# Patient Record
Sex: FEMALE | Race: WHITE | NOT HISPANIC OR LATINO | Employment: OTHER | URBAN - METROPOLITAN AREA
[De-identification: names, ages, dates, MRNs, and addresses within clinical notes are randomized per-mention and may not be internally consistent; named-entity substitution may affect disease eponyms.]

---

## 2017-06-27 ENCOUNTER — MYC MEDICAL ADVICE (OUTPATIENT)
Dept: INTERNAL MEDICINE | Facility: CLINIC | Age: 62
End: 2017-06-27

## 2017-06-27 DIAGNOSIS — E06.3 HYPOTHYROIDISM DUE TO HASHIMOTO'S THYROIDITIS: ICD-10-CM

## 2017-06-27 DIAGNOSIS — E78.5 HYPERLIPIDEMIA LDL GOAL <130: Primary | ICD-10-CM

## 2017-06-27 DIAGNOSIS — Z00.00 ENCOUNTER FOR ROUTINE ADULT HEALTH EXAMINATION WITHOUT ABNORMAL FINDINGS: ICD-10-CM

## 2017-07-03 DIAGNOSIS — E03.9 ACQUIRED HYPOTHYROIDISM: ICD-10-CM

## 2017-07-03 RX ORDER — LEVOTHYROXINE SODIUM 75 UG/1
TABLET ORAL
Qty: 90 TABLET | Refills: 0 | Status: SHIPPED | OUTPATIENT
Start: 2017-07-03 | End: 2017-07-18

## 2017-07-03 NOTE — TELEPHONE ENCOUNTER
Levothyroxine     Last Written Prescription Date: 07/07/16  Last Quantity: 90, # refills: 3  Last Office Visit with G, P or [facility] prescribing provider: 07/14/16   Next 5 appointments (look out 90 days)     Jul 18, 2017  9:20 AM CDT   MyChart Physical Adult with Alisia Jordan MD   [facility] ([facility])    [address]  [facility] [id number] [phone]                   TSH   Date Value Ref Range Status   07/06/2016 1.19 0.40 - 4.00 mU/L Final

## 2017-07-12 DIAGNOSIS — E06.3 HYPOTHYROIDISM DUE TO HASHIMOTO'S THYROIDITIS: ICD-10-CM

## 2017-07-12 DIAGNOSIS — Z00.00 ENCOUNTER FOR ROUTINE ADULT HEALTH EXAMINATION WITHOUT ABNORMAL FINDINGS: ICD-10-CM

## 2017-07-12 DIAGNOSIS — E78.5 HYPERLIPIDEMIA LDL GOAL <130: ICD-10-CM

## 2017-07-12 PROCEDURE — 36415 COLL VENOUS BLD VENIPUNCTURE: CPT | Performed by: FAMILY MEDICINE

## 2017-07-12 PROCEDURE — 80048 BASIC METABOLIC PNL TOTAL CA: CPT | Performed by: FAMILY MEDICINE

## 2017-07-12 PROCEDURE — 80061 LIPID PANEL: CPT | Performed by: FAMILY MEDICINE

## 2017-07-12 PROCEDURE — 84443 ASSAY THYROID STIM HORMONE: CPT | Performed by: FAMILY MEDICINE

## 2017-07-13 LAB
ANION GAP SERPL CALCULATED.3IONS-SCNC: 7 MMOL/L (ref 3–14)
BUN SERPL-MCNC: 23 MG/DL (ref 7–30)
CALCIUM SERPL-MCNC: 9.3 MG/DL (ref 8.5–10.1)
CHLORIDE SERPL-SCNC: 107 MMOL/L (ref 94–109)
CHOLEST SERPL-MCNC: 263 MG/DL
CO2 SERPL-SCNC: 26 MMOL/L (ref 20–32)
CREAT SERPL-MCNC: 0.96 MG/DL (ref 0.52–1.04)
GFR SERPL CREATININE-BSD FRML MDRD: 59 ML/MIN/1.7M2
GLUCOSE SERPL-MCNC: 93 MG/DL (ref 70–99)
HDLC SERPL-MCNC: 65 MG/DL
LDLC SERPL CALC-MCNC: 173 MG/DL
NONHDLC SERPL-MCNC: 198 MG/DL
POTASSIUM SERPL-SCNC: 4.8 MMOL/L (ref 3.4–5.3)
SODIUM SERPL-SCNC: 140 MMOL/L (ref 133–144)
TRIGL SERPL-MCNC: 124 MG/DL
TSH SERPL DL<=0.005 MIU/L-ACNC: 1.67 MU/L (ref 0.4–4)

## 2017-07-18 ENCOUNTER — OFFICE VISIT (OUTPATIENT)
Dept: INTERNAL MEDICINE | Facility: CLINIC | Age: 62
End: 2017-07-18
Payer: COMMERCIAL

## 2017-07-18 VITALS
HEIGHT: 66 IN | SYSTOLIC BLOOD PRESSURE: 98 MMHG | DIASTOLIC BLOOD PRESSURE: 68 MMHG | OXYGEN SATURATION: 99 % | WEIGHT: 135.8 LBS | BODY MASS INDEX: 21.83 KG/M2 | TEMPERATURE: 98 F | HEART RATE: 96 BPM | RESPIRATION RATE: 16 BRPM

## 2017-07-18 DIAGNOSIS — L98.9 SKIN LESION: ICD-10-CM

## 2017-07-18 DIAGNOSIS — E78.5 HYPERLIPIDEMIA LDL GOAL <130: ICD-10-CM

## 2017-07-18 DIAGNOSIS — E03.9 ACQUIRED HYPOTHYROIDISM: ICD-10-CM

## 2017-07-18 DIAGNOSIS — Z00.00 ROUTINE GENERAL MEDICAL EXAMINATION AT A HEALTH CARE FACILITY: Primary | ICD-10-CM

## 2017-07-18 DIAGNOSIS — F32.5 MAJOR DEPRESSIVE DISORDER WITH SINGLE EPISODE, IN FULL REMISSION (H): ICD-10-CM

## 2017-07-18 PROCEDURE — 99396 PREV VISIT EST AGE 40-64: CPT | Performed by: INTERNAL MEDICINE

## 2017-07-18 RX ORDER — LEVOTHYROXINE SODIUM 75 UG/1
75 TABLET ORAL DAILY
Qty: 90 TABLET | Refills: 3 | Status: SHIPPED | OUTPATIENT
Start: 2017-07-18 | End: 2018-08-23

## 2017-07-18 RX ORDER — SERTRALINE HYDROCHLORIDE 100 MG/1
100 TABLET, FILM COATED ORAL DAILY
Qty: 90 TABLET | Refills: 3 | Status: SHIPPED | OUTPATIENT
Start: 2017-07-18 | End: 2018-07-15

## 2017-07-18 NOTE — PATIENT INSTRUCTIONS
PREVENTIVE HEALTH RECOMMENDATIONS:     Vaccines: Get a flu shot each year. Get a tetanus shot every 10 years.     Exercise for at least 150 minutes a week (an average of 30 minutes a day, 5 days of the week). This will help you control your weight and prevent disease.    Limit alcohol to one drink per day.    No smoking.     Wear sunscreen to prevent skin cancer.     See your dentist twice a year for an exam and cleaning.    Try to get Calcium 1200 mg total per day. It is best to not take it all at once. Try to get Vitamin D at least 1000 units per day.    BMI or Body Mass Index is a way of indicating weight and health risk for cardiovascular diseases, high blood pressure, diabetes.   Definitions:    Underweight is less than 18.5 and will be associated with health risk.   Normal BMI is 18.5 to 25   Overweight is 25-29   Obesity is 30 or greater   Morbid Obesity is 40 or greater or 35 or greater with diabetes or high blood pressure  Obesity and Morbid Obesity are associated with higher health risks. Lowering calories, exercising more may lower your BMI and even small decreases can have positive impact on lowering health risks.   Your Body mass index is 21.82 kg/(m^2)..

## 2017-07-18 NOTE — NURSING NOTE
"Chief Complaint   Patient presents with     Physical     Blood work completed       Initial BP 98/68  Pulse 96  Temp 98  F (36.7  C) (Oral)  Resp 16  Ht 5' 6.15\" (1.68 m)  Wt 135 lb 12.8 oz (61.6 kg)  SpO2 99%  BMI 21.82 kg/m2 Estimated body mass index is 21.82 kg/(m^2) as calculated from the following:    Height as of this encounter: 5' 6.15\" (1.68 m).    Weight as of this encounter: 135 lb 12.8 oz (61.6 kg).  Medication Reconciliation: complete      Chirag Vicente CMA      Pt received HCD and will return when complete.      "

## 2017-07-18 NOTE — PROGRESS NOTES
SUBJECTIVE:   CC: Yessi Claire is an 61 year old woman who presents for preventive health visit.     Healthy Habits:  Answers for HPI/ROS submitted by the patient on 7/15/2017   Annual Exam:  Getting at least 3 servings of Calcium per day:: Yes  Bi-annual eye exam:: Yes  Dental care twice a year:: Yes  Sleep apnea or symptoms of sleep apnea:: None  Diet:: Regular (no restrictions)  Frequency of exercise:: 4-5 days/week  Taking medications regularly:: Yes  Medication side effects:: None  Additional concerns today:: No  PHQ-2 Score: 0  Duration of exercise:: 45-60 minutes    Current concerns:   Depression well controlled.   She has some questions about lipids, taking fish oil about 2 years be variable use. She may take 1-2 months then not at all a few months.       Today's PHQ-2 Score:   PHQ-2 ( 1999 Pfizer) 7/15/2017 7/14/2016   Q1: Little interest or pleasure in doing things 0 0   Q2: Feeling down, depressed or hopeless 0 0   PHQ-2 Score 0 0   Q1: Little interest or pleasure in doing things Not at all -   Q2: Feeling down, depressed or hopeless Not at all -   PHQ-2 Score 0 -       Abuse: Current or Past(Physical, Sexual or Emotional)- No  Do you feel safe in your environment - Yes    Social History   Substance Use Topics     Smoking status: Never Smoker     Smokeless tobacco: Never Used     Alcohol use No     The patient does not drink >3 drinks per day nor >7 drinks per week.    Reviewed orders with patient.  Reviewed health maintenance and updated orders accordingly - Yes      Patient over age 50, mutual decision to screen reflected in health maintenance.      Pertinent mammograms are reviewed under the imaging tab.  History of abnormal Pap smear: NO - age 30-65 PAP every 5 years with negative HPV co-testing recommended      Patient Active Problem List   Diagnosis     Hypothyroidism     Advanced directives, counseling/discussion     Major depression     Low back pain     Hashimoto's thyroiditis     Lyme  "meningitis     Hyperlipidemia LDL goal <130     Current Outpatient Prescriptions   Medication Sig Dispense Refill     sertraline (ZOLOFT) 100 MG tablet Take 1 tablet (100 mg) by mouth daily 90 tablet 3     levothyroxine (SYNTHROID/LEVOTHROID) 75 MCG tablet Take 1 tablet (75 mcg) by mouth daily 90 tablet 3     Omega-3 Fatty Acids (OMEGA-3 FISH OIL PO) Take by mouth as needed        calcium carbonate (OS-MARY 500 MG [place]. CA) 500 MG tablet Take 500 mg by mouth as needed  180 tablet 3     Naproxen Sodium (ALEVE PO) Take 220 mg by mouth as needed for moderate pain       Multiple Vitamin (MULTI-VITAMIN PO) Take by mouth as needed        ranitidine (ZANTAC 75) 75 MG tablet Take 75 mg by mouth daily. 1 daily       ASPIRIN 81 MG OR TABS 1 tablet 3 times a week        Review Of Systems  Skin: wants derm check  Eyes: negative  Ears/Nose/Throat: negative  Respiratory: No dyspnea on exertion and No cough  Cardiovascular: negative  Gastrointestinal: negative  Genitourinary: negative  Musculoskeletal: negative  Neurologic: negative  Psychiatric: negative  Hematologic/Lymphatic/Immunologic: negative  Endocrine: negative   Gynecologic ros reveals:no breast pain or new or enlarging lumps on self exam, no vaginal bleeding, no discharge or pelvic pain    Objective:  Patient alert, in no acute distress  BP 98/68  Pulse 96  Temp 98  F (36.7  C) (Oral)  Resp 16  Ht 5' 6.15\" (1.68 m)  Wt 135 lb 12.8 oz (61.6 kg)  SpO2 99%  BMI 21.82 kg/m2    HEENT: extraocular movements are intact, pupils equal and reactive to light and accommodation, TMs clear, oropharynx clear  NECK: Neck supple. No adenopathy. Thyroid symmetric, normal size,, Carotids without bruits.  PULMONARY: clear to auscultation  CARDIAC: regular rate and rhythm and no murmurs, clicks, or gallops  PULSES: 2/2 throughout  BACK: no spinal or CVAT  ABDOMINAL: Soft, nontender.  Normal bowel sounds.  No hepatosplenomegaly or abnormal masses  BREAST: No breast masses or " "tenderness, No axillary masses or tenderness and No galactorrhea  PELVIC: external genitalia normal, , bimanual exam with normal uterus and adnexa,  REFLEXES: 2+ throughout  SKIN: benign nevi    Lab Results   Component Value Date    HDL 65 07/12/2017     07/12/2017    CHOL 263 07/12/2017    TRIG 124 07/12/2017           Lab Results   Component Value Date    TSH 1.67 07/12/2017    TSH 1.19 07/06/2016    TSH 1.62 06/30/2015        ASSESSMENT/PLAN:   1. Routine general medical examination at a health care facility      2. Major depressive disorder with single episode, in full remission (H)  Continue med  - sertraline (ZOLOFT) 100 MG tablet; Take 1 tablet (100 mg) by mouth daily  Dispense: 90 tablet; Refill: 3    3. Hyperlipidemia LDL goal <130  Discussed high LDL, recommend low fat diet, probably does not need fish oil, recheck in 3 months.  - Lipid panel reflex to direct LDL; Future    4. Acquired hypothyroidism  Stable, continue dose  - levothyroxine (SYNTHROID/LEVOTHROID) 75 MCG tablet; Take 1 tablet (75 mcg) by mouth daily  Dispense: 90 tablet; Refill: 3    5. Skin lesion  No concerning lesions noted but refer for skin check  - DERMATOLOGY REFERRAL    COUNSELING:   Reviewed preventive health counseling, as reflected in patient instructions       Osteoporosis Prevention/Bone Health         reports that she has never smoked. She has never used smokeless tobacco.    Estimated body mass index is 20.83 kg/(m^2) as calculated from the following:    Height as of 7/14/16: 5' 6.5\" (1.689 m).    Weight as of 7/14/16: 131 lb (59.4 kg).         Counseling Resources:  ATP IV Guidelines  Pooled Cohorts Equation Calculator  Breast Cancer Risk Calculator  FRAX Risk Assessment  ICSI Preventive Guidelines  Dietary Guidelines for Americans, 2010  USDA's MyPlate  ASA Prophylaxis  Lung CA Screening    Alisia Jordan MD  [facility]  "

## 2017-07-18 NOTE — MR AVS SNAPSHOT
After Visit Summary   7/18/2017    Yessi Claire    MRN: [id number]           Patient Information     Date Of Birth          1955        Visit Information        Provider Department      7/18/2017 9:20 AM Alisia Jordan MD [facility]        Today's Diagnoses     Routine general medical examination at a health care facility    -  1    Major depressive disorder with single episode, in full remission (H)        Hyperlipidemia LDL goal <130        Acquired hypothyroidism        Skin lesion          Care Instructions      PREVENTIVE HEALTH RECOMMENDATIONS:     Vaccines: Get a flu shot each year. Get a tetanus shot every 10 years.     Exercise for at least 150 minutes a week (an average of 30 minutes a day, 5 days of the week). This will help you control your weight and prevent disease.    Limit alcohol to one drink per day.    No smoking.     Wear sunscreen to prevent skin cancer.     See your dentist twice a year for an exam and cleaning.    Try to get Calcium 1200 mg total per day. It is best to not take it all at once. Try to get Vitamin D at least 1000 units per day.    BMI or Body Mass Index is a way of indicating weight and health risk for cardiovascular diseases, high blood pressure, diabetes.   Definitions:    Underweight is less than 18.5 and will be associated with health risk.   Normal BMI is 18.5 to 25   Overweight is 25-29   Obesity is 30 or greater   Morbid Obesity is 40 or greater or 35 or greater with diabetes or high blood pressure  Obesity and Morbid Obesity are associated with higher health risks. Lowering calories, exercising more may lower your BMI and even small decreases can have positive impact on lowering health risks.   Your Body mass index is 21.82 kg/(m^2)..             Follow-ups after your visit        Additional Services     DERMATOLOGY REFERRAL       Your provider has referred you to: FMG: [facility] [phone]    [email or web address]  [facility]: [facility] [place] [phone]   [email or web address]  [facility]: [facility] [place] [phone]   [email or web address]  [facility] [place] [phone]  [email or web address]    Please be aware that coverage of these services is subject to the terms and limitations of your health insurance plan.  Call member services at your health plan with any benefit or coverage questions.      Please bring the following with you to your appointment:    (1) Any X-Rays, CTs or MRIs which have been performed.  Contact the facility where they were done to arrange for  prior to your scheduled appointment.    (2) List of current medications  (3) This referral request   (4) Any documents/labs given to you for this referral                  Future tests that were ordered for you today     Open Future Orders        Priority Expected Expires Ordered    Lipid panel reflex to direct LDL Routine 10/18/2017 11/18/2017 7/18/2017            Who to contact     If you have questions or need follow up information about today's clinic visit or your schedule please contact [facility] directly at [phone].  Normal or non-critical lab and imaging results will be communicated to you by MyChart, letter or phone within 4 business days after the clinic has received the results. If you do not hear from us within 7 days, please contact the clinic through [employer] Solicitors[employer] or phone. If you have a critical or abnormal lab result, we will notify you by phone as soon as possible.  Submit refill requests through [employer] or call your pharmacy and they will forward the refill request to us. Please allow 3 business days for your refill to be completed.          Additional Information About Your Visit        Architectural Daily Information     [employer] gives you secure access  "to your electronic health record. If you see a primary care provider, you can also send messages to your care team and make appointments. If you have questions, please call your primary care clinic.  If you do not have a primary care provider, please call [phone] and they will assist you.        Care EveryWhere ID     This is your Care EveryWhere ID. This could be used by other organizations to access your [facility] medical records  [id number]        Your Vitals Were     Pulse Temperature Respirations Height Pulse Oximetry BMI (Body Mass Index)    96 98  F (36.7  C) (Oral) 16 5' 6.15\" (1.68 m) 99% 21.82 kg/m2       Blood Pressure from Last 3 Encounters:   07/18/17 98/68   07/14/16 110/80   07/09/15 110/68    Weight from Last 3 Encounters:   07/18/17 135 lb 12.8 oz (61.6 kg)   07/14/16 131 lb (59.4 kg)   07/09/15 128 lb 3.2 oz (58.2 kg)              We Performed the Following     DERMATOLOGY REFERRAL          Today's Medication Changes          These changes are accurate as of: 7/18/17 10:16 AM.  If you have any questions, ask your nurse or doctor.               These medicines have changed or have updated prescriptions.        Dose/Directions    levothyroxine 75 MCG tablet   Commonly known as:  SYNTHROID/LEVOTHROID   This may have changed:  See the new instructions.   Used for:  Acquired hypothyroidism   Changed by:  Alisia Jordan MD        Dose:  75 mcg   Take 1 tablet (75 mcg) by mouth daily   Quantity:  90 tablet   Refills:  3         Stop taking these medicines if you haven't already. Please contact your care team if you have questions.     VITAMIN D3 PO   Stopped by:  Alisia Jordan MD                Where to get your medicines      These medications were sent to [facility]  [facility] - [address]  [address] [place] [address]     Phone:  [phone]     levothyroxine 75 MCG tablet    sertraline 100 MG tablet                Primary Care Provider Office Phone # " Fax #    Alisia Jordan [phone]       [facility] [address]  [facility] [address]        Equal Access to Services     SACHIN GUZMAN : Hadii aad ku hadniyahaliya Batista, waaxda luqadaha, qaybta kaalmada rayada, tanya minnain hayaapeyton hartzeny edmondsheridan richardson. So [facility] [phone].    ATENCIÓN: Si habla español, tiene a porter disposición servicios gratuitos de asistencia lingüística. Iramame al [phone].    We comply with applicable federal civil rights laws and [place] laws. We do not discriminate on the basis of race, color, national origin, age, disability sex, sexual orientation or gender identity.            Thank you!     Thank you for choosing [facility]  for your care. Our goal is always to provide you with excellent care. Hearing back from our patients is one way we can continue to improve our services. Please take a few minutes to complete the written survey that you may receive in the mail after your visit with us. Thank you!             Your Updated Medication List - Protect others around you: Learn how to safely use, store and throw away your medicines at www.disposemymeds.org.          This list is accurate as of: 7/18/17 10:16 AM.  Always use your most recent med list.                   Brand Name Dispense Instructions for use Diagnosis    ALEVE PO      Take 220 mg by mouth as needed for moderate pain        aspirin 81 MG tablet      1 tablet 3 times a week        calcium carbonate 1250 MG tablet    OS-MARY 500 mg [place]. Ca    180 tablet    Take 500 mg by mouth as needed        levothyroxine 75 MCG tablet    SYNTHROID/LEVOTHROID    90 tablet    Take 1 tablet (75 mcg) by mouth daily    Acquired hypothyroidism       MULTI-VITAMIN PO      Take by mouth as needed        OMEGA-3 FISH OIL PO      Take by mouth as needed        ranitidine 75 MG tablet   Generic drug:  ranitidine      Take 75 mg by mouth daily. 1 daily        sertraline 100 MG tablet    ZOLOFT     90 tablet    Take 1 tablet (100 mg) by mouth daily    Major depressive disorder with single episode, in full remission (H)

## 2017-07-24 ASSESSMENT — PATIENT HEALTH QUESTIONNAIRE - PHQ9: SUM OF ALL RESPONSES TO PHQ QUESTIONS 1-9: 0

## 2017-10-26 ENCOUNTER — ALLIED HEALTH/NURSE VISIT (OUTPATIENT)
Dept: NURSING | Facility: CLINIC | Age: 62
End: 2017-10-26
Payer: COMMERCIAL

## 2017-10-26 DIAGNOSIS — Z23 NEED FOR PROPHYLACTIC VACCINATION AND INOCULATION AGAINST INFLUENZA: Primary | ICD-10-CM

## 2017-10-26 PROCEDURE — 90686 IIV4 VACC NO PRSV 0.5 ML IM: CPT

## 2017-10-26 PROCEDURE — G0008 ADMIN INFLUENZA VIRUS VAC: HCPCS

## 2017-10-26 NOTE — MR AVS SNAPSHOT
After Visit Summary   10/26/2017    Yessi Claire    MRN: [id number]           Patient Information     Date Of Birth          1955        Visit Information        Provider Department      10/26/2017 10:30 AM ROSA DUVALL/LPN [facility] [place]        Today's Diagnoses     Need for prophylactic vaccination and inoculation against influenza    -  1       Follow-ups after your visit        Your next 10 appointments already scheduled     Nov 07, 2017 10:30 AM CST   Lab visit with [facility]   [facility] [place] ([facility])    [address] [place] [address] [phone]           Please do not eat 10-12 hours before your appointment if you are coming in fasting for labs on lipids, cholesterol, or glucose (sugar). Does not apply to pregnant women.  Water with medications is okay. Do not drink coffee or other fluids.  If you have concerns about taking your medications, please send a message by clicking on Secure Messaging, Message Your Care Team.              Who to contact     If you have questions or need follow up information about today's clinic visit or your schedule please contact [facility] directly at [phone].  Normal or non-critical lab and imaging results will be communicated to you by Kymetahart, letter or phone within 4 business days after the clinic has received the results. If you do not hear from us within 7 days, please contact the clinic through Teradicit or phone. If you have a critical or abnormal lab result, we will notify you by phone as soon as possible.  Submit refill requests through Meno[employer]iX or call your pharmacy and they will forward the refill request to us. Please allow 3 business days for your refill to be completed.          Additional Information About Your Visit        MyChart Information     Meno[employer]iX gives you secure access to your electronic health record. If you see a primary care provider, you can also send messages to your care  team and make appointments. If you have questions, please call your primary care clinic.  If you do not have a primary care provider, please call [phone] and they will assist you.        Care EveryWhere ID     This is your Care EveryWhere ID. This could be used by other organizations to access your [facility] medical records  [id number]         Blood Pressure from Last 3 Encounters:   07/18/17 98/68   07/14/16 110/80   07/09/15 110/68    Weight from Last 3 Encounters:   07/18/17 135 lb 12.8 oz (61.6 kg)   07/14/16 131 lb (59.4 kg)   07/09/15 128 lb 3.2 oz (58.2 kg)              We Performed the Following     ADMIN INFLUENZA (For MEDICARE Patients ONLY) []     FLU VAC, SPLIT VIRUS IM > 3 YO (QUADRIVALENT) [13858]        Primary Care Provider Office Phone # Fax #    Alisia Jordan [phone]       [address] [place] [address]        Equal Access to Services     ADELINA [facility] : Hadii aad ku hadasho Soomaali, waaxda luqadaha, qaybta kaalmada adeegyada, waxay pedro hayanisa patterson . So [facility] [phone].    ATENCIÓN: Si habla español, tiene a porter disposición servicios gratuitos de asistencia lingüística. Ll[facility] [phone].    We comply with applicable federal civil rights laws and [place] laws. We do not discriminate on the basis of race, color, national origin, age, disability, sex, sexual orientation, or gender identity.            Thank you!     Thank you for choosing [facility]  for your care. Our goal is always to provide you with excellent care. Hearing back from our patients is one way we can continue to improve our services. Please take a few minutes to complete the written survey that you may receive in the mail after your visit with us. Thank you!             Your Updated Medication List - Protect others around you: Learn how to safely use, store and throw away your medicines at www.disposemymeds.org.          This list is accurate as of:  10/26/17 11:19 AM.  Always use your most recent med list.                   Brand Name Dispense Instructions for use Diagnosis    ALEVE PO      Take 220 mg by mouth as needed for moderate pain        aspirin 81 MG tablet      1 tablet 3 times a week        calcium carbonate 1250 MG tablet    OS-MARY 500 mg [place]. Ca    180 tablet    Take 500 mg by mouth as needed        levothyroxine 75 MCG tablet    SYNTHROID/LEVOTHROID    90 tablet    Take 1 tablet (75 mcg) by mouth daily    Acquired hypothyroidism       MULTI-VITAMIN PO      Take by mouth as needed        OMEGA-3 FISH OIL PO      Take by mouth as needed        ranitidine 75 MG tablet   Generic drug:  ranitidine      Take 75 mg by mouth daily. 1 daily        sertraline 100 MG tablet    ZOLOFT    90 tablet    Take 1 tablet (100 mg) by mouth daily    Major depressive disorder with single episode, in full remission (H)

## 2017-10-26 NOTE — PROGRESS NOTES

## 2017-11-07 DIAGNOSIS — E78.5 HYPERLIPIDEMIA LDL GOAL <130: ICD-10-CM

## 2017-11-07 LAB
CHOLEST SERPL-MCNC: 241 MG/DL
HDLC SERPL-MCNC: 71 MG/DL
LDLC SERPL CALC-MCNC: 152 MG/DL
NONHDLC SERPL-MCNC: 170 MG/DL
TRIGL SERPL-MCNC: 90 MG/DL

## 2017-11-07 PROCEDURE — 36415 COLL VENOUS BLD VENIPUNCTURE: CPT | Performed by: FAMILY MEDICINE

## 2017-11-07 PROCEDURE — 80061 LIPID PANEL: CPT | Performed by: FAMILY MEDICINE

## 2018-02-16 ENCOUNTER — OFFICE VISIT (OUTPATIENT)
Dept: FAMILY MEDICINE | Facility: CLINIC | Age: 63
End: 2018-02-16
Payer: COMMERCIAL

## 2018-02-16 VITALS
BODY MASS INDEX: 22.5 KG/M2 | HEIGHT: 66 IN | WEIGHT: 140 LBS | TEMPERATURE: 98.1 F | OXYGEN SATURATION: 99 % | DIASTOLIC BLOOD PRESSURE: 66 MMHG | HEART RATE: 113 BPM | SYSTOLIC BLOOD PRESSURE: 132 MMHG

## 2018-02-16 DIAGNOSIS — B34.9 VIRAL SYNDROME: Primary | ICD-10-CM

## 2018-02-16 DIAGNOSIS — R07.0 THROAT PAIN: ICD-10-CM

## 2018-02-16 LAB
DEPRECATED S PYO AG THROAT QL EIA: NORMAL
SPECIMEN SOURCE: NORMAL

## 2018-02-16 PROCEDURE — 87081 CULTURE SCREEN ONLY: CPT | Performed by: PHYSICIAN ASSISTANT

## 2018-02-16 PROCEDURE — 99213 OFFICE O/P EST LOW 20 MIN: CPT | Performed by: PHYSICIAN ASSISTANT

## 2018-02-16 PROCEDURE — 87880 STREP A ASSAY W/OPTIC: CPT | Performed by: PHYSICIAN ASSISTANT

## 2018-02-16 ASSESSMENT — ANXIETY QUESTIONNAIRES
2. NOT BEING ABLE TO STOP OR CONTROL WORRYING: NOT AT ALL
1. FEELING NERVOUS, ANXIOUS, OR ON EDGE: NOT AT ALL
3. WORRYING TOO MUCH ABOUT DIFFERENT THINGS: NOT AT ALL
7. FEELING AFRAID AS IF SOMETHING AWFUL MIGHT HAPPEN: NOT AT ALL
5. BEING SO RESTLESS THAT IT IS HARD TO SIT STILL: NOT AT ALL
GAD7 TOTAL SCORE: 0
IF YOU CHECKED OFF ANY PROBLEMS ON THIS QUESTIONNAIRE, HOW DIFFICULT HAVE THESE PROBLEMS MADE IT FOR YOU TO DO YOUR WORK, TAKE CARE OF THINGS AT HOME, OR GET ALONG WITH OTHER PEOPLE: NOT DIFFICULT AT ALL
6. BECOMING EASILY ANNOYED OR IRRITABLE: NOT AT ALL

## 2018-02-16 ASSESSMENT — PATIENT HEALTH QUESTIONNAIRE - PHQ9: 5. POOR APPETITE OR OVEREATING: NOT AT ALL

## 2018-02-16 NOTE — PROGRESS NOTES
SUBJECTIVE:   Yessi Claire is a 62 year old female who presents to clinic today for the following health issues:      Acute Illness   Acute illness concerns: Possible Strep  Onset: x 2 days    Fever: YES- last night 100.4, this morning 99.9    Chills/Sweats: no     Headache (location?): no     Sinus Pressure:no    Conjunctivitis:  no    Ear Pain: R ear plugged with a little pain    Rhinorrhea: YES    Congestion: YES    Sore Throat: YES     Cough: YES    Wheeze: no     Decreased Appetite: no     Nausea: no     Vomiting: no     Diarrhea:  no     Dysuria/Freq.: no     Fatigue/Achiness: YES- body hurt and felt fatigued.    Sick/Strep Exposure: YES- Exposed to granddaughters who have strep     Therapies Tried and outcome: Aleve    Patient received flu shot    Problem list and histories reviewed & adjusted, as indicated.  Additional history: as documented    Patient Active Problem List   Diagnosis     Hypothyroidism     Advanced directives, counseling/discussion     Major depression     Low back pain     Hashimoto's thyroiditis     Hyperlipidemia LDL goal <130     Past Surgical History:   Procedure Laterality Date     NO HISTORY OF SURGERY         Social History   Substance Use Topics     Smoking status: Never Smoker     Smokeless tobacco: Never Used     Alcohol use No     Family History   Problem Relation Age of Onset     Thyroid Disease Mother      C.A.D. Father 80     Thyroid Disease Sister      Thyroid Disease Sister      DIABETES Sister      Type 1         Current Outpatient Prescriptions   Medication Sig Dispense Refill     UNABLE TO FIND Colestaf one a day       sertraline (ZOLOFT) 100 MG tablet Take 1 tablet (100 mg) by mouth daily 90 tablet 3     levothyroxine (SYNTHROID/LEVOTHROID) 75 MCG tablet Take 1 tablet (75 mcg) by mouth daily 90 tablet 3     Omega-3 Fatty Acids (OMEGA-3 FISH OIL PO) Take by mouth as needed        calcium carbonate (OS-MARY 500 MG [place]. [place]) 500 MG tablet Take 500 mg by mouth as  "needed  180 tablet 3     Naproxen Sodium (ALEVE PO) Take 220 mg by mouth as needed for moderate pain       Multiple Vitamin (MULTI-VITAMIN PO) Take by mouth as needed        ranitidine (ZANTAC 75) 75 MG tablet Take 75 mg by mouth daily. 1 daily       ASPIRIN 81 MG OR TABS 1 tablet 3 times a week       No Known Allergies    Reviewed and updated as needed this visit by clinical staff       Reviewed and updated as needed this visit by Provider         ROS:  Constitutional, HEENT, cardiovascular, pulmonary, gi and gu systems are negative, except as otherwise noted.    OBJECTIVE:     /66 (BP Location: Right arm, Patient Position: Sitting, Cuff Size: Adult Regular)  Pulse 113  Temp 98.1  F (36.7  C) (Oral)  Ht 5' 6.15\" (1.68 m)  Wt 140 lb (63.5 kg)  SpO2 99%  BMI 22.49 kg/m2  Body mass index is 22.49 kg/(m^2).  GENERAL: healthy, alert and no distress  EYES: Eyes grossly normal to inspection, PERRL and conjunctivae and sclerae normal  HENT: ear canals and TM's normal, nose and mouth without ulcers or lesions  NECK: no adenopathy, no asymmetry, masses, or scars and thyroid normal to palpation  RESP: lungs clear to auscultation - no rales, rhonchi or wheezes  CV: regular rate and rhythm, normal S1 S2, no S3 or S4, no murmur, click or rub, no peripheral edema and peripheral pulses strong  SKIN: no suspicious lesions or rashes    Diagnostic Test Results:  Results for orders placed or performed in visit on 02/16/18 (from the past 24 hour(s))   Strep, Rapid Screen   Result Value Ref Range    Specimen Description Throat     Rapid Strep A Screen       NEGATIVE: No Group A streptococcal antigen detected by immunoassay, await culture report.       ASSESSMENT/PLAN:     1. Viral syndrome  Symptoms consistent with viral process. Discussed possibility of mild influenza, as patient received flu shot. She declines flu testing today. Recommend home cares and OTC medications to help with symptoms. Follow-up if symptoms worsen " over time, or if no improvement over the next 7-10 days.    2. Throat pain  RST negative. Culture pending  - Strep, Rapid Screen  - Beta strep group A culture    Malia Sr PA-C  [facility]

## 2018-02-16 NOTE — NURSING NOTE
"Chief Complaint   Patient presents with     Pharyngitis       Initial /66 (BP Location: Right arm, Patient Position: Sitting, Cuff Size: Adult Regular)  Pulse 113  Temp 98.1  F (36.7  C) (Oral)  Ht 5' 6.15\" (1.68 m)  Wt 140 lb (63.5 kg)  SpO2 99%  BMI 22.49 kg/m2 Estimated body mass index is 22.49 kg/(m^2) as calculated from the following:    Height as of this encounter: 5' 6.15\" (1.68 m).    Weight as of this encounter: 140 lb (63.5 kg).  Medication Reconciliation: complete   Luna Lowry MA    "

## 2018-02-16 NOTE — PATIENT INSTRUCTIONS

## 2018-02-17 ASSESSMENT — PATIENT HEALTH QUESTIONNAIRE - PHQ9: SUM OF ALL RESPONSES TO PHQ QUESTIONS 1-9: 0

## 2018-02-17 ASSESSMENT — ANXIETY QUESTIONNAIRES: GAD7 TOTAL SCORE: 0

## 2018-02-19 LAB
BACTERIA SPEC CULT: NORMAL
SPECIMEN SOURCE: NORMAL

## 2018-06-29 ENCOUNTER — MYC MEDICAL ADVICE (OUTPATIENT)
Dept: INTERNAL MEDICINE | Facility: CLINIC | Age: 63
End: 2018-06-29

## 2018-07-09 ENCOUNTER — OFFICE VISIT (OUTPATIENT)
Dept: INTERNAL MEDICINE | Facility: CLINIC | Age: 63
End: 2018-07-09
Payer: COMMERCIAL

## 2018-07-09 VITALS
SYSTOLIC BLOOD PRESSURE: 122 MMHG | WEIGHT: 138.4 LBS | HEART RATE: 100 BPM | BODY MASS INDEX: 22.24 KG/M2 | DIASTOLIC BLOOD PRESSURE: 70 MMHG | HEIGHT: 66 IN | TEMPERATURE: 98.5 F | OXYGEN SATURATION: 98 %

## 2018-07-09 DIAGNOSIS — F32.5 MAJOR DEPRESSIVE DISORDER WITH SINGLE EPISODE, IN FULL REMISSION (H): ICD-10-CM

## 2018-07-09 DIAGNOSIS — F41.9 ANXIETY: Primary | ICD-10-CM

## 2018-07-09 PROCEDURE — 99214 OFFICE O/P EST MOD 30 MIN: CPT | Performed by: INTERNAL MEDICINE

## 2018-07-09 NOTE — PROGRESS NOTES
"  SUBJECTIVE:   Yessi Claire is a 62 year old female who presents to clinic today for the following health issues:    She is here today about some increased stress and anxiety symptoms.  She started to have sleep problems a few weeks ago, trouble falling asleep and awakening.  She would feel very anxious when she is awake and it was hard to get back to sleep.  During the day she was more on edge.  She also started to have a little loose stools in the morning.  She would still feel very anxious to the morning but would feel little better by afternoon.  She had contacted me last week and did increase her sertraline to 125 mg, not to 150 is suggested.  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  Since she has increased her medication and thought about why this may be happening, she is actually starting to feel little bit better and wonders if that is more to do with understanding the trigger rather than the medication change.  She has not had any depression symptoms associated with this.    Hyperlipidemia Follow-Up      Rate your low fat/cholesterol diet?: good    Taking statin?  No    Other lipid medications/supplements?:  Fish oil/Omega 3, dose no without side effects    Depression and Anxiety Follow-Up    Status since last visit: Depression per patient is fine, Anxiety has increased over the last few weeks    Other associated symptoms: started with sleep disturbances, looser stools, \"nervous feeling\" in stomach    Complicating factors:     Significant life event: No     Current substance abuse: None    PHQ-9 7/14/2016 7/23/2017 2/16/2018   Total Score 0 0 0   Q9: Suicide Ideation Not at all Not at all Not at all     OSCAR-7 SCORE 2/16/2018   Total Score " "0       PHQ-9  English  PHQ-9   Any Language  OSCAR-7  Suicide Assessment Five-step Evaluation and Treatment (SAFE-T)    Hypothyroidism Follow-up      Since last visit, patient describes the following symptoms: loose stools and anxiety      Amount of exercise or physical activity: 6-7 days/week for an average of 45-60 minutes    Problems taking medications regularly: No    Medication side effects: none    Diet: regular (no restrictions)             ROS:  negative    OBJECTIVE:     /70 (BP Location: Right arm, Patient Position: Sitting, Cuff Size: Adult Regular)  Pulse 100  Temp 98.5  F (36.9  C) (Oral)  Ht 5' 6\" (1.676 m)  Wt 138 lb 6.4 oz (62.8 kg)  SpO2 98%  BMI 22.34 kg/m2  Body mass index is 22.34 kg/(m^2).    Not examined     OSCAR-7 SCORE 2/16/2018 7/15/2018   Total Score 0 3       PHQ-9 SCORE 7/23/2017 2/16/2018 7/15/2018   Total Score - - -   Total Score 0 0 1           ASSESSMENT/PLAN:       1. Anxiety  Advised that her anxiety score is not significantly elevated, discussed some strategies for coping with the stress/anxiety related to the grandchildren.  Discussed strategies for help with sleep.  Advised that she could continue the higher dose of Zoloft for the time being since she is improved and could go back down later.  She will consider these options.  At this time I would not suggest referral for any counseling but advised that that would be available.  An updated medication list but did not send a prescription is not clear whether she will stay on the higher dose or not.  She can call for refill when needed    2. Major depressive disorder with single episode, in full remission (H)  Well controlled          Alisia Jordan MD  [facility]    25 minutes spent with the patient, >50% of time spent counseling about anxiety, questionnaire scores, coping skills    "

## 2018-07-15 PROBLEM — F41.9 ANXIETY: Status: ACTIVE | Noted: 2018-07-15

## 2018-07-15 RX ORDER — SERTRALINE HYDROCHLORIDE 100 MG/1
TABLET, FILM COATED ORAL
Qty: 115 TABLET | Refills: 3
Start: 2018-07-15 | End: 2018-08-23

## 2018-07-15 ASSESSMENT — ANXIETY QUESTIONNAIRES
7. FEELING AFRAID AS IF SOMETHING AWFUL MIGHT HAPPEN: NOT AT ALL
1. FEELING NERVOUS, ANXIOUS, OR ON EDGE: MORE THAN HALF THE DAYS
2. NOT BEING ABLE TO STOP OR CONTROL WORRYING: NOT AT ALL
5. BEING SO RESTLESS THAT IT IS HARD TO SIT STILL: NOT AT ALL
6. BECOMING EASILY ANNOYED OR IRRITABLE: SEVERAL DAYS
GAD7 TOTAL SCORE: 3
3. WORRYING TOO MUCH ABOUT DIFFERENT THINGS: NOT AT ALL

## 2018-07-15 ASSESSMENT — PATIENT HEALTH QUESTIONNAIRE - PHQ9: 5. POOR APPETITE OR OVEREATING: NOT AT ALL

## 2018-07-16 ASSESSMENT — PATIENT HEALTH QUESTIONNAIRE - PHQ9: SUM OF ALL RESPONSES TO PHQ QUESTIONS 1-9: 1

## 2018-07-16 ASSESSMENT — ANXIETY QUESTIONNAIRES: GAD7 TOTAL SCORE: 3

## 2018-07-23 ENCOUNTER — MYC MEDICAL ADVICE (OUTPATIENT)
Dept: INTERNAL MEDICINE | Facility: CLINIC | Age: 63
End: 2018-07-23

## 2018-07-23 DIAGNOSIS — E78.5 HYPERLIPIDEMIA LDL GOAL <130: ICD-10-CM

## 2018-07-23 DIAGNOSIS — Z13.1 SCREENING FOR DIABETES MELLITUS: ICD-10-CM

## 2018-07-23 DIAGNOSIS — E06.3 HYPOTHYROIDISM DUE TO HASHIMOTO'S THYROIDITIS: Primary | ICD-10-CM

## 2018-08-16 DIAGNOSIS — E78.5 HYPERLIPIDEMIA LDL GOAL <130: ICD-10-CM

## 2018-08-16 DIAGNOSIS — Z13.1 SCREENING FOR DIABETES MELLITUS: ICD-10-CM

## 2018-08-16 DIAGNOSIS — E06.3 HYPOTHYROIDISM DUE TO HASHIMOTO'S THYROIDITIS: ICD-10-CM

## 2018-08-16 LAB
ANION GAP SERPL CALCULATED.3IONS-SCNC: 6 MMOL/L (ref 3–14)
BUN SERPL-MCNC: 18 MG/DL (ref 7–30)
CALCIUM SERPL-MCNC: 9.1 MG/DL (ref 8.5–10.1)
CHLORIDE SERPL-SCNC: 105 MMOL/L (ref 94–109)
CHOLEST SERPL-MCNC: 261 MG/DL
CO2 SERPL-SCNC: 28 MMOL/L (ref 20–32)
CREAT SERPL-MCNC: 0.92 MG/DL (ref 0.52–1.04)
GFR SERPL CREATININE-BSD FRML MDRD: 61 ML/MIN/1.7M2
GLUCOSE SERPL-MCNC: 99 MG/DL (ref 70–99)
HDLC SERPL-MCNC: 65 MG/DL
LDLC SERPL CALC-MCNC: 171 MG/DL
NONHDLC SERPL-MCNC: 196 MG/DL
POTASSIUM SERPL-SCNC: 3.8 MMOL/L (ref 3.4–5.3)
SODIUM SERPL-SCNC: 139 MMOL/L (ref 133–144)
TRIGL SERPL-MCNC: 124 MG/DL
TSH SERPL DL<=0.005 MIU/L-ACNC: 1.89 MU/L (ref 0.4–4)

## 2018-08-16 PROCEDURE — 36415 COLL VENOUS BLD VENIPUNCTURE: CPT | Performed by: INTERNAL MEDICINE

## 2018-08-16 PROCEDURE — 80048 BASIC METABOLIC PNL TOTAL CA: CPT | Performed by: INTERNAL MEDICINE

## 2018-08-16 PROCEDURE — 80061 LIPID PANEL: CPT | Performed by: INTERNAL MEDICINE

## 2018-08-16 PROCEDURE — 84443 ASSAY THYROID STIM HORMONE: CPT | Performed by: INTERNAL MEDICINE

## 2018-08-21 ASSESSMENT — ACTIVITIES OF DAILY LIVING (ADL)
CURRENT_FUNCTION: NO ASSISTANCE NEEDED
I_NEED_ASSISTANCE_FOR_THE_FOLLOWING_DAILY_ACTIVITIES:: NO ASSISTANCE IS NEEDED

## 2018-08-23 ENCOUNTER — OFFICE VISIT (OUTPATIENT)
Dept: INTERNAL MEDICINE | Facility: CLINIC | Age: 63
End: 2018-08-23
Payer: COMMERCIAL

## 2018-08-23 VITALS
WEIGHT: 134 LBS | SYSTOLIC BLOOD PRESSURE: 120 MMHG | HEART RATE: 84 BPM | HEIGHT: 66 IN | BODY MASS INDEX: 21.53 KG/M2 | OXYGEN SATURATION: 98 % | TEMPERATURE: 98.1 F | RESPIRATION RATE: 14 BRPM | DIASTOLIC BLOOD PRESSURE: 72 MMHG

## 2018-08-23 DIAGNOSIS — E78.5 HYPERLIPIDEMIA LDL GOAL <130: ICD-10-CM

## 2018-08-23 DIAGNOSIS — Z23 NEED FOR VACCINATION: ICD-10-CM

## 2018-08-23 DIAGNOSIS — E03.9 ACQUIRED HYPOTHYROIDISM: ICD-10-CM

## 2018-08-23 DIAGNOSIS — Z12.31 ENCOUNTER FOR SCREENING MAMMOGRAM FOR BREAST CANCER: ICD-10-CM

## 2018-08-23 DIAGNOSIS — Z00.00 WELCOME TO MEDICARE PREVENTIVE VISIT: Primary | ICD-10-CM

## 2018-08-23 DIAGNOSIS — F41.9 ANXIETY: ICD-10-CM

## 2018-08-23 DIAGNOSIS — F32.5 MAJOR DEPRESSIVE DISORDER WITH SINGLE EPISODE, IN FULL REMISSION (H): ICD-10-CM

## 2018-08-23 PROCEDURE — G0402 INITIAL PREVENTIVE EXAM: HCPCS | Mod: 25 | Performed by: INTERNAL MEDICINE

## 2018-08-23 PROCEDURE — 90670 PCV13 VACCINE IM: CPT | Performed by: INTERNAL MEDICINE

## 2018-08-23 PROCEDURE — G0009 ADMIN PNEUMOCOCCAL VACCINE: HCPCS | Performed by: INTERNAL MEDICINE

## 2018-08-23 PROCEDURE — G0405 EKG INTERPRET & REPORT PREVE: HCPCS | Performed by: INTERNAL MEDICINE

## 2018-08-23 RX ORDER — SERTRALINE HYDROCHLORIDE 100 MG/1
TABLET, FILM COATED ORAL
Qty: 115 TABLET | Refills: 3 | Status: SHIPPED | OUTPATIENT
Start: 2018-08-23 | End: 2019-03-04

## 2018-08-23 RX ORDER — LEVOTHYROXINE SODIUM 75 UG/1
75 TABLET ORAL DAILY
Qty: 90 TABLET | Refills: 3 | Status: SHIPPED | OUTPATIENT
Start: 2018-08-23 | End: 2018-09-05

## 2018-08-23 NOTE — PROGRESS NOTES
SUBJECTIVE:   Yessi Claire is a 63 year old female who presents for Preventive Visit.    Are you in the first 12 months of your Medicare Part B coverage?  Yes,  Visual Acuity:  Right Eye: 20/40   Left Eye: 20/40  Both Eyes: 20/30    Healthy Habits:  Answers for HPI/ROS submitted by the patient on 8/21/2018   Annual Exam:  Getting at least 3 servings of Calcium per day:: Yes  Bi-annual eye exam:: Yes  Dental care twice a year:: Yes  Sleep apnea or symptoms of sleep apnea:: None  Diet:: Regular (no restrictions)  Frequency of exercise:: 4-5 days/week  Taking medications regularly:: Yes  Medication side effects:: None  Additional concerns today:: No  Activities of Daily Living: no assistance needed  Home safety: lack of grab bars in the bathroom  Hearing Impairment:: no hearing concerns  PHQ-2 Score: 0  Duration of exercise:: 45-60 minutes    Current concerns:   Questions about lipids, treatment     COGNITIVE SCREEN  1) Repeat 3 items (Leader, Season, Table)    2) Clock draw: NORMAL  3) 3 item recall: Recalls 3 objects  Results: 3 items recalled: COGNITIVE IMPAIRMENT LESS LIKELY    Mini-CogTM Copyright S Favio. Licensed by the author for use in [facility]; reprinted with permission (solizzy@[facility]). All rights reserved.         Reviewed and updated as needed this visit by clinical staff  Tobacco  Allergies  Meds  Med Hx  Surg Hx  Fam Hx  Soc Hx        Reviewed and updated as needed this visit by Provider        Social History   Substance Use Topics     Smoking status: Never Smoker     Smokeless tobacco: Never Used     Alcohol use No       If you drink alcohol do you typically have >3 drinks per day or >7 drinks per week? No                        Today's PHQ-2 Score:   PHQ-2 ( 1999 Pfizer) 8/21/2018 7/18/2017   Q1: Little interest or pleasure in doing things 0 0   Q2: Feeling down, depressed or hopeless 0 0   PHQ-2 Score 0 0   Q1: Little interest or pleasure in doing things Not at all -   Q2:  Feeling down, depressed or hopeless Not at all -   PHQ-2 Score 0 -       Do you feel safe in your environment - Yes    Do you have a Health Care Directive?: No: Advance care planning reviewed with patient; information given to patient to review.    Current providers sharing in care for this patient include:   Patient Care Team:  Alisia Jordan MD as PCP - General (Internal Medicine)    The following health maintenance items are reviewed in Epic and correct as of today:  Health Maintenance   Topic Date Due     ADVANCE DIRECTIVE PLANNING Q5 YRS  07/17/2017     DEPRESSION ACTION PLAN Q1 YR  07/23/2018     PNEUMOCOCCAL (1 of 2 - PCV13) 08/04/2018     FALL RISK ASSESSMENT  08/04/2018     MAMMO Q2 YR  09/16/2018     INFLUENZA VACCINE (1) 09/01/2018     PHQ-9 Q6 MONTHS  01/09/2019     LIPID MONITORING Q1 YEAR  08/16/2019     COLON CANCER SCREEN (SYSTEM ASSIGNED)  05/01/2020     PAP Q5 YEARS  07/09/2020     HPV Q5 YEARS (Complete with PAP)  07/09/2020     TETANUS IMMUNIZATION (SYSTEM ASSIGNED)  01/10/2024     DEXA SCAN SCREENING (SYSTEM ASSIGNED)  Completed     HIV SCREEN (SYSTEM ASSIGNED)  Completed     HEPATITIS C SCREENING  Completed     Patient Active Problem List   Diagnosis     Hypothyroidism     Advanced directives, counseling/discussion     Major depression     Low back pain     Hashimoto's thyroiditis     Hyperlipidemia LDL goal <130     Anxiety     Current Outpatient Prescriptions   Medication Sig Dispense Refill     ASPIRIN 81 MG OR TABS 1 tablet 3 times a week       CALCIUM-VITAMIN D PO Take by mouth daily       levothyroxine (SYNTHROID/LEVOTHROID) 75 MCG tablet Take 1 tablet (75 mcg) by mouth daily 90 tablet 3     Multiple Vitamin (MULTI-VITAMIN PO) Take by mouth as needed        Naproxen Sodium (ALEVE PO) Take 220 mg by mouth as needed for moderate pain       Omega-3 Fatty Acids (OMEGA-3 FISH OIL PO) Take by mouth as needed        ranitidine (ZANTAC 75) 75 MG tablet Take 75 mg by mouth daily. 1 daily        "sertraline (ZOLOFT) 100 MG tablet 125 mg daily 115 tablet 3     Plant Sterols and Stanols (CHOLESTOFF PO) Take by mouth daily       Review Of Systems  Skin: negative  Eyes: negative  Ears/Nose/Throat: negative  Respiratory: No dyspnea on exertion and No cough  Cardiovascular: negative  Gastrointestinal: negative  Genitourinary: negative  Musculoskeletal: negative  Neurologic: negative  Psychiatric: negative  Hematologic/Lymphatic/Immunologic: negative  Endocrine: negative   Gynecologic ros reveals:no breast pain or new or enlarging lumps on self exam, no vaginal bleeding, no discharge or pelvic pain    Objective:  Patient alert, in no acute distress  /72 (BP Location: Right arm, Patient Position: Sitting, Cuff Size: Adult Regular)  Pulse 84  Temp 98.1  F (36.7  C) (Oral)  Resp 14  Ht 5' 6.14\" (1.68 m)  Wt 134 lb (60.8 kg)  SpO2 98%  Breastfeeding? No  BMI 21.54 kg/m2    HEENT: extraocular movements are intact, pupils equal and reactive to light and accommodation, TMs clear, oropharynx clear  NECK: Neck supple. No adenopathy. Thyroid symmetric, normal size,, Carotids without bruits.  PULMONARY: clear to auscultation  CARDIAC: regular rate and rhythm and no murmurs, clicks, or gallops  PULSES: 2/2 throughout  BACK: no spinal or CVAT  ABDOMINAL: Soft, nontender.  Normal bowel sounds.  No hepatosplenomegaly or abnormal masses  BREAST: No breast masses or tenderness, No axillary masses or tenderness and No galactorrhea  PELVIC: external genitalia normal, , bimanual exam with normal uterus and adnexa,   REFLEXES: 2+ throughout  SKIN: unremarkable    PHQ-9 SCORE 7/23/2017 2/16/2018 7/15/2018   Total Score - - -   Total Score 0 0 1        Lab Results   Component Value Date    HDL 65 08/16/2018     08/16/2018    CHOL 261 08/16/2018    TRIG 124 08/16/2018          EKG: normal sinus rhythm, normal    ASSESSMENT / PLAN:   1. Welcome to Medicare preventive visit    - EKG 12-lead complete w/read - " "Clinics    2. Major depressive disorder with single episode, in full remission (H)  stable  - sertraline (ZOLOFT) 100 MG tablet; 125 mg daily  Dispense: 115 tablet; Refill: 3    3. Anxiety  stable  - sertraline (ZOLOFT) 100 MG tablet; 125 mg daily  Dispense: 115 tablet; Refill: 3    4. Acquired hypothyroidism  Recheck lab  - levothyroxine (SYNTHROID/LEVOTHROID) 75 MCG tablet; Take 1 tablet (75 mcg) by mouth daily  Dispense: 90 tablet; Refill: 3    5. Hyperlipidemia LDL goal <130  Discussed previous results, risk AHA calculation, would not recommend medication now.     6. Need for vaccination  Advised about vaccine  - Pneumococcal vaccine 13 valent PCV13 IM (Prevnar) [12388]  - 1st  Administration  [87713]    7. Encounter for screening mammogram for breast cancer  due  - *MA Screening Digital Bilateral; Future    End of Life Planning:  Patient currently has an advanced directive: No.  I have verified the patient's ablity to prepare an advanced directive/make health care decisions.  Literature was provided to assist patient in preparing an advanced directive.    COUNSELING:  Reviewed preventive health counseling, as reflected in patient instructions    BP Readings from Last 1 Encounters:   08/23/18 120/72     Estimated body mass index is 21.54 kg/(m^2) as calculated from the following:    Height as of this encounter: 5' 6.14\" (1.68 m).    Weight as of this encounter: 134 lb (60.8 kg).           reports that she has never smoked. She has never used smokeless tobacco.      Appropriate preventive services were discussed with this patient, including applicable screening as appropriate for cardiovascular disease, diabetes, osteopenia/osteoporosis, and glaucoma.  As appropriate for age/gender, discussed screening for colorectal cancer, prostate cancer, breast cancer, and cervical cancer. Checklist reviewing preventive services available has been given to the patient.    Reviewed patients plan of care and provided an AVS. " The Basic Care Plan (routine screening as documented in Health Maintenance) for Yessi meets the Care Plan requirement. This Care Plan has been established and reviewed with the Patient.    Counseling Resources:  ATP IV Guidelines  Pooled Cohorts Equation Calculator  Breast Cancer Risk Calculator  FRAX Risk Assessment  ICSI Preventive Guidelines  Dietary Guidelines for Americans, 2010  USDA's MyPlate  ASA Prophylaxis  Lung CA Screening    Alisia Jordan MD  [facility]

## 2018-08-23 NOTE — MR AVS SNAPSHOT
After Visit Summary   8/23/2018    Yessi Claire    MRN: [id number]           Patient Information     Date Of Birth          1955        Visit Information        Provider Department      8/23/2018 9:20 AM Alisia Jordan MD [facility]        Today's Diagnoses     Welcome to Medicare preventive visit    -  1    Major depressive disorder with single episode, in full remission (H)        Anxiety        Acquired hypothyroidism        Hyperlipidemia LDL goal <130        Encounter for screening mammogram for breast cancer          Care Instructions      PREVENTIVE HEALTH RECOMMENDATIONS:     Vaccines: Get a flu shot each year. Get a tetanus shot every 10 years.     Exercise for at least 150 minutes a week (an average of 30 minutes a day, 5 days of the week). This will help you control your weight and prevent disease.    Limit alcohol to one drink per day.    No smoking.     Wear sunscreen to prevent skin cancer.     See your dentist twice a year for an exam and cleaning.  Shingrix is the new shingles vaccine.      Try to get Calcium 1200 mg total per day. It is best to not take it all at once. Try to get Vitamin D at least 9665-6320 units per day.    BMI or Body Mass Index is a way of indicating weight and health risk for cardiovascular diseases, high blood pressure, diabetes.   Definitions:    Underweight is less than 18.5 and will be associated with health risk.   Normal BMI is 18.5 to 25   Overweight is 25-29   Obesity is 30 or greater   Morbid Obesity is 40 or greater or 35 or greater with diabetes, prediabetes or abnormal blood sugar, high blood pressure or elevated cholesterol  Obesity and Morbid Obesity are associated with higher health risks. Lowering calories, exercising more may lower your BMI and even small decreases can have positive impact on lowering health risks.   Your Body mass index is 21.54 kg/(m^2)..,              Follow-ups after your visit        Future tests that  "were ordered for you today     Open Future Orders        Priority Expected Expires Ordered    *MA Screening Digital Bilateral Routine  8/23/2019 8/23/2018            Who to contact     If you have questions or need follow up information about today's clinic visit or your schedule please contact [facility] directly at [phone].  Normal or non-critical lab and imaging results will be communicated to you by MyChart, letter or phone within 4 business days after the clinic has received the results. If you do not hear from us within 7 days, please contact the clinic through [employer] or phone. If you have a critical or abnormal lab result, we will notify you by phone as soon as possible.  Submit refill requests through [employer] or call your pharmacy and they will forward the refill request to us. Please allow 3 business days for your refill to be completed.          Additional Information About Your Visit        MyChart Information     [employer] gives you secure access to your electronic health record. If you see a primary care provider, you can also send messages to your care team and make appointments. If you have questions, please call your primary care clinic.  If you do not have a primary care provider, please call [phone] and they will assist you.        Care EveryWhere ID     This is your Care EveryWhere ID. This could be used by other organizations to access your [place] medical records  [id number]        Your Vitals Were     Pulse Temperature Respirations Height Pulse Oximetry Breastfeeding?    84 98.1  F (36.7  C) (Oral) 14 5' 6.14\" (1.68 m) 98% No    BMI (Body Mass Index)                   21.54 kg/m2            Blood Pressure from Last 3 Encounters:   08/23/18 120/72   07/09/18 122/70   02/16/18 132/66    Weight from Last 3 Encounters:   08/23/18 134 lb (60.8 kg)   07/09/18 138 lb 6.4 oz (62.8 kg)   02/16/18 140 lb (63.5 kg)              We Performed the Following     EKG 12-lead " complete w/read - Clinics          Where to get your medicines      Some of these will need a paper prescription and others can be bought over the counter.  Ask your nurse if you have questions.     Bring a paper prescription for each of these medications     levothyroxine 75 MCG tablet    sertraline 100 MG tablet          Primary Care Provider Office Phone # Fax #    Alisia Jordan [phone]       [address] [facility] [address]  [facility] [address]        Equal Access to Services     [facility] : Hadii aad ku hadasho Soomaali, waaxda luqadaha, qaybta kaalmada adeegyada, waxay idiin hayaan adeeg jenn patterson . So [facility] [phone].    ATENCIÓN: Si maula espmarlen, tiene a porter disposición servicios gratuitos de asistencia lingüística. [facility] [phone].    We comply with applicable federal civil rights laws and [place] laws. We do not discriminate on the basis of race, color, national origin, age, disability, sex, sexual orientation, or gender identity.            Thank you!     Thank you for choosing [facility]  for your care. Our goal is always to provide you with excellent care. Hearing back from our patients is one way we can continue to improve our services. Please take a few minutes to complete the written survey that you may receive in the mail after your visit with us. Thank you!             Your Updated Medication List - Protect others around you: Learn how to safely use, store and throw away your medicines at www.disposemymeds.org.          This list is accurate as of 8/23/18  9:59 AM.  Always use your most recent med list.                   Brand Name Dispense Instructions for use Diagnosis    ALEVE PO      Take 220 mg by mouth as needed for moderate pain        aspirin 81 MG tablet      1 tablet 3 times a week        CALCIUM-VITAMIN D PO      Take by mouth daily        CHOLESTOFF PO      Take by mouth daily        levothyroxine 75 MCG tablet    SYNTHROID/LEVOTHROID    90  tablet    Take 1 tablet (75 mcg) by mouth daily    Acquired hypothyroidism       MULTI-VITAMIN PO      Take by mouth as needed        OMEGA-3 FISH OIL PO      Take by mouth as needed        ranitidine 75 MG tablet   Generic drug:  ranitidine      Take 75 mg by mouth daily. 1 daily        sertraline 100 MG tablet    ZOLOFT    115 tablet    125 mg daily    Major depressive disorder with single episode, in full remission (H), Anxiety

## 2018-08-23 NOTE — PATIENT INSTRUCTIONS
PREVENTIVE HEALTH RECOMMENDATIONS:     Vaccines: Get a flu shot each year. Get a tetanus shot every 10 years.     Exercise for at least 150 minutes a week (an average of 30 minutes a day, 5 days of the week). This will help you control your weight and prevent disease.    Limit alcohol to one drink per day.    No smoking.     Wear sunscreen to prevent skin cancer.     See your dentist twice a year for an exam and cleaning.  Shingrix is the new shingles vaccine.      Try to get Calcium 1200 mg total per day. It is best to not take it all at once. Try to get Vitamin D at least 1578-7355 units per day.    BMI or Body Mass Index is a way of indicating weight and health risk for cardiovascular diseases, high blood pressure, diabetes.   Definitions:    Underweight is less than 18.5 and will be associated with health risk.   Normal BMI is 18.5 to 25   Overweight is 25-29   Obesity is 30 or greater   Morbid Obesity is 40 or greater or 35 or greater with diabetes, prediabetes or abnormal blood sugar, high blood pressure or elevated cholesterol  Obesity and Morbid Obesity are associated with higher health risks. Lowering calories, exercising more may lower your BMI and even small decreases can have positive impact on lowering health risks.   Your Body mass index is 21.54 kg/(m^2)..,

## 2018-08-28 ENCOUNTER — MYC MEDICAL ADVICE (OUTPATIENT)
Dept: INTERNAL MEDICINE | Facility: CLINIC | Age: 63
End: 2018-08-28

## 2018-08-28 DIAGNOSIS — E03.9 ACQUIRED HYPOTHYROIDISM: ICD-10-CM

## 2018-09-05 RX ORDER — LEVOTHYROXINE SODIUM 75 UG/1
75 TABLET ORAL DAILY
Qty: 90 TABLET | Refills: 3 | Status: SHIPPED | OUTPATIENT
Start: 2018-09-05 | End: 2019-03-04

## 2018-09-26 ENCOUNTER — HOSPITAL ENCOUNTER (OUTPATIENT)
Dept: MAMMOGRAPHY | Facility: CLINIC | Age: 63
Discharge: HOME | End: 2018-09-26
Attending: INTERNAL MEDICINE | Admitting: INTERNAL MEDICINE
Payer: MEDICARE

## 2018-09-26 DIAGNOSIS — Z12.31 ENCOUNTER FOR SCREENING MAMMOGRAM FOR BREAST CANCER: ICD-10-CM

## 2018-09-26 PROCEDURE — 77067 SCR MAMMO BI INCL CAD: CPT

## 2018-10-26 ENCOUNTER — ALLIED HEALTH/NURSE VISIT (OUTPATIENT)
Dept: NURSING | Facility: CLINIC | Age: 63
End: 2018-10-26
Payer: COMMERCIAL

## 2018-10-26 DIAGNOSIS — Z23 NEED FOR PROPHYLACTIC VACCINATION AND INOCULATION AGAINST INFLUENZA: Primary | ICD-10-CM

## 2018-10-26 PROCEDURE — G0008 ADMIN INFLUENZA VIRUS VAC: HCPCS

## 2018-10-26 PROCEDURE — 90662 IIV NO PRSV INCREASED AG IM: CPT

## 2018-10-26 NOTE — MR AVS SNAPSHOT
After Visit Summary   10/26/2018    Yessi Claire    MRN: [id number]           Patient Information     Date Of Birth          1955        Visit Information        Provider Department      10/26/2018 10:30 AM [facility] CLINIC [facility]        Today's Diagnoses     Need for prophylactic vaccination and inoculation against influenza    -  1       Follow-ups after your visit        Who to contact     If you have questions or need follow up information about today's clinic visit or your schedule please contact [facility] directly at [phone].  Normal or non-critical lab and imaging results will be communicated to you by [employer], letter or phone within 4 business days after the clinic has received the results. If you do not hear from us within 7 days, please contact the clinic through [employer] or phone. If you have a critical or abnormal lab result, we will notify you by phone as soon as possible.  Submit refill requests through [employer] or call your pharmacy and they will forward the refill request to us. Please allow 3 business days for your refill to be completed.          Additional Information About Your Visit        MyChart Information     [employer] gives you secure access to your electronic health record. If you see a primary care provider, you can also send messages to your care team and make appointments. If you have questions, please call your primary care clinic.  If you do not have a primary care provider, please call [phone] and they will assist you.        Care EveryWhere ID     This is your Care EveryWhere ID. This could be used by other organizations to access your [place] medical records  [id number]         Blood Pressure from Last 3 Encounters:   08/23/18 120/72   07/09/18 122/70   02/16/18 132/66    Weight from Last 3 Encounters:   08/23/18 134 lb (60.8 kg)   07/09/18 138 lb 6.4 oz (62.8 kg)   02/16/18 140 lb (63.5 kg)              We Performed the  Following     FLU VACCINE, INCREASED ANTIGEN, PRESV FREE, AGE 65+ [45371]     Vaccine Administration, Initial [[id number]]        Primary Care Provider Office Phone # Fax #    Alisia Jordan [phone]       [address]  [facility] [address]        Equal Access to Services     SACHIN GUZMAN : Hadii aad ku hadasho Soomaali, waaxda luqadaha, qaybta kaalmada adeegyada, waxay minnain haytatiannan adezeny mayitosheridan richardson. So [facility] [phone].    ATENCIÓN: Si habla español, tiene a porter disposición servicios gratuitos de asistencia lingüística. [facility] [phone].    We comply with applicable federal civil rights laws and [place] laws. We do not discriminate on the basis of race, color, national origin, age, disability, sex, sexual orientation, or gender identity.            Thank you!     [facility] you [facility]  [facility] your [facility]. Our goal is always to provide you with excellent care. Hearing back from our patients is one way we can continue to improve our services. Please take a few minutes to complete the written survey that you may receive in the mail after your visit with us. Thank you!             Your Updated Medication List - Protect others around you: Learn how to safely use, store and throw away your medicines at www.disposemymeds.org.          This list is accurate as of 10/26/18 10:31 AM.  Always use your most recent med list.                   Brand Name Dispense Instructions for use Diagnosis    ALEVE PO      Take 220 mg by mouth as needed for moderate pain        aspirin 81 MG tablet      1 tablet 3 times a week        CALCIUM-VITAMIN D PO      Take by mouth daily        CHOLESTOFF PO      Take by mouth daily        levothyroxine 75 MCG tablet    SYNTHROID/LEVOTHROID    90 tablet    Take 1 tablet (75 mcg) by mouth daily    Acquired hypothyroidism       MULTI-VITAMIN PO      Take by mouth as needed        OMEGA-3 FISH OIL PO      Take by mouth as needed        ranitidine  75 MG tablet   Generic drug:  ranitidine      Take 75 mg by mouth daily. 1 daily        sertraline 100 MG tablet    ZOLOFT    115 tablet    125 mg daily    Major depressive disorder with single episode, in full remission (H), Anxiety

## 2018-10-26 NOTE — PROGRESS NOTES

## 2019-03-03 ENCOUNTER — MYC MEDICAL ADVICE (OUTPATIENT)
Dept: INTERNAL MEDICINE | Facility: CLINIC | Age: 64
End: 2019-03-03

## 2019-03-03 DIAGNOSIS — F41.9 ANXIETY: ICD-10-CM

## 2019-03-03 DIAGNOSIS — E03.9 ACQUIRED HYPOTHYROIDISM: ICD-10-CM

## 2019-03-03 DIAGNOSIS — F32.5 MAJOR DEPRESSIVE DISORDER WITH SINGLE EPISODE, IN FULL REMISSION (H): ICD-10-CM

## 2019-03-04 RX ORDER — SERTRALINE HYDROCHLORIDE 100 MG/1
100 TABLET, FILM COATED ORAL DAILY
Qty: 90 TABLET | Refills: 1 | Status: SHIPPED | OUTPATIENT
Start: 2019-03-04 | End: 2019-08-29

## 2019-03-04 RX ORDER — LEVOTHYROXINE SODIUM 75 UG/1
75 TABLET ORAL DAILY
Qty: 90 TABLET | Refills: 1 | Status: SHIPPED | OUTPATIENT
Start: 2019-03-04 | End: 2019-08-29

## 2019-08-22 ASSESSMENT — ACTIVITIES OF DAILY LIVING (ADL): CURRENT_FUNCTION: NO ASSISTANCE NEEDED

## 2019-08-22 ASSESSMENT — ENCOUNTER SYMPTOMS
COUGH: 0
FREQUENCY: 0
SORE THROAT: 0
PALPITATIONS: 0
BREAST MASS: 0
DYSURIA: 0
HEADACHES: 0
HEARTBURN: 0
DIZZINESS: 0
ABDOMINAL PAIN: 0
PARESTHESIAS: 0
DIARRHEA: 0
NAUSEA: 0
MYALGIAS: 0
WEAKNESS: 0
CONSTIPATION: 0
CHILLS: 0
SHORTNESS OF BREATH: 0
NERVOUS/ANXIOUS: 0
HEMATOCHEZIA: 0
JOINT SWELLING: 0
EYE PAIN: 0
HEMATURIA: 0
ARTHRALGIAS: 0
FEVER: 0

## 2019-08-29 ENCOUNTER — OFFICE VISIT (OUTPATIENT)
Dept: INTERNAL MEDICINE | Facility: CLINIC | Age: 64
End: 2019-08-29
Payer: COMMERCIAL

## 2019-08-29 VITALS
RESPIRATION RATE: 20 BRPM | TEMPERATURE: 98 F | DIASTOLIC BLOOD PRESSURE: 64 MMHG | BODY MASS INDEX: 22.59 KG/M2 | HEART RATE: 86 BPM | HEIGHT: 65 IN | SYSTOLIC BLOOD PRESSURE: 102 MMHG | WEIGHT: 135.6 LBS

## 2019-08-29 DIAGNOSIS — E03.9 ACQUIRED HYPOTHYROIDISM: ICD-10-CM

## 2019-08-29 DIAGNOSIS — Z23 PNEUMOCOCCAL VACCINATION ADMINISTERED AT CURRENT VISIT: ICD-10-CM

## 2019-08-29 DIAGNOSIS — F32.5 MAJOR DEPRESSIVE DISORDER WITH SINGLE EPISODE, IN FULL REMISSION (H): ICD-10-CM

## 2019-08-29 DIAGNOSIS — Z13.1 SCREENING FOR DIABETES MELLITUS: ICD-10-CM

## 2019-08-29 DIAGNOSIS — K13.0 CHEILITIS: ICD-10-CM

## 2019-08-29 DIAGNOSIS — E78.5 HYPERLIPIDEMIA LDL GOAL <130: ICD-10-CM

## 2019-08-29 DIAGNOSIS — F41.9 ANXIETY: ICD-10-CM

## 2019-08-29 DIAGNOSIS — Z00.00 ENCOUNTER FOR ROUTINE ADULT HEALTH EXAMINATION WITHOUT ABNORMAL FINDINGS: Primary | ICD-10-CM

## 2019-08-29 LAB
BASOPHILS # BLD AUTO: 0 10E9/L (ref 0–0.2)
BASOPHILS NFR BLD AUTO: 0.4 %
DIFFERENTIAL METHOD BLD: NORMAL
EOSINOPHIL # BLD AUTO: 0.2 10E9/L (ref 0–0.7)
EOSINOPHIL NFR BLD AUTO: 4.4 %
ERYTHROCYTE [DISTWIDTH] IN BLOOD BY AUTOMATED COUNT: 14.8 % (ref 10–15)
HCT VFR BLD AUTO: 38 % (ref 35–47)
HGB BLD-MCNC: 12 G/DL (ref 11.7–15.7)
LYMPHOCYTES # BLD AUTO: 1.6 10E9/L (ref 0.8–5.3)
LYMPHOCYTES NFR BLD AUTO: 30.6 %
MCH RBC QN AUTO: 28.2 PG (ref 26.5–33)
MCHC RBC AUTO-ENTMCNC: 31.6 G/DL (ref 31.5–36.5)
MCV RBC AUTO: 89 FL (ref 78–100)
MONOCYTES # BLD AUTO: 0.3 10E9/L (ref 0–1.3)
MONOCYTES NFR BLD AUTO: 5.9 %
NEUTROPHILS # BLD AUTO: 3.1 10E9/L (ref 1.6–8.3)
NEUTROPHILS NFR BLD AUTO: 58.7 %
PLATELET # BLD AUTO: 276 10E9/L (ref 150–450)
RBC # BLD AUTO: 4.26 10E12/L (ref 3.8–5.2)
VIT B12 SERPL-MCNC: 372 PG/ML (ref 193–986)
WBC # BLD AUTO: 5.3 10E9/L (ref 4–11)

## 2019-08-29 PROCEDURE — 80061 LIPID PANEL: CPT | Performed by: INTERNAL MEDICINE

## 2019-08-29 PROCEDURE — 85025 COMPLETE CBC W/AUTO DIFF WBC: CPT | Performed by: INTERNAL MEDICINE

## 2019-08-29 PROCEDURE — 36415 COLL VENOUS BLD VENIPUNCTURE: CPT | Performed by: INTERNAL MEDICINE

## 2019-08-29 PROCEDURE — G0009 ADMIN PNEUMOCOCCAL VACCINE: HCPCS | Performed by: INTERNAL MEDICINE

## 2019-08-29 PROCEDURE — 80048 BASIC METABOLIC PNL TOTAL CA: CPT | Performed by: INTERNAL MEDICINE

## 2019-08-29 PROCEDURE — 90732 PPSV23 VACC 2 YRS+ SUBQ/IM: CPT | Performed by: INTERNAL MEDICINE

## 2019-08-29 PROCEDURE — 99214 OFFICE O/P EST MOD 30 MIN: CPT | Mod: 25 | Performed by: INTERNAL MEDICINE

## 2019-08-29 PROCEDURE — 82607 VITAMIN B-12: CPT | Performed by: INTERNAL MEDICINE

## 2019-08-29 PROCEDURE — 99207 C PAF COMPLETED  NO CHARGE: CPT | Mod: 25 | Performed by: INTERNAL MEDICINE

## 2019-08-29 PROCEDURE — 84443 ASSAY THYROID STIM HORMONE: CPT | Performed by: INTERNAL MEDICINE

## 2019-08-29 PROCEDURE — 99397 PER PM REEVAL EST PAT 65+ YR: CPT | Mod: 25 | Performed by: INTERNAL MEDICINE

## 2019-08-29 RX ORDER — SERTRALINE HYDROCHLORIDE 100 MG/1
100 TABLET, FILM COATED ORAL DAILY
Qty: 90 TABLET | Refills: 3 | Status: SHIPPED | OUTPATIENT
Start: 2019-08-29 | End: 2020-08-26

## 2019-08-29 RX ORDER — LEVOTHYROXINE SODIUM 75 UG/1
75 TABLET ORAL DAILY
Qty: 90 TABLET | Refills: 3 | Status: SHIPPED | OUTPATIENT
Start: 2019-08-29 | End: 2020-08-26

## 2019-08-29 ASSESSMENT — ENCOUNTER SYMPTOMS
HEADACHES: 0
CONSTIPATION: 0
SORE THROAT: 0
EYE PAIN: 0
MYALGIAS: 0
ABDOMINAL PAIN: 0
HEMATOCHEZIA: 0
DIZZINESS: 0
HEMATURIA: 0
WEAKNESS: 0
SHORTNESS OF BREATH: 0
COUGH: 0
HEARTBURN: 0
DIARRHEA: 0
ARTHRALGIAS: 0
DYSURIA: 0
BREAST MASS: 0
PALPITATIONS: 0
NERVOUS/ANXIOUS: 0
PARESTHESIAS: 0
CHILLS: 0
FREQUENCY: 0
FEVER: 0
NAUSEA: 0
JOINT SWELLING: 0

## 2019-08-29 ASSESSMENT — PATIENT HEALTH QUESTIONNAIRE - PHQ9
SUM OF ALL RESPONSES TO PHQ QUESTIONS 1-9: 0
10. IF YOU CHECKED OFF ANY PROBLEMS, HOW DIFFICULT HAVE THESE PROBLEMS MADE IT FOR YOU TO DO YOUR WORK, TAKE CARE OF THINGS AT HOME, OR GET ALONG WITH OTHER PEOPLE: NOT DIFFICULT AT ALL
SUM OF ALL RESPONSES TO PHQ QUESTIONS 1-9: 0

## 2019-08-29 ASSESSMENT — MIFFLIN-ST. JEOR: SCORE: 1159.92

## 2019-08-29 ASSESSMENT — ACTIVITIES OF DAILY LIVING (ADL): CURRENT_FUNCTION: NO ASSISTANCE NEEDED

## 2019-08-29 NOTE — NURSING NOTE
"Vital signs:  Temp: 98  F (36.7  C) Temp src: Oral BP: 102/64 Pulse: 86   Resp: 20       Height: 165.7 cm (5' 5.25\") Weight: 61.5 kg (135 lb 9.6 oz)  Estimated body mass index is 22.39 kg/m  as calculated from the following:    Height as of this encounter: 1.657 m (5' 5.25\").    Weight as of this encounter: 61.5 kg (135 lb 9.6 oz).          "

## 2019-08-29 NOTE — PROGRESS NOTES
"SUBJECTIVE:   Yessi Claire is a 64 year old female who presents for Preventive Visit.  Are you in the first 12 months of your Medicare coverage?  No    Healthy Habits:     In general, how would you rate your overall health?  Excellent    Frequency of exercise:  4-5 days/week    Duration of exercise:  45-60 minutes    Do you usually eat at least 4 servings of fruit and vegetables a day, include whole grains    & fiber and avoid regularly eating high fat or \"junk\" foods?  Yes    Taking medications regularly:  Yes    Medication side effects:  None    Ability to successfully perform activities of daily living:  No assistance needed    Home Safety:  No safety concerns identified    Hearing Impairment:  No hearing concerns    In the past 6 months, have you been bothered by leaking of urine?  No    In general, how would you rate your overall mental or emotional health?  Excellent      PHQ-2 Total Score: 0    Additional concerns today:  No    Concerns:  1.  She has been having some issues with her lips this winter.  Several episodes where she had a lot of redness and sores in the corners but she has also been having some ongoing irritation across the lips.  She finds that they are much more sensitive to things that she cannot wear lipstick at all, tomato based, citrus and pineapple really cause a lot of burning.     2.  She has questions about whether she should have a heart scan done.  She is concerned about her risk for artery disease.  Last year her LDL was 171 but her risk [facility]     3.  Is wondering about having the blood counts done.  She is not having any bleeding symptoms, fever sweats, fatigue or shortness of breath.  She reports that her  gets a checked every year and she    Problems:   1.  Hyperlipidemia: She reports her diet is stable she reports good control of symptoms  2.  Major depression and anxiety: Reports good control of her symptoms with her " medication.  3.  Hypothyroidism: Stable    Do you feel safe in your environment? Yes    Do you have a Health Care Directive? No: Advance care planning reviewed with patient; information given to patient to review.      Fall risk  Fallen 2 or more times in the past year?: No  Any fall with injury in the past year?: No    Cognitive Screening   1) Repeat 3 items (Leader, Season, Table)    2) Clock draw: NORMAL  3) 3 item recall: Recalls 3 objects  Results: 3 items recalled: COGNITIVE IMPAIRMENT LESS LIKELY    Mini-CogTM Copyright S Fav[employer]. Licensed by the author for use in [facility]; reprinted with permission (moni@[facility]). All rights reserved.      Do you have sleep apnea, excessive snoring or daytime drowsiness?: no    Reviewed and updated as needed this visit by clinical staff         Reviewed and updated as needed this visit by Provider        Social History     Tobacco Use     Smoking status: Never Smoker     Smokeless tobacco: Never Used   Substance Use Topics     Alcohol use: No     If you drink alcohol do you typically have >3 drinks per day or >7 drinks per week? No    Alcohol Use 8/22/2019   Prescreen: >3 drinks/day or >7 drinks/week? Not Applicable   Prescreen: >3 drinks/day or >7 drinks/week? -   No flowsheet data found.            Current providers sharing in care for this patient include:   Patient Care Team:  Alisia Jordan MD as PCP - General (Internal Medicine)  Alisia Jordan MD as Assigned PCP    The following health maintenance items are reviewed in Epic and correct as of today:  Health Maintenance   Topic Date Due     ADVANCE CARE PLANNING  07/17/2017     PHQ-9  01/09/2019     LIPID  08/16/2019     FALL RISK ASSESSMENT  08/23/2019     ZOSTER IMMUNIZATION (2 of 2) 08/02/2019     MEDICARE ANNUAL WELLNESS VISIT  08/23/2019     PNEUMOCOCCAL IMMUNIZATION 65+ LOW/MEDIUM RISK (2 of 2 - PPSV23) 08/23/2019     INFLUENZA VACCINE (1) 09/01/2019     COLONOSCOPY  05/01/2020     MAMMO SCREENING   09/26/2020     DTAP/TDAP/TD IMMUNIZATION (3 - Td) 01/10/2024     DEXA  Completed     HEPATITIS C SCREENING  Completed     DEPRESSION ACTION PLAN  Completed     IPV IMMUNIZATION  Aged Out     MENINGITIS IMMUNIZATION  Aged Out       Patient Active Problem List   Diagnosis     Hypothyroidism     Advanced directives, counseling/discussion     Major depression     Low back pain     Hashimoto's thyroiditis     Hyperlipidemia LDL goal <130     Anxiety     Current Outpatient Medications   Medication Sig Dispense Refill     ASPIRIN 81 MG OR TABS 1 tablet 3 times a week       CALCIUM-VITAMIN D PO Take by mouth daily       levothyroxine (SYNTHROID/LEVOTHROID) 75 MCG tablet Take 1 tablet (75 mcg) by mouth daily 90 tablet 1     Multiple Vitamin (MULTI-VITAMIN PO) Take by mouth as needed        Naproxen Sodium (ALEVE PO) Take 220 mg by mouth as needed for moderate pain       Omega-3 Fatty Acids (OMEGA-3 FISH OIL PO) Take by mouth as needed        ranitidine (ZANTAC 75) 75 MG tablet Take 75 mg by mouth daily. 1 daily       sertraline (ZOLOFT) 100 MG tablet Take 1 tablet (100 mg) by mouth daily 90 tablet 1        Review of Systems   Constitutional: Negative for chills and fever.   HENT: Negative for congestion, ear pain, hearing loss and sore throat.    Eyes: Negative for pain and visual disturbance.   Respiratory: Negative for cough and shortness of breath.    Cardiovascular: Negative for chest pain, palpitations and peripheral edema.   Gastrointestinal: Negative for abdominal pain, constipation, diarrhea, heartburn, hematochezia and nausea.   Breasts:  Negative for tenderness, breast mass and discharge.   Genitourinary: Negative for dysuria, frequency, genital sores, hematuria, pelvic pain, urgency, vaginal bleeding and vaginal discharge.   Musculoskeletal: Negative for arthralgias, joint swelling and myalgias.   Skin: Negative for rash.   Neurological: Negative for dizziness, weakness, headaches and paresthesias.  "  Psychiatric/Behavioral: Negative for mood changes. The patient is not nervous/anxious.          OBJECTIVE:   There were no vitals taken for this visit. Estimated body mass index is 21.54 kg/m  as calculated from the following:    Height as of 8/23/18: 1.68 m (5' 6.14\").    Weight as of 8/23/18: 60.8 kg (134 lb).  Physical Exam    Objective:  Patient alert, in no acute distress  /64   Pulse 86   Temp 98  F (36.7  C) (Oral)   Resp 20   Ht 1.657 m (5' 5.25\")   Wt 61.5 kg (135 lb 9.6 oz)   BMI 22.39 kg/m      HEENT: extraocular movements are intact, pupils equal and reactive to light and accommodation, TMs clear, oropharynx clear  NECK: Neck supple. No adenopathy. Thyroid symmetric, normal size,, Carotids without bruits.  PULMONARY: clear to auscultation  CARDIAC: regular rate and rhythm and no murmurs, clicks, or gallops  PULSES: 2/2 throughout  BACK: no spinal or CVAT  ABDOMINAL: Soft, nontender.  Normal bowel sounds.  No hepatosplenomegaly or abnormal masses  BREAST: No breast masses or tenderness, No axillary masses or tenderness and No galactorrhea  PELVIC: external genitalia normal,  bimanual exam with normal uterus and adnexa,   REFLEXES: 2+ throughout  SKIN: unremarkable    PHQ-9 SCORE 2/16/2018 7/15/2018 8/29/2019   PHQ-9 Total Score - - -   PHQ-9 Total Score MyChart - - 0   PHQ-9 Total Score 0 1 0     OSCAR-7 SCORE 2/16/2018 7/15/2018   Total Score 0 3            ASSESSMENT / PLAN:   1. Encounter for routine adult health examination without abnormal findings  Due for Pneumovax 23, she will need her other shingles in the future    2. Major depressive disorder with single episode, in full remission (H)  Doing well, continue medication  - sertraline (ZOLOFT) 100 MG tablet; Take 1 tablet (100 mg) by mouth daily  Dispense: 90 tablet; Refill: 3    3. Hyperlipidemia LDL goal <130  Recheck labs, reassess her risks, advised that heart scan will probably not change anything or help much  - Lipid panel reflex " "to direct LDL Fasting    4. Acquired hypothyroidism  Clinically stable, check labs  - levothyroxine (SYNTHROID/LEVOTHROID) 75 MCG tablet; Take 1 tablet (75 mcg) by mouth daily  Dispense: 90 tablet; Refill: 3  - TSH with free T4 reflex    5. Cheilitis  We will go ahead and check a B12, will do her blood counts per her request, may need to follow-up with dermatology in the future  - CBC with platelets and differential  - Vitamin B12  - DERMATOLOGY REFERRAL    6. Anxiety  Doing well, continue medication  - sertraline (ZOLOFT) 100 MG tablet; Take 1 tablet (100 mg) by mouth daily  Dispense: 90 tablet; Refill: 3    7. Screening for diabetes mellitus    - Basic metabolic panel  (Ca, Cl, CO2, Creat, Gluc, K, Na, BUN)    8. Pneumococcal vaccination administered at current visit    - PNEUMOCOCCAL VACCINE,ADULT,SQ OR IM    End of Life Planning:  Patient currently has an advanced directive: No.  I have verified the patient's ablity to prepare an advanced directive/make health care decisions.  Literature was provided to assist patient in preparing an advanced directive.    COUNSELING:  Reviewed preventive health counseling, as reflected in patient instructions    Estimated body mass index is 21.54 kg/m  as calculated from the following:    Height as of 8/23/18: 1.68 m (5' 6.14\").    Weight as of 8/23/18: 60.8 kg (134 lb).         reports that she has never smoked. She has never used smokeless tobacco.      Appropriate preventive services were discussed with this patient, including applicable screening as appropriate for cardiovascular disease, diabetes, osteopenia/osteoporosis, and glaucoma.  As appropriate for age/gender, discussed screening for colorectal cancer, prostate cancer, breast cancer, and cervical cancer. Checklist reviewing preventive services available has been given to the patient.    Reviewed patients plan of care and provided an AVS. The Basic Care Plan (routine screening as documented in Health Maintenance) for " Yessi meets the Care Plan requirement. This Care Plan has been established and reviewed with the Patient.    Counseling Resources:  ATP IV Guidelines  Pooled Cohorts Equation Calculator  Breast Cancer Risk Calculator  FRAX Risk Assessment  ICSI Preventive Guidelines  Dietary Guidelines for Americans, 2010  USDA's MyPlate  ASA Prophylaxis  Lung CA Screening    Alisia Jordan MD  [facility]    Identified Health Risks:  Answers for HPI/ROS submitted by the patient on 8/29/2019   Annual Exam:  If you checked off any problems, how difficult have these problems made it for you to do your work, take care of things at home, or get along with other people?: Not difficult at all  PHQ9 TOTAL SCORE: 0

## 2019-08-29 NOTE — PATIENT INSTRUCTIONS
PREVENTIVE HEALTH RECOMMENDATIONS:     Vaccines: Get a flu shot each year. Get a tetanus shot every 10 years.     Exercise for at least 150 minutes a week (an average of 30 minutes a day, 5 days of the week). This will help you control your weight and prevent disease.    Limit alcohol to one drink per day.    No smoking.     Wear sunscreen to prevent skin cancer.     See your dentist twice a year for an exam and cleaning.    Try to get Calcium 1200 mg total per day. It is best to not take it all at once. Try to get Vitamin D at least 3773-6982 units (25-50 mcg) per day.    BMI or Body Mass Index is a way of indicating weight and health risk for cardiovascular diseases, high blood pressure, diabetes.   Definitions:    Underweight is less than 18.5 and will be associated with health risk.   Normal BMI is 18.5 to 25   Overweight is 25-29   Obesity is 30 or greater   Morbid Obesity is 40 or greater or 35 or greater with diabetes, prediabetes or abnormal blood sugar, high blood pressure or elevated cholesterol  Obesity and Morbid Obesity are associated with higher health risks. Lowering calories, exercising more may lower your BMI and even small decreases can have positive impact on lowering health risks.   Your Body mass index is 22.39 kg/m ..,

## 2019-08-29 NOTE — NURSING NOTE
Prior to immunization administration, verified patients identity using patient s name and date of birth. Please see Immunization Activity for additional information.     Screening Questionnaire for Adult Immunization    Are you sick today?   No   Do you have allergies to medications, food, a vaccine component or latex?   No   Have you ever had a serious reaction after receiving a vaccination?   No   Do you have a long-term health problem with heart disease, lung disease, asthma, kidney disease, metabolic disease (e.g. diabetes), anemia, or other blood disorder?   No   Do you have cancer, leukemia, HIV/AIDS, or any other immune system problem?   No   In the past 3 months, have you taken medications that affect  your immune system, such as prednisone, other steroids, or anticancer drugs; drugs for the treatment of rheumatoid arthritis, Crohn s disease, or psoriasis; or have you had radiation treatments?   No   Have you had a seizure, or a brain or other nervous system problem?   No   During the past year, have you received a transfusion of blood or blood     products, or been given immune (gamma) globulin or antiviral drug?   No   For women: Are you pregnant or is there a chance you could become        pregnant during the next month?   No   Have you received any vaccinations in the past 4 weeks?   No     Immunization questionnaire answers were all negative.        Per orders of Dr. Jordan, injection of Pneumococcal given by Chetan Vicente CMA. Patient instructed to remain in clinic for 15 minutes afterwards, and to report any adverse reaction to me immediately.       Screening performed by Chetan Vicente CMA on 8/29/2019 at 10:21 AM.

## 2019-08-30 LAB
ANION GAP SERPL CALCULATED.3IONS-SCNC: 7 MMOL/L (ref 3–14)
BUN SERPL-MCNC: 26 MG/DL (ref 7–30)
CALCIUM SERPL-MCNC: 9.3 MG/DL (ref 8.5–10.1)
CHLORIDE SERPL-SCNC: 108 MMOL/L (ref 94–109)
CHOLEST SERPL-MCNC: 259 MG/DL
CO2 SERPL-SCNC: 27 MMOL/L (ref 20–32)
CREAT SERPL-MCNC: 0.92 MG/DL (ref 0.52–1.04)
GFR SERPL CREATININE-BSD FRML MDRD: 65 ML/MIN/{1.73_M2}
GLUCOSE SERPL-MCNC: 99 MG/DL (ref 70–99)
HDLC SERPL-MCNC: 77 MG/DL
LDLC SERPL CALC-MCNC: 163 MG/DL
NONHDLC SERPL-MCNC: 182 MG/DL
POTASSIUM SERPL-SCNC: 5 MMOL/L (ref 3.4–5.3)
SODIUM SERPL-SCNC: 142 MMOL/L (ref 133–144)
TRIGL SERPL-MCNC: 93 MG/DL
TSH SERPL DL<=0.005 MIU/L-ACNC: 1.86 MU/L (ref 0.4–4)

## 2019-08-30 ASSESSMENT — PATIENT HEALTH QUESTIONNAIRE - PHQ9: SUM OF ALL RESPONSES TO PHQ QUESTIONS 1-9: 0

## 2019-10-11 ENCOUNTER — ALLIED HEALTH/NURSE VISIT (OUTPATIENT)
Dept: NURSING | Facility: CLINIC | Age: 64
End: 2019-10-11
Payer: COMMERCIAL

## 2019-10-11 DIAGNOSIS — Z23 NEED FOR PROPHYLACTIC VACCINATION AND INOCULATION AGAINST INFLUENZA: Primary | ICD-10-CM

## 2019-10-11 PROCEDURE — 90662 IIV NO PRSV INCREASED AG IM: CPT

## 2019-10-11 PROCEDURE — 99207 ZZC NO CHARGE NURSE ONLY: CPT

## 2019-10-11 PROCEDURE — G0008 ADMIN INFLUENZA VIRUS VAC: HCPCS

## 2020-08-21 ENCOUNTER — MYC MEDICAL ADVICE (OUTPATIENT)
Dept: INTERNAL MEDICINE | Facility: CLINIC | Age: 65
End: 2020-08-21

## 2020-08-24 DIAGNOSIS — F32.5 MAJOR DEPRESSIVE DISORDER WITH SINGLE EPISODE, IN FULL REMISSION (H): ICD-10-CM

## 2020-08-24 DIAGNOSIS — E03.9 ACQUIRED HYPOTHYROIDISM: ICD-10-CM

## 2020-08-24 DIAGNOSIS — F41.9 ANXIETY: ICD-10-CM

## 2020-08-26 RX ORDER — SERTRALINE HYDROCHLORIDE 100 MG/1
100 TABLET, FILM COATED ORAL DAILY
Qty: 90 TABLET | Refills: 0 | Status: SHIPPED | OUTPATIENT
Start: 2020-08-26 | End: 2020-09-21

## 2020-08-26 RX ORDER — LEVOTHYROXINE SODIUM 75 UG/1
75 TABLET ORAL DAILY
Qty: 90 TABLET | Refills: 0 | Status: SHIPPED | OUTPATIENT
Start: 2020-08-26 | End: 2020-09-21

## 2020-08-26 NOTE — TELEPHONE ENCOUNTER
Next 5 appointments (look out 90 days)    Sep 21, 2020  9:20 AM CDT  PHYSICAL with Alisia Jordan MD  [facility] ([facility]) [address]  [facility] [address] [phone]         Medication is being filled for 1 time refill only due to:  Future appointment scheduled

## 2020-09-18 ASSESSMENT — ACTIVITIES OF DAILY LIVING (ADL): CURRENT_FUNCTION: NO ASSISTANCE NEEDED

## 2020-09-18 ASSESSMENT — ENCOUNTER SYMPTOMS
NAUSEA: 0
FREQUENCY: 0
JOINT SWELLING: 0
SHORTNESS OF BREATH: 0
WEAKNESS: 0
ABDOMINAL PAIN: 0
DYSURIA: 0
FEVER: 0
SORE THROAT: 0
PALPITATIONS: 0
CONSTIPATION: 0
CHILLS: 0
HEADACHES: 0
EYE PAIN: 0
HEARTBURN: 0
HEMATOCHEZIA: 0
PARESTHESIAS: 0
BREAST MASS: 0
DIZZINESS: 0
COUGH: 0
HEMATURIA: 0
ARTHRALGIAS: 0
MYALGIAS: 0
DIARRHEA: 0
NERVOUS/ANXIOUS: 0

## 2020-09-21 ENCOUNTER — OFFICE VISIT (OUTPATIENT)
Dept: INTERNAL MEDICINE | Facility: CLINIC | Age: 65
End: 2020-09-21
Payer: COMMERCIAL

## 2020-09-21 VITALS
DIASTOLIC BLOOD PRESSURE: 60 MMHG | TEMPERATURE: 98.4 F | OXYGEN SATURATION: 97 % | RESPIRATION RATE: 18 BRPM | BODY MASS INDEX: 22.18 KG/M2 | WEIGHT: 138 LBS | HEIGHT: 66 IN | SYSTOLIC BLOOD PRESSURE: 120 MMHG | HEART RATE: 110 BPM

## 2020-09-21 DIAGNOSIS — M54.50 RIGHT LOW BACK PAIN, UNSPECIFIED CHRONICITY, UNSPECIFIED WHETHER SCIATICA PRESENT: ICD-10-CM

## 2020-09-21 DIAGNOSIS — Z78.0 ASYMPTOMATIC POSTMENOPAUSAL STATUS: ICD-10-CM

## 2020-09-21 DIAGNOSIS — F32.5 MAJOR DEPRESSIVE DISORDER WITH SINGLE EPISODE, IN FULL REMISSION (H): ICD-10-CM

## 2020-09-21 DIAGNOSIS — Z12.31 ENCOUNTER FOR SCREENING MAMMOGRAM FOR BREAST CANCER: ICD-10-CM

## 2020-09-21 DIAGNOSIS — E78.5 HYPERLIPIDEMIA LDL GOAL <130: ICD-10-CM

## 2020-09-21 DIAGNOSIS — Z12.11 SCREEN FOR COLON CANCER: ICD-10-CM

## 2020-09-21 DIAGNOSIS — F41.9 ANXIETY: ICD-10-CM

## 2020-09-21 DIAGNOSIS — Z13.1 SCREENING FOR DIABETES MELLITUS: ICD-10-CM

## 2020-09-21 DIAGNOSIS — E03.9 ACQUIRED HYPOTHYROIDISM: ICD-10-CM

## 2020-09-21 DIAGNOSIS — Z00.00 ENCOUNTER FOR MEDICARE ANNUAL WELLNESS EXAM: Primary | ICD-10-CM

## 2020-09-21 PROCEDURE — 36415 COLL VENOUS BLD VENIPUNCTURE: CPT | Performed by: INTERNAL MEDICINE

## 2020-09-21 PROCEDURE — 99214 OFFICE O/P EST MOD 30 MIN: CPT | Mod: 25 | Performed by: INTERNAL MEDICINE

## 2020-09-21 PROCEDURE — G0008 ADMIN INFLUENZA VIRUS VAC: HCPCS | Performed by: INTERNAL MEDICINE

## 2020-09-21 PROCEDURE — 90662 IIV NO PRSV INCREASED AG IM: CPT | Performed by: INTERNAL MEDICINE

## 2020-09-21 PROCEDURE — 96127 BRIEF EMOTIONAL/BEHAV ASSMT: CPT | Performed by: INTERNAL MEDICINE

## 2020-09-21 PROCEDURE — 99397 PER PM REEVAL EST PAT 65+ YR: CPT | Mod: 25 | Performed by: INTERNAL MEDICINE

## 2020-09-21 PROCEDURE — 80048 BASIC METABOLIC PNL TOTAL CA: CPT | Performed by: INTERNAL MEDICINE

## 2020-09-21 PROCEDURE — 80061 LIPID PANEL: CPT | Performed by: INTERNAL MEDICINE

## 2020-09-21 PROCEDURE — 84443 ASSAY THYROID STIM HORMONE: CPT | Performed by: INTERNAL MEDICINE

## 2020-09-21 RX ORDER — LEVOTHYROXINE SODIUM 75 UG/1
75 TABLET ORAL DAILY
Qty: 90 TABLET | Refills: 3 | Status: SHIPPED | OUTPATIENT
Start: 2020-09-21 | End: 2020-12-03

## 2020-09-21 RX ORDER — SERTRALINE HYDROCHLORIDE 100 MG/1
100 TABLET, FILM COATED ORAL DAILY
Qty: 90 TABLET | Refills: 3 | Status: SHIPPED | OUTPATIENT
Start: 2020-09-21 | End: 2020-12-03

## 2020-09-21 ASSESSMENT — ENCOUNTER SYMPTOMS
EYE PAIN: 0
ABDOMINAL PAIN: 0
FEVER: 0
SORE THROAT: 0
FREQUENCY: 0
NERVOUS/ANXIOUS: 0
CONSTIPATION: 0
SHORTNESS OF BREATH: 0
JOINT SWELLING: 0
HEMATURIA: 0
BREAST MASS: 0
NAUSEA: 0
PARESTHESIAS: 0
PALPITATIONS: 0
DIARRHEA: 0
HEMATOCHEZIA: 0
HEARTBURN: 0
DIZZINESS: 0
MYALGIAS: 0
DYSURIA: 0
WEAKNESS: 0
ARTHRALGIAS: 0
CHILLS: 0
COUGH: 0
HEADACHES: 0

## 2020-09-21 ASSESSMENT — ACTIVITIES OF DAILY LIVING (ADL): CURRENT_FUNCTION: NO ASSISTANCE NEEDED

## 2020-09-21 ASSESSMENT — MIFFLIN-ST. JEOR: SCORE: 1177.71

## 2020-09-21 NOTE — PROGRESS NOTES
"SUBJECTIVE:   Yessi Claire is a 65 year old female who presents for Preventive Visit.      Patient has been advised of split billing requirements and indicates understanding: Yes   Are you in the first 12 months of your Medicare coverage?  No    Healthy Habits:     In general, how would you rate your overall health?  Excellent    Frequency of exercise:  4-5 days/week    Duration of exercise:  45-60 minutes    Do you usually eat at least 4 servings of fruit and vegetables a day, include whole grains    & fiber and avoid regularly eating high fat or \"junk\" foods?  Yes    Taking medications regularly:  Yes    Medication side effects:  None    Ability to successfully perform activities of daily living:  No assistance needed    Home Safety:  No safety concerns identified    Hearing Impairment:  No hearing concerns    In the past 6 months, have you been bothered by leaking of urine?  No    In general, how would you rate your overall mental or emotional health?  Excellent      PHQ-2 Total Score: 0    Additional concerns today:  No    Do you feel safe in your environment? Yes    Have you ever done Advance Care Planning? (For example, a Health Directive, POLST, or a discussion with a medical provider or your loved ones about your wishes): No, advance care planning information given to patient to review.  Patient plans to discuss their wishes with loved ones or provider.        Fall risk  Fallen 2 or more times in the past year?: No  Any fall with injury in the past year?: No      1) Repeat 3 items (Leader, Season, Table)    2) Clock draw: NORMAL  3) 3 item recall: Recalls 3 objects  Results: 3 items recalled: COGNITIVE IMPAIRMENT LESS LIKELY    Mini-CogTM Maciel Stahl. Licensed by the author for use in [facility]; reprinted with permission ([email or web address]). All rights reserved.      Do you have sleep apnea, excessive snoring or daytime drowsiness?: no    Reviewed and updated as needed this visit by " clinical staff         Problems:  1.  Major depression and anxiety: She reports excellent control of symptoms.  No concerns about the medication.  2.  Hypothyroidism: No symptoms to suggest her thyroid is off.  3.  Hyperlipidemia: Diet is stable  4.  Back pain: She has been having some issues on the right side recently.  This is not severe.      Patient Active Problem List   Diagnosis     Hypothyroidism     Advanced directives, counseling/discussion     Major depression     Low back pain     Hashimoto's thyroiditis     Hyperlipidemia LDL goal <130     Anxiety     Current Outpatient Medications   Medication Sig Dispense Refill     ASPIRIN 81 MG OR TABS 1 tablet 3 times a week       CALCIUM-VITAMIN D PO Take by mouth daily       levothyroxine (SYNTHROID/LEVOTHROID) 75 MCG tablet Take 1 tablet (75 mcg) by mouth daily 90 tablet 0     Multiple Vitamin (MULTI-VITAMIN PO) Take by mouth as needed        Naproxen Sodium (ALEVE PO) Take 220 mg by mouth as needed for moderate pain       sertraline (ZOLOFT) 100 MG tablet Take 1 tablet (100 mg) by mouth daily 90 tablet 0        Reviewed and updated as needed this visit by Provider        Social History     Tobacco Use     Smoking status: Never Smoker     Smokeless tobacco: Never Used   Substance Use Topics     Alcohol use: No     If you drink alcohol do you typically have >3 drinks per day or >7 drinks per week? No    Alcohol Use 9/18/2020   Prescreen: >3 drinks/day or >7 drinks/week? Not Applicable   Prescreen: >3 drinks/day or >7 drinks/week? -   No flowsheet data found.            Current providers sharing in care for this patient include:   Patient Care Team:  Alisia Jordan MD as PCP - General (Internal Medicine)  Alisia Jordan MD as Assigned PCP    The following health maintenance items are reviewed in Epic and correct as of today:  Health Maintenance   Topic Date Due     ADVANCE CARE PLANNING  07/17/2017     OSCAR ASSESSMENT  07/09/2019     PHQ-9  02/29/2020     COLORECTAL  "CANCER SCREENING  05/01/2020     LIPID  08/29/2020     FALL RISK ASSESSMENT  08/29/2020     INFLUENZA VACCINE (1) 09/01/2020     MEDICARE ANNUAL WELLNESS VISIT  08/29/2020     MAMMO SCREENING  09/26/2020     DTAP/TDAP/TD IMMUNIZATION (3 - Td) 01/10/2024     DEXA  Completed     HEPATITIS C SCREENING  Completed     DEPRESSION ACTION PLAN  Completed     PNEUMOCOCCAL IMMUNIZATION 65+ LOW/MEDIUM RISK  Completed     ZOSTER IMMUNIZATION  Completed     IPV IMMUNIZATION  Aged Out     MENINGITIS IMMUNIZATION  Aged Out     HEPATITIS B IMMUNIZATION  Aged Out         Review of Systems   Constitutional: Negative for chills and fever.   HENT: Negative for congestion, ear pain, hearing loss and sore throat.    Eyes: Negative for pain and visual disturbance.   Respiratory: Negative for cough and shortness of breath.    Cardiovascular: Negative for chest pain, palpitations and peripheral edema.   Gastrointestinal: Negative for abdominal pain, constipation, diarrhea, heartburn, hematochezia and nausea.   Breasts:  Negative for tenderness, breast mass and discharge.   Genitourinary: Negative for dysuria, frequency, genital sores, hematuria, pelvic pain, urgency, vaginal bleeding and vaginal discharge.   Musculoskeletal: Negative for arthralgias, joint swelling and myalgias.   Skin: Negative for rash.   Neurological: Negative for dizziness, weakness, headaches and paresthesias.   Psychiatric/Behavioral: Negative for mood changes. The patient is not nervous/anxious.          OBJECTIVE:      Physical Exam      Patient alert, in no acute distress  /60 (BP Location: Left arm, Patient Position: Sitting, Cuff Size: Adult Regular)   Pulse 110   Temp 98.4  F (36.9  C) (Oral)   Resp 18   Ht 1.676 m (5' 6\")   Wt 62.6 kg (138 lb)   SpO2 97%   BMI 22.27 kg/m      HEENT: extraocular movements are intact, pupils equal and reactive to light and accommodation, TMs clear  NECK: Neck supple. No adenopathy. Thyroid symmetric, normal size,, " Carotids without bruits.  PULMONARY: clear to auscultation  CARDIAC: regular rate and rhythm and no murmurs, clicks, or gallops  PULSES: 2/2 throughout  BACK: no spinal or CVAT  ABDOMINAL: Soft, nontender.  Normal bowel sounds.  No hepatosplenomegaly or abnormal masses  BREAST: No breast masses or tenderness, No axillary masses or tenderness and No galactorrhea  REFLEXES: 2+ throughout    PHQ 7/15/2018 8/29/2019 9/30/2020   PHQ-9 Total Score 1 0 0   Q9: Thoughts of better off dead/self-harm past 2 weeks Not at all Not at all Not at all         OSCAR-7 SCORE 2/16/2018 7/15/2018 9/30/2020   Total Score 0 3 0       .    ASSESSMENT / PLAN:   1. Encounter for Medicare annual wellness exam  Up to date    2. Major depressive disorder with single episode, in full remission (H)  Well-controlled, continue medication  - sertraline (ZOLOFT) 100 MG tablet; Take 1 tablet (100 mg) by mouth daily  Dispense: 90 tablet; Refill: 3  - EMOTIONAL / BEHAVIORAL ASSESSMENT    3. Acquired hypothyroidism  Stable clinically but check lab  - levothyroxine (SYNTHROID/LEVOTHROID) 75 MCG tablet; Take 1 tablet (75 mcg) by mouth daily  Dispense: 90 tablet; Refill: 3  - TSH with free T4 reflex    4. Anxiety  Well-controlled, continue medication  - sertraline (ZOLOFT) 100 MG tablet; Take 1 tablet (100 mg) by mouth daily  Dispense: 90 tablet; Refill: 3  - EMOTIONAL / BEHAVIORAL ASSESSMENT    5. Hyperlipidemia LDL goal <130  Recheck lab  - Lipid panel reflex to direct LDL Fasting    6. Right low back pain, unspecified chronicity, unspecified whether sciatica present  Recommend she do her exercises, refer to orthopedics if worsening    7. Screening for diabetes mellitus    - Basic metabolic panel  (Ca, Cl, CO2, Creat, Gluc, K, Na, BUN)    8. Screen for colon cancer    - GASTROENTEROLOGY ADULT REF PROCEDURE ONLY; Future    9. Encounter for screening mammogram for breast cancer    - *MA Screening Digital Bilateral; Future    10. Asymptomatic postmenopausal  "status    - DX Hip/Pelvis/Spine; Future    Patient has been advised of split billing requirements and indicates understanding: Yes  COUNSELING:  Reviewed preventive health counseling, as reflected in patient instructions    Estimated body mass index is 22.39 kg/m  as calculated from the following:    Height as of 8/29/19: 1.657 m (5' 5.25\").    Weight as of 8/29/19: 61.5 kg (135 lb 9.6 oz).        She reports that she has never smoked. She has never used smokeless tobacco.      Appropriate preventive services were discussed with this patient, including applicable screening as appropriate for cardiovascular disease, diabetes, osteopenia/osteoporosis, and glaucoma.  As appropriate for age/gender, discussed screening for colorectal cancer, prostate cancer, breast cancer, and cervical cancer. Checklist reviewing preventive services available has been given to the patient.    Reviewed patients plan of care and provided an AVS. The Basic Care Plan (routine screening as documented in Health Maintenance) for Yessi meets the Care Plan requirement. This Care Plan has been established and reviewed with the Patient.    Counseling Resources:  ATP IV Guidelines  Pooled Cohorts Equation Calculator  Breast Cancer Risk Calculator  Breast Cancer: Medication to Reduce Risk  FRAX Risk Assessment  ICSI Preventive Guidelines  Dietary Guidelines for Americans, 2010  [employer]'s MyPlate  ASA Prophylaxis  Lung CA Screening    Alisia Jordan MD  [facility]    Identified Health Risks:  "

## 2020-09-21 NOTE — PATIENT INSTRUCTIONS
PREVENTIVE HEALTH RECOMMENDATIONS:     Vaccines: Get a flu shot each year. Get a tetanus shot every 10 years.     Exercise for at least 150 minutes a week (an average of 30 minutes a day, 5 days of the week). This will help you control your weight and prevent disease.    Limit alcohol to one drink per day.    No smoking.     Wear sunscreen to prevent skin cancer.     See your dentist twice a year for an exam and cleaning.    Try to get Calcium 1200 mg total per day. It is best to not take it all at once. Try to get Vitamin D at least 1000 units (25 mcg) per day.    BMI or Body Mass Index is a way of indicating weight and health risk for cardiovascular diseases, high blood pressure, diabetes.   Definitions:    Underweight is less than 18.5 and will be associated with health risk.   Normal BMI is 18.5 to 25   Overweight is 25-29   Obesity is 30 or greater   Morbid Obesity is 40 or greater or 35 or greater with diabetes, prediabetes or abnormal blood sugar, high blood pressure or elevated cholesterol  Obesity and Morbid Obesity are associated with higher health risks. Lowering calories, exercising more may lower your BMI and even small decreases can have positive impact on lowering health risks.   Your Body mass index is 22.27 kg/m ..,

## 2020-09-22 LAB
ANION GAP SERPL CALCULATED.3IONS-SCNC: 6 MMOL/L (ref 3–14)
BUN SERPL-MCNC: 25 MG/DL (ref 7–30)
CALCIUM SERPL-MCNC: 9.2 MG/DL (ref 8.5–10.1)
CHLORIDE SERPL-SCNC: 106 MMOL/L (ref 94–109)
CHOLEST SERPL-MCNC: 254 MG/DL
CO2 SERPL-SCNC: 26 MMOL/L (ref 20–32)
CREAT SERPL-MCNC: 1.04 MG/DL (ref 0.52–1.04)
GFR SERPL CREATININE-BSD FRML MDRD: 56 ML/MIN/{1.73_M2}
GLUCOSE SERPL-MCNC: 89 MG/DL (ref 70–99)
HDLC SERPL-MCNC: 69 MG/DL
LDLC SERPL CALC-MCNC: 164 MG/DL
NONHDLC SERPL-MCNC: 185 MG/DL
POTASSIUM SERPL-SCNC: 4.4 MMOL/L (ref 3.4–5.3)
SODIUM SERPL-SCNC: 138 MMOL/L (ref 133–144)
TRIGL SERPL-MCNC: 107 MG/DL
TSH SERPL DL<=0.005 MIU/L-ACNC: 1.39 MU/L (ref 0.4–4)

## 2020-09-30 ASSESSMENT — ANXIETY QUESTIONNAIRES
5. BEING SO RESTLESS THAT IT IS HARD TO SIT STILL: NOT AT ALL
6. BECOMING EASILY ANNOYED OR IRRITABLE: NOT AT ALL
1. FEELING NERVOUS, ANXIOUS, OR ON EDGE: NOT AT ALL
GAD7 TOTAL SCORE: 0
7. FEELING AFRAID AS IF SOMETHING AWFUL MIGHT HAPPEN: NOT AT ALL
3. WORRYING TOO MUCH ABOUT DIFFERENT THINGS: NOT AT ALL
2. NOT BEING ABLE TO STOP OR CONTROL WORRYING: NOT AT ALL

## 2020-09-30 ASSESSMENT — PATIENT HEALTH QUESTIONNAIRE - PHQ9
SUM OF ALL RESPONSES TO PHQ QUESTIONS 1-9: 0
5. POOR APPETITE OR OVEREATING: NOT AT ALL

## 2020-10-01 ASSESSMENT — ANXIETY QUESTIONNAIRES: GAD7 TOTAL SCORE: 0

## 2020-10-27 ENCOUNTER — ANCILLARY PROCEDURE (OUTPATIENT)
Dept: BONE DENSITY | Facility: CLINIC | Age: 65
End: 2020-10-27
Attending: INTERNAL MEDICINE
Payer: COMMERCIAL

## 2020-10-27 ENCOUNTER — HOSPITAL ENCOUNTER (OUTPATIENT)
Dept: MAMMOGRAPHY | Facility: CLINIC | Age: 65
Discharge: HOME | End: 2020-10-27
Attending: INTERNAL MEDICINE | Admitting: INTERNAL MEDICINE
Payer: COMMERCIAL

## 2020-10-27 DIAGNOSIS — Z12.31 ENCOUNTER FOR SCREENING MAMMOGRAM FOR BREAST CANCER: ICD-10-CM

## 2020-10-27 DIAGNOSIS — Z78.0 ASYMPTOMATIC POSTMENOPAUSAL STATUS: ICD-10-CM

## 2020-10-27 PROCEDURE — 77067 SCR MAMMO BI INCL CAD: CPT

## 2020-10-27 PROCEDURE — 77080 DXA BONE DENSITY AXIAL: CPT | Performed by: INTERNAL MEDICINE

## 2021-05-11 ENCOUNTER — TRANSFERRED RECORDS (OUTPATIENT)
Dept: HEALTH INFORMATION MANAGEMENT | Facility: CLINIC | Age: 66
End: 2021-05-11

## 2021-08-18 DIAGNOSIS — F32.5 MAJOR DEPRESSIVE DISORDER WITH SINGLE EPISODE, IN FULL REMISSION (H): ICD-10-CM

## 2021-08-18 DIAGNOSIS — E03.9 ACQUIRED HYPOTHYROIDISM: ICD-10-CM

## 2021-08-18 DIAGNOSIS — F41.9 ANXIETY: ICD-10-CM

## 2021-08-19 RX ORDER — LEVOTHYROXINE SODIUM 75 UG/1
TABLET ORAL
Qty: 90 TABLET | Refills: 0 | Status: SHIPPED | OUTPATIENT
Start: 2021-08-19 | End: 2021-09-23

## 2021-08-19 RX ORDER — SERTRALINE HYDROCHLORIDE 100 MG/1
TABLET, FILM COATED ORAL
Qty: 90 TABLET | Refills: 0 | Status: SHIPPED | OUTPATIENT
Start: 2021-08-19 | End: 2021-09-23

## 2021-08-19 NOTE — TELEPHONE ENCOUNTER
Pending Prescriptions:                       Disp   Refills    levothyroxine (SYNTHROID/LEVOTHROID) 75 MC*90 tab*0        Sig: TAKE 1 TABLET DAILY    sertraline (ZOLOFT) 100 MG tablet [Pharmac*90 tab*0        Sig: TAKE 1 TABLET DAILY    Routing refill request to provider for review/approval because:  Drug interaction warning    Patient has a future appointment scheduled

## 2021-09-19 ASSESSMENT — ENCOUNTER SYMPTOMS
DIZZINESS: 0
PARESTHESIAS: 0
MYALGIAS: 0
DIARRHEA: 0
HEMATURIA: 0
ABDOMINAL PAIN: 0
HEARTBURN: 0
FREQUENCY: 0
ARTHRALGIAS: 0
NAUSEA: 0
PALPITATIONS: 0
HEMATOCHEZIA: 0
EYE PAIN: 0
NERVOUS/ANXIOUS: 0
CONSTIPATION: 0
JOINT SWELLING: 0
BREAST MASS: 0
FEVER: 0
DYSURIA: 0
HEADACHES: 0
SORE THROAT: 0
CHILLS: 0
COUGH: 0
WEAKNESS: 0
SHORTNESS OF BREATH: 0

## 2021-09-19 ASSESSMENT — ACTIVITIES OF DAILY LIVING (ADL): CURRENT_FUNCTION: NO ASSISTANCE NEEDED

## 2021-09-23 ENCOUNTER — OFFICE VISIT (OUTPATIENT)
Dept: INTERNAL MEDICINE | Facility: CLINIC | Age: 66
End: 2021-09-23
Payer: COMMERCIAL

## 2021-09-23 VITALS
DIASTOLIC BLOOD PRESSURE: 71 MMHG | SYSTOLIC BLOOD PRESSURE: 126 MMHG | WEIGHT: 138.2 LBS | OXYGEN SATURATION: 99 % | HEIGHT: 66 IN | RESPIRATION RATE: 18 BRPM | HEART RATE: 116 BPM | BODY MASS INDEX: 22.21 KG/M2 | TEMPERATURE: 98.2 F

## 2021-09-23 DIAGNOSIS — Z00.00 ENCOUNTER FOR ANNUAL WELLNESS EXAM IN MEDICARE PATIENT: Primary | ICD-10-CM

## 2021-09-23 DIAGNOSIS — E78.5 HYPERLIPIDEMIA LDL GOAL <130: ICD-10-CM

## 2021-09-23 DIAGNOSIS — F41.9 ANXIETY: ICD-10-CM

## 2021-09-23 DIAGNOSIS — E03.9 ACQUIRED HYPOTHYROIDISM: ICD-10-CM

## 2021-09-23 DIAGNOSIS — Z13.1 SCREENING FOR DIABETES MELLITUS: ICD-10-CM

## 2021-09-23 DIAGNOSIS — F32.5 MAJOR DEPRESSIVE DISORDER WITH SINGLE EPISODE, IN FULL REMISSION (H): ICD-10-CM

## 2021-09-23 PROCEDURE — 80048 BASIC METABOLIC PNL TOTAL CA: CPT | Performed by: INTERNAL MEDICINE

## 2021-09-23 PROCEDURE — 99214 OFFICE O/P EST MOD 30 MIN: CPT | Mod: 25 | Performed by: INTERNAL MEDICINE

## 2021-09-23 PROCEDURE — G0438 PPPS, INITIAL VISIT: HCPCS | Performed by: INTERNAL MEDICINE

## 2021-09-23 PROCEDURE — 80061 LIPID PANEL: CPT | Performed by: INTERNAL MEDICINE

## 2021-09-23 PROCEDURE — 90662 IIV NO PRSV INCREASED AG IM: CPT | Performed by: INTERNAL MEDICINE

## 2021-09-23 PROCEDURE — 36415 COLL VENOUS BLD VENIPUNCTURE: CPT | Performed by: INTERNAL MEDICINE

## 2021-09-23 PROCEDURE — 84443 ASSAY THYROID STIM HORMONE: CPT | Performed by: INTERNAL MEDICINE

## 2021-09-23 PROCEDURE — G0008 ADMIN INFLUENZA VIRUS VAC: HCPCS | Performed by: INTERNAL MEDICINE

## 2021-09-23 RX ORDER — LEVOTHYROXINE SODIUM 75 UG/1
75 TABLET ORAL DAILY
Qty: 90 TABLET | Refills: 3 | Status: SHIPPED | OUTPATIENT
Start: 2021-09-23 | End: 2022-10-28

## 2021-09-23 RX ORDER — SERTRALINE HYDROCHLORIDE 100 MG/1
100 TABLET, FILM COATED ORAL DAILY
Qty: 90 TABLET | Refills: 3 | Status: SHIPPED | OUTPATIENT
Start: 2021-09-23 | End: 2022-10-28

## 2021-09-23 RX ORDER — FAMOTIDINE 10 MG
10 TABLET ORAL DAILY
COMMUNITY

## 2021-09-23 ASSESSMENT — ENCOUNTER SYMPTOMS
DYSURIA: 0
DIARRHEA: 0
HEMATOCHEZIA: 0
SORE THROAT: 0
HEMATURIA: 0
WEAKNESS: 0
FREQUENCY: 0
PALPITATIONS: 0
DIZZINESS: 0
BREAST MASS: 0
COUGH: 0
MYALGIAS: 0
ABDOMINAL PAIN: 0
EYE PAIN: 0
NERVOUS/ANXIOUS: 0
JOINT SWELLING: 0
PARESTHESIAS: 0
HEARTBURN: 0
CHILLS: 0
HEADACHES: 0
CONSTIPATION: 0
FEVER: 0
SHORTNESS OF BREATH: 0
NAUSEA: 0
ARTHRALGIAS: 0

## 2021-09-23 ASSESSMENT — ANXIETY QUESTIONNAIRES
3. WORRYING TOO MUCH ABOUT DIFFERENT THINGS: NOT AT ALL
1. FEELING NERVOUS, ANXIOUS, OR ON EDGE: NOT AT ALL
IF YOU CHECKED OFF ANY PROBLEMS ON THIS QUESTIONNAIRE, HOW DIFFICULT HAVE THESE PROBLEMS MADE IT FOR YOU TO DO YOUR WORK, TAKE CARE OF THINGS AT HOME, OR GET ALONG WITH OTHER PEOPLE: NOT DIFFICULT AT ALL
7. FEELING AFRAID AS IF SOMETHING AWFUL MIGHT HAPPEN: NOT AT ALL
5. BEING SO RESTLESS THAT IT IS HARD TO SIT STILL: NOT AT ALL
6. BECOMING EASILY ANNOYED OR IRRITABLE: NOT AT ALL
GAD7 TOTAL SCORE: 0
2. NOT BEING ABLE TO STOP OR CONTROL WORRYING: NOT AT ALL

## 2021-09-23 ASSESSMENT — PATIENT HEALTH QUESTIONNAIRE - PHQ9
5. POOR APPETITE OR OVEREATING: NOT AT ALL
SUM OF ALL RESPONSES TO PHQ QUESTIONS 1-9: 0

## 2021-09-23 ASSESSMENT — MIFFLIN-ST. JEOR: SCORE: 1173.62

## 2021-09-23 ASSESSMENT — ACTIVITIES OF DAILY LIVING (ADL): CURRENT_FUNCTION: NO ASSISTANCE NEEDED

## 2021-09-23 NOTE — PROGRESS NOTES
"SUBJECTIVE:   Yessi Claire is a 66 year old female who presents for Preventive Visit.      Patient has been advised of split billing requirements and indicates understanding: Yes   Are you in the first 12 months of your Medicare coverage?  No    Healthy Habits:     In general, how would you rate your overall health?  Excellent    Frequency of exercise:  4-5 days/week    Duration of exercise:  30-45 minutes    Do you usually eat at least 4 servings of fruit and vegetables a day, include whole grains    & fiber and avoid regularly eating high fat or \"junk\" foods?  Yes    Taking medications regularly:  No    Medication side effects:  None    Ability to successfully perform activities of daily living:  No assistance needed    Home Safety:  No safety concerns identified    Hearing Impairment:  No hearing concerns    In the past 6 months, have you been bothered by leaking of urine?  No    In general, how would you rate your overall mental or emotional health?  Excellent      PHQ-2 Total Score: 0    Additional concerns today:  No    Problems:   1.  Hypothyroidism: clinically stable  2.  Depression and anxiety: still well controlled  3.  Hyperlipidemia: diet stable     Do you feel safe in your environment? Yes    Have you ever done Advance Care Planning? (For example, a Health Directive, POLST, or a discussion with a medical provider or your loved ones about your wishes): No, advance care planning information given to patient to review.  Patient plans to discuss their wishes with loved ones or provider.       Fall risk  Fallen 2 or more times in the past year?: No  Any fall with injury in the past year?: No    Cognitive Screening   1) Repeat 3 items (Leader, Season, Table)    2) Clock draw: NORMAL  3) 3 item recall: Recalls 3 objects  Results: 3 items recalled: COGNITIVE IMPAIRMENT LESS LIKELY    Mini-CogTM Copyright CARMINA Stahl. Licensed by the author for use in [facility]; reprinted with permission " ([email or web address]). All rights reserved.      Do you have sleep apnea, excessive snoring or daytime drowsiness?: no    Reviewed and updated as needed this visit by clinical staff                 Patient Active Problem List   Diagnosis     Acquired hypothyroidism     Advanced directives, counseling/discussion     Major depression     Low back pain     Hashimoto's thyroiditis     Hyperlipidemia LDL goal <130     Anxiety         Current Outpatient Medications   Medication Sig Dispense Refill     ASPIRIN 81 MG OR TABS 1 tablet 3 times a week       CALCIUM-VITAMIN D PO Take by mouth daily       famotidine (PEPCID) 10 MG tablet Take 10 mg by mouth daily       levothyroxine (SYNTHROID/LEVOTHROID) 75 MCG tablet Take 1 tablet (75 mcg) by mouth daily 90 tablet 3     Multiple Vitamin (MULTI-VITAMIN PO) Take by mouth as needed        Naproxen Sodium (ALEVE PO) Take 220 mg by mouth as needed for moderate pain       sertraline (ZOLOFT) 100 MG tablet Take 1 tablet (100 mg) by mouth daily 90 tablet 3      Reviewed and updated as needed this visit by Provider                Social History     Tobacco Use     Smoking status: Never Smoker     Smokeless tobacco: Never Used   Substance Use Topics     Alcohol use: No     If you drink alcohol do you typically have >3 drinks per day or >7 drinks per week? No    Alcohol Use 9/19/2021   Prescreen: >3 drinks/day or >7 drinks/week? Not Applicable   Prescreen: >3 drinks/day or >7 drinks/week? -   No flowsheet data found.    Current providers sharing in care for this patient include:   Patient Care Team:  Alisia Jordan MD as PCP - General (Internal Medicine)  Alisia Jordan MD as Assigned PCP    The following health maintenance items are reviewed in Epic and correct as of today:  Health Maintenance Due   Topic Date Due     ANNUAL REVIEW OF HM ORDERS  Never done     PHQ-9  03/21/2021     INFLUENZA VACCINE (1) 09/01/2021     LIPID  09/21/2021     FALL RISK ASSESSMENT  09/21/2021     MEDICARE ANNUAL  "WELLNESS VISIT  09/21/2021     OSCAR ASSESSMENT  09/21/2021         Any new diagnosis of family breast, ovarian, or bowel cancer?     FHS-7: No flowsheet data found.  click delete button to remove this line now    Pertinent mammograms are reviewed under the imaging tab.    Review of Systems   Constitutional: Negative for chills and fever.   HENT: Negative for congestion, ear pain, hearing loss and sore throat.    Eyes: Negative for pain and visual disturbance.   Respiratory: Negative for cough and shortness of breath.    Cardiovascular: Negative for chest pain, palpitations and peripheral edema.   Gastrointestinal: Negative for abdominal pain, constipation, diarrhea, heartburn, hematochezia and nausea.   Breasts:  Negative for tenderness, breast mass and discharge.   Genitourinary: Negative for dysuria, frequency, genital sores, hematuria, pelvic pain, urgency, vaginal bleeding and vaginal discharge.   Musculoskeletal: Negative for arthralgias, joint swelling and myalgias.   Skin: Negative for rash.   Neurological: Negative for dizziness, weakness, headaches and paresthesias.   Psychiatric/Behavioral: Negative for mood changes. The patient is not nervous/anxious.          OBJECTIVE:   /71 (BP Location: Left arm, Patient Position: Sitting, Cuff Size: Adult Regular)   Pulse 116   Temp 98.2  F (36.8  C) (Oral)   Resp 18   Ht 1.676 m (5' 6\")   Wt 62.7 kg (138 lb 3.2 oz)   SpO2 99%   BMI 22.31 kg/m   Estimated body mass index is 22.27 kg/m  as calculated from the following:    Height as of 9/21/20: 1.676 m (5' 6\").    Weight as of 9/21/20: 62.6 kg (138 lb).  Physical Exam    HEENT: extraocular movements are intact, pupils equal and reactive to light and accommodation, TMs clear  NECK: Neck supple. No adenopathy. Thyroid symmetric, normal size,, Carotids without bruits.  PULMONARY: clear to auscultation  CARDIAC: regular rate and rhythm and no murmurs, clicks, or gallops  PULSES: 2/2 throughout  BACK: no spinal " "or CVAT  ABDOMINAL: Soft, nontender.  Normal bowel sounds.  No hepatosplenomegaly or abnormal masses  BREAST: No breast masses or tenderness, No axillary masses or tenderness and No galactorrhea  REFLEXES: 2+ throughout    PHQ 8/29/2019 9/30/2020 9/23/2021   PHQ-9 Total Score 0 0 0   Q9: Thoughts of better off dead/self-harm past 2 weeks Not at all Not at all Not at all     OSCAR-7 SCORE 7/15/2018 9/30/2020 9/23/2021   Total Score 3 0 0           ASSESSMENT / PLAN:   (Z00.00) Encounter for annual wellness exam in Medicare patient  (primary encounter diagnosis)  Comment: up to date  Plan:     (F32.5) Major depressive disorder with single episode, in full remission (H)  Comment: well controlled, continue med  Plan: sertraline (ZOLOFT) 100 MG tablet, EMOTIONAL /         BEHAVIORAL ASSESSMENT            (E03.9) Acquired hypothyroidism  Comment: stable clinically, check lab   Plan: levothyroxine (SYNTHROID/LEVOTHROID) 75 MCG         tablet, TSH with free T4 reflex            (E78.5) Hyperlipidemia LDL goal <130  Comment: stable diet, recheck  Plan: Lipid panel reflex to direct LDL Fasting           (F41.9) Anxiety  Comment: well controlled  Plan: sertraline (ZOLOFT) 100 MG tablet, EMOTIONAL /         BEHAVIORAL ASSESSMENT        Continue med     (Z13.1) Screening for diabetes mellitus  Comment:   Plan: Basic metabolic panel  (Ca, Cl, CO2, Creat,         Gluc, K, Na, BUN)              Patient has been advised of split billing requirements and indicates understanding: Yes  COUNSELING:  Reviewed preventive health counseling, as reflected in patient instructions    Estimated body mass index is 22.27 kg/m  as calculated from the following:    Height as of 9/21/20: 1.676 m (5' 6\").    Weight as of 9/21/20: 62.6 kg (138 lb).        She reports that she has never smoked. She has never used smokeless tobacco.      Appropriate preventive services were discussed with this patient, including applicable screening as appropriate for " cardiovascular disease, diabetes, osteopenia/osteoporosis, and glaucoma.  As appropriate for age/gender, discussed screening for colorectal cancer, prostate cancer, breast cancer, and cervical cancer. Checklist reviewing preventive services available has been given to the patient.    Reviewed patients plan of care and provided an AVS. The Basic Care Plan (routine screening as documented in Health Maintenance) and Intermediate Care Plan ( asthma action plan, low back pain action plan, and migraine action plan) for Yessi meets the Care Plan requirement. This Care Plan has been established and reviewed with the Patient.    Counseling Resources:  ATP IV Guidelines  Pooled Cohorts Equation Calculator  Breast Cancer Risk Calculator  Breast Cancer: Medication to Reduce Risk  FRAX Risk Assessment  ICSI Preventive Guidelines  Dietary Guidelines for Americans, 2010  [employer]'s MyPlate  ASA Prophylaxis  Lung CA Screening    Alisia Jordan MD  [facility]    Identified Health Risks:

## 2021-09-23 NOTE — NURSING NOTE
"/71 (BP Location: Left arm, Patient Position: Sitting, Cuff Size: Adult Regular)   Pulse 116   Temp 98.2  F (36.8  C) (Oral)   Resp 18   Ht 1.676 m (5' 6\")   Wt 62.7 kg (138 lb 3.2 oz)   SpO2 99%   BMI 22.31 kg/m      "

## 2021-09-24 LAB
ANION GAP SERPL CALCULATED.3IONS-SCNC: 3 MMOL/L (ref 3–14)
BUN SERPL-MCNC: 21 MG/DL (ref 7–30)
CALCIUM SERPL-MCNC: 8.8 MG/DL (ref 8.5–10.1)
CHLORIDE BLD-SCNC: 108 MMOL/L (ref 94–109)
CHOLEST SERPL-MCNC: 264 MG/DL
CO2 SERPL-SCNC: 27 MMOL/L (ref 20–32)
CREAT SERPL-MCNC: 0.96 MG/DL (ref 0.52–1.04)
FASTING STATUS PATIENT QL REPORTED: YES
GFR SERPL CREATININE-BSD FRML MDRD: 61 ML/MIN/1.73M2
GLUCOSE BLD-MCNC: 97 MG/DL (ref 70–99)
HDLC SERPL-MCNC: 62 MG/DL
LDLC SERPL CALC-MCNC: 171 MG/DL
NONHDLC SERPL-MCNC: 202 MG/DL
POTASSIUM BLD-SCNC: 4.6 MMOL/L (ref 3.4–5.3)
SODIUM SERPL-SCNC: 138 MMOL/L (ref 133–144)
TRIGL SERPL-MCNC: 154 MG/DL
TSH SERPL DL<=0.005 MIU/L-ACNC: 1.47 MU/L (ref 0.4–4)

## 2021-09-24 ASSESSMENT — ANXIETY QUESTIONNAIRES: GAD7 TOTAL SCORE: 0

## 2021-10-19 ENCOUNTER — MYC MEDICAL ADVICE (OUTPATIENT)
Dept: INTERNAL MEDICINE | Facility: CLINIC | Age: 66
End: 2021-10-19

## 2021-10-19 DIAGNOSIS — E78.5 HYPERLIPIDEMIA LDL GOAL <130: Primary | ICD-10-CM

## 2021-10-19 NOTE — TELEPHONE ENCOUNTER
See [employer] message.   Please review and advise.     Recent Labs   Lab Test 09/23/21  1025 09/21/20  1017 07/06/16  0850 06/30/15  0915   CHOL 264* 254*   < > 200*   HDL 62 69   < > 42*   * 164*   < > 126   TRIG 154* 107   < > 161*   CHOLHDLRATIO  --   --   --  4.8    < > = values in this interval not displayed.

## 2021-10-21 RX ORDER — ROSUVASTATIN CALCIUM 10 MG/1
10 TABLET, COATED ORAL DAILY
Qty: 90 TABLET | Refills: 1 | Status: SHIPPED | OUTPATIENT
Start: 2021-10-21 | End: 2022-04-12

## 2022-01-10 ENCOUNTER — LAB (OUTPATIENT)
Dept: LAB | Facility: CLINIC | Age: 67
End: 2022-01-10
Payer: COMMERCIAL

## 2022-01-10 DIAGNOSIS — E78.5 HYPERLIPIDEMIA LDL GOAL <130: ICD-10-CM

## 2022-01-10 LAB
ALBUMIN SERPL-MCNC: 3.8 G/DL (ref 3.4–5)
ALP SERPL-CCNC: 55 U/L (ref 40–150)
ALT SERPL W P-5'-P-CCNC: 25 U/L (ref 0–50)
AST SERPL W P-5'-P-CCNC: 28 U/L (ref 0–45)
BILIRUB DIRECT SERPL-MCNC: 0.1 MG/DL (ref 0–0.2)
BILIRUB SERPL-MCNC: 0.4 MG/DL (ref 0.2–1.3)
CHOLEST SERPL-MCNC: 181 MG/DL
FASTING STATUS PATIENT QL REPORTED: YES
HDLC SERPL-MCNC: 75 MG/DL
LDLC SERPL CALC-MCNC: 86 MG/DL
NONHDLC SERPL-MCNC: 106 MG/DL
PROT SERPL-MCNC: 7.2 G/DL (ref 6.8–8.8)
TRIGL SERPL-MCNC: 102 MG/DL

## 2022-01-10 PROCEDURE — 36415 COLL VENOUS BLD VENIPUNCTURE: CPT

## 2022-01-10 PROCEDURE — 80061 LIPID PANEL: CPT

## 2022-01-10 PROCEDURE — 80076 HEPATIC FUNCTION PANEL: CPT

## 2022-04-11 DIAGNOSIS — E78.5 HYPERLIPIDEMIA LDL GOAL <130: ICD-10-CM

## 2022-04-12 RX ORDER — ROSUVASTATIN CALCIUM 10 MG/1
TABLET, COATED ORAL
Qty: 90 TABLET | Refills: 1 | Status: SHIPPED | OUTPATIENT
Start: 2022-04-12 | End: 2022-10-04

## 2022-04-12 NOTE — TELEPHONE ENCOUNTER
Pending Prescriptions:                       Disp   Refills    rosuvastatin (CRESTOR) 10 MG tablet [Phar*90 tab*3            Sig: TAKE 1 TABLET DAILY    Prescription approved per [facility] Refill Protocol.      Josi TEJADA RN   [facility]

## 2022-10-27 DIAGNOSIS — F41.9 ANXIETY: ICD-10-CM

## 2022-10-27 DIAGNOSIS — E03.9 ACQUIRED HYPOTHYROIDISM: ICD-10-CM

## 2022-10-27 DIAGNOSIS — F32.5 MAJOR DEPRESSIVE DISORDER WITH SINGLE EPISODE, IN FULL REMISSION (H): ICD-10-CM

## 2022-10-28 RX ORDER — SERTRALINE HYDROCHLORIDE 100 MG/1
TABLET, FILM COATED ORAL
Qty: 90 TABLET | Refills: 3 | Status: SHIPPED | OUTPATIENT
Start: 2022-10-28 | End: 2023-10-25

## 2022-10-28 RX ORDER — LEVOTHYROXINE SODIUM 75 UG/1
TABLET ORAL
Qty: 90 TABLET | Refills: 0 | Status: SHIPPED | OUTPATIENT
Start: 2022-10-28 | End: 2022-11-18

## 2022-11-15 ASSESSMENT — ENCOUNTER SYMPTOMS
COUGH: 0
MYALGIAS: 0
WEAKNESS: 0
FREQUENCY: 0
EYE PAIN: 0
HEADACHES: 0
DIZZINESS: 0
PARESTHESIAS: 0
FEVER: 0
DIARRHEA: 0
NERVOUS/ANXIOUS: 0
DYSURIA: 0
BREAST MASS: 0
HEARTBURN: 0
HEMATURIA: 0
HEMATOCHEZIA: 0
NAUSEA: 0
SORE THROAT: 0
PALPITATIONS: 0
ABDOMINAL PAIN: 0
CONSTIPATION: 0
SHORTNESS OF BREATH: 0
ARTHRALGIAS: 1
CHILLS: 0
JOINT SWELLING: 0

## 2022-11-15 ASSESSMENT — ACTIVITIES OF DAILY LIVING (ADL): CURRENT_FUNCTION: NO ASSISTANCE NEEDED

## 2022-11-18 ENCOUNTER — OFFICE VISIT (OUTPATIENT)
Dept: INTERNAL MEDICINE | Facility: CLINIC | Age: 67
End: 2022-11-18
Payer: COMMERCIAL

## 2022-11-18 VITALS
DIASTOLIC BLOOD PRESSURE: 68 MMHG | RESPIRATION RATE: 18 BRPM | TEMPERATURE: 97.7 F | WEIGHT: 139 LBS | HEIGHT: 66 IN | BODY MASS INDEX: 22.34 KG/M2 | OXYGEN SATURATION: 99 % | HEART RATE: 108 BPM | SYSTOLIC BLOOD PRESSURE: 122 MMHG

## 2022-11-18 DIAGNOSIS — Z12.31 ENCOUNTER FOR SCREENING MAMMOGRAM FOR BREAST CANCER: ICD-10-CM

## 2022-11-18 DIAGNOSIS — E03.9 ACQUIRED HYPOTHYROIDISM: ICD-10-CM

## 2022-11-18 DIAGNOSIS — F41.9 ANXIETY: ICD-10-CM

## 2022-11-18 DIAGNOSIS — M77.12 LEFT LATERAL EPICONDYLITIS: ICD-10-CM

## 2022-11-18 DIAGNOSIS — Z13.1 SCREENING FOR DIABETES MELLITUS: ICD-10-CM

## 2022-11-18 DIAGNOSIS — Z00.00 ENCOUNTER FOR ANNUAL WELLNESS EXAM IN MEDICARE PATIENT: Primary | ICD-10-CM

## 2022-11-18 DIAGNOSIS — F32.5 MAJOR DEPRESSIVE DISORDER WITH SINGLE EPISODE, IN FULL REMISSION (H): ICD-10-CM

## 2022-11-18 DIAGNOSIS — R79.9 ABNORMAL BLOOD CHEMISTRY: ICD-10-CM

## 2022-11-18 DIAGNOSIS — E78.5 HYPERLIPIDEMIA LDL GOAL <130: ICD-10-CM

## 2022-11-18 LAB
ANION GAP SERPL CALCULATED.3IONS-SCNC: 14 MMOL/L (ref 7–15)
BUN SERPL-MCNC: 24.3 MG/DL (ref 8–23)
CALCIUM SERPL-MCNC: 9.6 MG/DL (ref 8.8–10.2)
CHLORIDE SERPL-SCNC: 102 MMOL/L (ref 98–107)
CHOLEST SERPL-MCNC: 184 MG/DL
CREAT SERPL-MCNC: 1.02 MG/DL (ref 0.51–0.95)
DEPRECATED HCO3 PLAS-SCNC: 25 MMOL/L (ref 22–29)
GFR SERPL CREATININE-BSD FRML MDRD: 59 ML/MIN/1.73M2
GLUCOSE SERPL-MCNC: 96 MG/DL (ref 70–99)
HDLC SERPL-MCNC: 67 MG/DL
LDLC SERPL CALC-MCNC: 97 MG/DL
NONHDLC SERPL-MCNC: 117 MG/DL
POTASSIUM SERPL-SCNC: 4.7 MMOL/L (ref 3.4–5.3)
SODIUM SERPL-SCNC: 141 MMOL/L (ref 136–145)
TRIGL SERPL-MCNC: 101 MG/DL

## 2022-11-18 PROCEDURE — 36415 COLL VENOUS BLD VENIPUNCTURE: CPT | Performed by: INTERNAL MEDICINE

## 2022-11-18 PROCEDURE — 80061 LIPID PANEL: CPT | Performed by: INTERNAL MEDICINE

## 2022-11-18 PROCEDURE — G0439 PPPS, SUBSEQ VISIT: HCPCS | Performed by: INTERNAL MEDICINE

## 2022-11-18 PROCEDURE — 99214 OFFICE O/P EST MOD 30 MIN: CPT | Mod: 25 | Performed by: INTERNAL MEDICINE

## 2022-11-18 PROCEDURE — 84443 ASSAY THYROID STIM HORMONE: CPT | Performed by: INTERNAL MEDICINE

## 2022-11-18 PROCEDURE — 80048 BASIC METABOLIC PNL TOTAL CA: CPT | Performed by: INTERNAL MEDICINE

## 2022-11-18 RX ORDER — LEVOTHYROXINE SODIUM 75 UG/1
75 TABLET ORAL DAILY
Qty: 90 TABLET | Refills: 3 | Status: SHIPPED | OUTPATIENT
Start: 2022-11-18 | End: 2023-11-22

## 2022-11-18 RX ORDER — ROSUVASTATIN CALCIUM 10 MG/1
10 TABLET, COATED ORAL DAILY
Qty: 90 TABLET | Refills: 3 | Status: SHIPPED | OUTPATIENT
Start: 2022-11-18 | End: 2023-11-22

## 2022-11-18 ASSESSMENT — ENCOUNTER SYMPTOMS
HEMATURIA: 0
HEARTBURN: 0
WEAKNESS: 0
JOINT SWELLING: 0
DIZZINESS: 0
CONSTIPATION: 0
MYALGIAS: 0
CHILLS: 0
SHORTNESS OF BREATH: 0
FEVER: 0
HEADACHES: 0
PALPITATIONS: 0
SORE THROAT: 0
NERVOUS/ANXIOUS: 0
ARTHRALGIAS: 1
BREAST MASS: 0
NAUSEA: 0
DYSURIA: 0
EYE PAIN: 0
COUGH: 0
ABDOMINAL PAIN: 0
PARESTHESIAS: 0
HEMATOCHEZIA: 0
DIARRHEA: 0
FREQUENCY: 0

## 2022-11-18 ASSESSMENT — ANXIETY QUESTIONNAIRES
6. BECOMING EASILY ANNOYED OR IRRITABLE: NOT AT ALL
3. WORRYING TOO MUCH ABOUT DIFFERENT THINGS: NOT AT ALL
5. BEING SO RESTLESS THAT IT IS HARD TO SIT STILL: NOT AT ALL
1. FEELING NERVOUS, ANXIOUS, OR ON EDGE: NOT AT ALL
2. NOT BEING ABLE TO STOP OR CONTROL WORRYING: NOT AT ALL
7. FEELING AFRAID AS IF SOMETHING AWFUL MIGHT HAPPEN: NOT AT ALL
GAD7 TOTAL SCORE: 0
7. FEELING AFRAID AS IF SOMETHING AWFUL MIGHT HAPPEN: NOT AT ALL
IF YOU CHECKED OFF ANY PROBLEMS ON THIS QUESTIONNAIRE, HOW DIFFICULT HAVE THESE PROBLEMS MADE IT FOR YOU TO DO YOUR WORK, TAKE CARE OF THINGS AT HOME, OR GET ALONG WITH OTHER PEOPLE: NOT DIFFICULT AT ALL
GAD7 TOTAL SCORE: 0
GAD7 TOTAL SCORE: 0
4. TROUBLE RELAXING: NOT AT ALL
8. IF YOU CHECKED OFF ANY PROBLEMS, HOW DIFFICULT HAVE THESE MADE IT FOR YOU TO DO YOUR WORK, TAKE CARE OF THINGS AT HOME, OR GET ALONG WITH OTHER PEOPLE?: NOT DIFFICULT AT ALL

## 2022-11-18 ASSESSMENT — ACTIVITIES OF DAILY LIVING (ADL): CURRENT_FUNCTION: NO ASSISTANCE NEEDED

## 2022-11-18 NOTE — PROGRESS NOTES
"SUBJECTIVE:   Whitney is a 67 year old who presents for Preventive Visit.  Patient has been advised of split billing requirements and indicates understanding: Yes  Are you in the first 12 months of your Medicare coverage?  No    Healthy Habits:     In general, how would you rate your overall health?  Excellent    Frequency of exercise:  6-7 days/week    Duration of exercise:  30-45 minutes    Do you usually eat at least 4 servings of fruit and vegetables a day, include whole grains    & fiber and avoid regularly eating high fat or \"junk\" foods?  Yes    Taking medications regularly:  Yes    Medication side effects:  None    Ability to successfully perform activities of daily living:  No assistance needed    Home Safety:  No safety concerns identified    Hearing Impairment:  No hearing concerns    In the past 6 months, have you been bothered by leaking of urine?  No    In general, how would you rate your overall mental or emotional health?  Excellent      PHQ-2 Total Score: 0    Additional concerns today:  No    Problems:  1.  Major depression and anxiety: She continues to have full remission.  She wants to continue with her medication.  2.  Hypothyroidism: Stable clinically  3.  Hyperlipidemia: Stable on medication, diet stable.    Current concerns:  She has been having some pain in her left elbow: This is been for few weeks.  It started with significant tenderness at the lateral epicondyle, has some aching, it hurts just above and below, particularly with use.  Icing has helped it.  She has not tried anything else.      Patient Active Problem List   Diagnosis     Acquired hypothyroidism     Advanced directives, counseling/discussion     Major depressive disorder with single episode, in full remission (H)     Low back pain     Hashimoto's thyroiditis     Hyperlipidemia LDL goal <130     Anxiety     Current Outpatient Medications   Medication Sig Dispense Refill     ASPIRIN 81 MG OR TABS 1 tablet 3 times a week       " CALCIUM-VITAMIN D PO Take by mouth daily       famotidine (PEPCID) 10 MG tablet Take 10 mg by mouth daily       levothyroxine (SYNTHROID/LEVOTHROID) 75 MCG tablet Take 1 tablet (75 mcg) by mouth daily 90 tablet 3     Multiple Vitamin (MULTI-VITAMIN PO) Take by mouth as needed        Naproxen Sodium (ALEVE PO) Take 220 mg by mouth as needed for moderate pain       rosuvastatin (CRESTOR) 10 MG tablet Take 1 tablet (10 mg) by mouth daily 90 tablet 3     sertraline (ZOLOFT) 100 MG tablet TAKE 1 TABLET DAILY 90 tablet 3            Have you ever done Advance Care Planning? (For example, a Health Directive, POLST, or a discussion with a medical provider or your loved ones about your wishes):        Fall risk  Fallen 2 or more times in the past year?: No  Any fall with injury in the past year?: No    Cognitive Screening   1) Repeat 3 items (Leader, Season, Table)      2) Clock draw:   NORMAL  3) 3 item recall:   Recalls 3 objects  Results: 3 items recalled: COGNITIVE IMPAIRMENT LESS LIKELY    Mini-CogTM Copyright S Favio. Licensed by the author for use in [facility]; reprinted with permission (moni@[facility]). All rights reserved.      Do you have sleep apnea, excessive snoring or daytime drowsiness?: no    Reviewed and updated as needed this visit by clinical staff   Tobacco  Allergies  Meds              Reviewed and updated as needed this visit by Provider                 Social History     Tobacco Use     Smoking status: Never     Smokeless tobacco: Never   Substance Use Topics     Alcohol use: No     If you drink alcohol do you typically have >3 drinks per day or >7 drinks per week? No    Alcohol Use 11/15/2022   Prescreen: >3 drinks/day or >7 drinks/week? Not Applicable   Prescreen: >3 drinks/day or >7 drinks/week? -         Hyperlipidemia Follow-Up      Are you regularly taking any medication or supplement to lower your cholesterol?   Yes- Crestor     Are you having muscle aches or other side effects  that you think could be caused by your cholesterol lowering medication?  No    Hypothyroidism Follow-up      Since last visit, patient describes the following symptoms: Weight stable, no hair loss, no skin changes, no constipation, no loose stools      Current providers sharing in care for this patient include:   Patient Care Team:  Alisia Jordan MD as PCP - General (Internal Medicine)  Alisia Jordan MD as Assigned PCP    The following health maintenance items are reviewed in Epic and correct as of today:  Health Maintenance   Topic Date Due     MEDICARE ANNUAL WELLNESS VISIT  09/23/2022     ANNUAL REVIEW OF HM ORDERS  09/23/2022     MAMMO SCREENING  10/27/2022     LIPID  01/10/2023     PHQ-9  05/18/2023     OSCAR ASSESSMENT  11/18/2023     FALL RISK ASSESSMENT  11/18/2023     DTAP/TDAP/TD IMMUNIZATION (4 - Td or Tdap) 02/28/2024     ADVANCE CARE PLANNING  10/02/2026     DEXA  10/27/2030     COLORECTAL CANCER SCREENING  05/11/2031     HEPATITIS C SCREENING  Completed     DEPRESSION ACTION PLAN  Completed     INFLUENZA VACCINE  Completed     Pneumococcal Vaccine: 65+ Years  Completed     ZOSTER IMMUNIZATION  Completed     COVID-19 Vaccine  Completed     IPV IMMUNIZATION  Aged Out     MENINGITIS IMMUNIZATION  Aged Out           Breast CA Risk Assessment (FHS-7) 11/15/2022   Do you have a family history of breast, colon, or ovarian cancer? No / Unknown         Mammogram Screening: Recommended mammography every 1-2 years with patient discussion and risk factor consideration  Pertinent mammograms are reviewed under the imaging tab.    Review of Systems   Constitutional: Negative for chills and fever.   HENT: Negative for congestion, ear pain, hearing loss and sore throat.    Eyes: Negative for pain and visual disturbance.   Respiratory: Negative for cough and shortness of breath.    Cardiovascular: Negative for chest pain, palpitations and peripheral edema.   Gastrointestinal: Negative for abdominal pain, constipation,  "diarrhea, heartburn, hematochezia and nausea.   Breasts:  Negative for tenderness, breast mass and discharge.   Genitourinary: Negative for dysuria, frequency, genital sores, hematuria, pelvic pain, urgency, vaginal bleeding and vaginal discharge.   Musculoskeletal: Positive for arthralgias. Negative for joint swelling and myalgias.   Skin: Negative for rash.   Neurological: Negative for dizziness, weakness, headaches and paresthesias.   Psychiatric/Behavioral: Negative for mood changes. The patient is not nervous/anxious.        OBJECTIVE:   /68   Pulse 108   Temp 97.7  F (36.5  C) (Tympanic)   Resp 18   Ht 1.676 m (5' 6\")   Wt 63 kg (139 lb)   SpO2 99%   BMI 22.44 kg/m   Estimated body mass index is 22.44 kg/m  as calculated from the following:    Height as of this encounter: 1.676 m (5' 6\").    Weight as of this encounter: 63 kg (139 lb).  Physical Exam      HEENT: extraocular movements are intact, pupils equal and reactive to light and accommodation, TMs clear  NECK: Neck supple. No adenopathy. Thyroid symmetric, normal size,, Carotids without bruits.  PULMONARY: clear to auscultation  CARDIAC: regular rate and rhythm and no murmurs, clicks, or gallops  PULSES: 2/2 throughout  BACK: no spinal or CVAT  ABDOMINAL: Soft, nontender.  Normal bowel sounds.  No hepatosplenomegaly or abnormal masses  BREAST: No breast masses or tenderness, No axillary masses or tenderness and No galactorrhea  REFLEXES: 2+ throughout  There is some mild tenderness of the left lateral epicondyle, some pain with range of motion of the elbow    PHQ 9/30/2020 9/23/2021 11/18/2022   PHQ-9 Total Score 0 0 0   Q9: Thoughts of better off dead/self-harm past 2 weeks Not at all Not at all Not at all     OSCAR-7 SCORE 9/30/2020 9/23/2021 11/18/2022   Total Score - - 0 (minimal anxiety)   Total Score 0 0 0         ASSESSMENT / PLAN:   (Z00.00) Encounter for annual wellness exam in Medicare patient  (primary encounter diagnosis)  Comment: " Up-to-date  Plan:     (F32.5) Major depressive disorder with single episode, in full remission (H)  Comment: Continues to be well controlled, continue medication  Plan: CT BEHAV ASSMT W/SCORE & DOCD/STAND INSTRUMENT            (E78.5) Hyperlipidemia LDL goal <130  Comment: Due for recheck on lab, continue medication  Plan: rosuvastatin (CRESTOR) 10 MG tablet, Lipid         panel reflex to direct LDL Fasting            (E03.9) Acquired hypothyroidism  Comment: Clinically stable, check lab  Plan: levothyroxine (SYNTHROID/LEVOTHROID) 75 MCG         tablet, TSH with free T4 reflex            (F41.9) Anxiety  Comment: Well-controlled, continue medication  Plan: CT BEHAV ASSMT W/SCORE & DOCD/STAND INSTRUMENT            (M77.12) Left lateral epicondylitis  Comment: Advised about this condition, discussed local care including icing, potentially tennis elbow brace, given a handout with some stretches and exercises.  Advised to watch ergonomics with things like holding her phone, working on the computer.  Suggest she use Voltaren gel to the area to help reduce inflammation.  Plan: Refer to orthopedics if not improving.    (Z13.1) Screening for diabetes mellitus  Comment:   Plan: Basic metabolic panel  (Ca, Cl, CO2, Creat,         Gluc, K, Na, BUN)            (Z12.31) Encounter for screening mammogram for breast cancer  Comment:   Plan:     Patient has been advised of split billing requirements and indicates understanding: Yes      COUNSELING:  Reviewed preventive health counseling, as reflected in patient instructions        She reports that she has never smoked. She has never used smokeless tobacco.      Appropriate preventive services were discussed with this patient, including applicable screening as appropriate for cardiovascular disease, diabetes, osteopenia/osteoporosis, and glaucoma.  As appropriate for age/gender, discussed screening for colorectal cancer, prostate cancer, breast cancer, and cervical cancer. Checklist  reviewing preventive services available has been given to the patient.    Reviewed patients plan of care and provided an AVS. The Basic Care Plan (routine screening as documented in Health Maintenance) for Yessi meets the Care Plan requirement. This Care Plan has been established and reviewed with the Patient.      Alisia Jordan MD  [facility]    Identified Health Risks:

## 2022-11-19 LAB — TSH SERPL DL<=0.005 MIU/L-ACNC: 0.77 UIU/ML (ref 0.3–4.2)

## 2022-12-05 ENCOUNTER — LAB (OUTPATIENT)
Dept: LAB | Facility: CLINIC | Age: 67
End: 2022-12-05
Payer: COMMERCIAL

## 2022-12-05 DIAGNOSIS — R79.9 ABNORMAL BLOOD CHEMISTRY: ICD-10-CM

## 2022-12-05 LAB
CREAT SERPL-MCNC: 0.98 MG/DL (ref 0.51–0.95)
GFR SERPL CREATININE-BSD FRML MDRD: 62 ML/MIN/1.73M2

## 2022-12-05 PROCEDURE — 82565 ASSAY OF CREATININE: CPT

## 2022-12-05 PROCEDURE — 36415 COLL VENOUS BLD VENIPUNCTURE: CPT

## 2022-12-14 ENCOUNTER — HOSPITAL ENCOUNTER (OUTPATIENT)
Dept: MAMMOGRAPHY | Facility: CLINIC | Age: 67
Discharge: HOME | End: 2022-12-14
Attending: INTERNAL MEDICINE | Admitting: INTERNAL MEDICINE
Payer: COMMERCIAL

## 2022-12-14 DIAGNOSIS — Z12.31 ENCOUNTER FOR SCREENING MAMMOGRAM FOR BREAST CANCER: ICD-10-CM

## 2022-12-14 PROCEDURE — 77067 SCR MAMMO BI INCL CAD: CPT

## 2023-10-23 DIAGNOSIS — F32.5 MAJOR DEPRESSIVE DISORDER WITH SINGLE EPISODE, IN FULL REMISSION (H): ICD-10-CM

## 2023-10-23 DIAGNOSIS — F41.9 ANXIETY: ICD-10-CM

## 2023-10-24 NOTE — TELEPHONE ENCOUNTER
Pt has upcoming appt with Dr Marrero in November.     Provider approval needed. Dr Jordan out ill all week.

## 2023-10-25 RX ORDER — SERTRALINE HYDROCHLORIDE 100 MG/1
TABLET, FILM COATED ORAL
Qty: 90 TABLET | Refills: 0 | Status: SHIPPED | OUTPATIENT
Start: 2023-10-25 | End: 2023-11-22

## 2023-11-18 ASSESSMENT — ENCOUNTER SYMPTOMS
NERVOUS/ANXIOUS: 0
SHORTNESS OF BREATH: 0
CONSTIPATION: 0
BREAST MASS: 0
JOINT SWELLING: 0
HEADACHES: 0
DIZZINESS: 0
HEARTBURN: 0
FEVER: 0
WEAKNESS: 0
EYE PAIN: 0
PALPITATIONS: 0
CHILLS: 0
PARESTHESIAS: 0
MYALGIAS: 0
HEMATURIA: 0
DYSURIA: 0
FREQUENCY: 0
DIARRHEA: 0
NAUSEA: 0
SORE THROAT: 0
ABDOMINAL PAIN: 0
ARTHRALGIAS: 0
HEMATOCHEZIA: 0
COUGH: 0

## 2023-11-18 ASSESSMENT — ACTIVITIES OF DAILY LIVING (ADL): CURRENT_FUNCTION: NO ASSISTANCE NEEDED

## 2023-11-22 ENCOUNTER — OFFICE VISIT (OUTPATIENT)
Dept: INTERNAL MEDICINE | Facility: CLINIC | Age: 68
End: 2023-11-22
Payer: COMMERCIAL

## 2023-11-22 VITALS
HEART RATE: 88 BPM | BODY MASS INDEX: 22.32 KG/M2 | OXYGEN SATURATION: 98 % | HEIGHT: 66 IN | DIASTOLIC BLOOD PRESSURE: 68 MMHG | TEMPERATURE: 98.3 F | RESPIRATION RATE: 15 BRPM | SYSTOLIC BLOOD PRESSURE: 130 MMHG | WEIGHT: 138.9 LBS

## 2023-11-22 DIAGNOSIS — F41.9 ANXIETY: ICD-10-CM

## 2023-11-22 DIAGNOSIS — Z00.00 ENCOUNTER FOR MEDICARE ANNUAL WELLNESS EXAM: ICD-10-CM

## 2023-11-22 DIAGNOSIS — E03.9 ACQUIRED HYPOTHYROIDISM: ICD-10-CM

## 2023-11-22 DIAGNOSIS — F32.5 MAJOR DEPRESSIVE DISORDER WITH SINGLE EPISODE, IN FULL REMISSION (H): ICD-10-CM

## 2023-11-22 DIAGNOSIS — E78.5 HYPERLIPIDEMIA LDL GOAL <130: Primary | ICD-10-CM

## 2023-11-22 LAB
ERYTHROCYTE [DISTWIDTH] IN BLOOD BY AUTOMATED COUNT: 13.2 % (ref 10–15)
HCT VFR BLD AUTO: 39.3 % (ref 35–47)
HGB BLD-MCNC: 12.8 G/DL (ref 11.7–15.7)
MCH RBC QN AUTO: 29.2 PG (ref 26.5–33)
MCHC RBC AUTO-ENTMCNC: 32.6 G/DL (ref 31.5–36.5)
MCV RBC AUTO: 90 FL (ref 78–100)
PLATELET # BLD AUTO: 245 10E3/UL (ref 150–450)
RBC # BLD AUTO: 4.38 10E6/UL (ref 3.8–5.2)
WBC # BLD AUTO: 6.4 10E3/UL (ref 4–11)

## 2023-11-22 PROCEDURE — 80061 LIPID PANEL: CPT | Performed by: INTERNAL MEDICINE

## 2023-11-22 PROCEDURE — 99214 OFFICE O/P EST MOD 30 MIN: CPT | Mod: 25 | Performed by: INTERNAL MEDICINE

## 2023-11-22 PROCEDURE — 80053 COMPREHEN METABOLIC PANEL: CPT | Performed by: INTERNAL MEDICINE

## 2023-11-22 PROCEDURE — 36415 COLL VENOUS BLD VENIPUNCTURE: CPT | Performed by: INTERNAL MEDICINE

## 2023-11-22 PROCEDURE — G0439 PPPS, SUBSEQ VISIT: HCPCS | Performed by: INTERNAL MEDICINE

## 2023-11-22 PROCEDURE — 84443 ASSAY THYROID STIM HORMONE: CPT | Performed by: INTERNAL MEDICINE

## 2023-11-22 PROCEDURE — 85027 COMPLETE CBC AUTOMATED: CPT | Performed by: INTERNAL MEDICINE

## 2023-11-22 RX ORDER — SERTRALINE HYDROCHLORIDE 100 MG/1
100 TABLET, FILM COATED ORAL DAILY
Qty: 90 TABLET | Refills: 1 | Status: SHIPPED | OUTPATIENT
Start: 2023-11-22 | End: 2024-06-28

## 2023-11-22 RX ORDER — ROSUVASTATIN CALCIUM 10 MG/1
10 TABLET, COATED ORAL DAILY
Qty: 90 TABLET | Refills: 3 | Status: SHIPPED | OUTPATIENT
Start: 2023-11-22

## 2023-11-22 RX ORDER — LEVOTHYROXINE SODIUM 75 UG/1
75 TABLET ORAL DAILY
Qty: 90 TABLET | Refills: 3 | Status: SHIPPED | OUTPATIENT
Start: 2023-11-22

## 2023-11-22 ASSESSMENT — ANXIETY QUESTIONNAIRES
3. WORRYING TOO MUCH ABOUT DIFFERENT THINGS: NOT AT ALL
GAD7 TOTAL SCORE: 0
2. NOT BEING ABLE TO STOP OR CONTROL WORRYING: NOT AT ALL
7. FEELING AFRAID AS IF SOMETHING AWFUL MIGHT HAPPEN: NOT AT ALL
1. FEELING NERVOUS, ANXIOUS, OR ON EDGE: NOT AT ALL
IF YOU CHECKED OFF ANY PROBLEMS ON THIS QUESTIONNAIRE, HOW DIFFICULT HAVE THESE PROBLEMS MADE IT FOR YOU TO DO YOUR WORK, TAKE CARE OF THINGS AT HOME, OR GET ALONG WITH OTHER PEOPLE: NOT DIFFICULT AT ALL
4. TROUBLE RELAXING: NOT AT ALL
5. BEING SO RESTLESS THAT IT IS HARD TO SIT STILL: NOT AT ALL
6. BECOMING EASILY ANNOYED OR IRRITABLE: NOT AT ALL
GAD7 TOTAL SCORE: 0

## 2023-11-22 ASSESSMENT — ENCOUNTER SYMPTOMS
BREAST MASS: 0
PALPITATIONS: 0
ARTHRALGIAS: 0
HEMATOCHEZIA: 0
DIZZINESS: 0
DYSURIA: 0
HEADACHES: 0
SHORTNESS OF BREATH: 0
CHILLS: 0
ABDOMINAL PAIN: 0
SORE THROAT: 0
COUGH: 0
CONSTIPATION: 0
NERVOUS/ANXIOUS: 0
WEAKNESS: 0
EYE PAIN: 0
DIARRHEA: 0
NAUSEA: 0
FEVER: 0
JOINT SWELLING: 0
HEARTBURN: 0
FREQUENCY: 0
PARESTHESIAS: 0
HEMATURIA: 0
MYALGIAS: 0

## 2023-11-22 ASSESSMENT — ACTIVITIES OF DAILY LIVING (ADL): CURRENT_FUNCTION: NO ASSISTANCE NEEDED

## 2023-11-22 NOTE — NURSING NOTE
"/68   Pulse 113   Temp 98.3  F (36.8  C) (Tympanic)   Resp 15   Ht 1.676 m (5' 6\")   Wt 63 kg (138 lb 14.4 oz)   SpO2 98%   BMI 22.42 kg/m      "

## 2023-11-22 NOTE — PROGRESS NOTES
"SUBJECTIVE:   Whitney is a 68 year old, presenting for the following:  Medicare Visit        11/22/2023    10:53 AM   Additional Questions   Roomed by jason   Accompanied by self         11/22/2023    10:53 AM   Patient Reported Additional Medications   Patient reports taking the following new medications none       Are you in the first 12 months of your Medicare coverage?  No    Healthy Habits:     In general, how would you rate your overall health?  Excellent    Frequency of exercise:  6-7 days/week    Duration of exercise:  30-45 minutes    Do you usually eat at least 4 servings of fruit and vegetables a day, include whole grains    & fiber and avoid regularly eating high fat or \"junk\" foods?  Yes    Taking medications regularly:  Yes    Medication side effects:  None    Ability to successfully perform activities of daily living:  No assistance needed    Home Safety:  No safety concerns identified    Hearing Impairment:  No hearing concerns    In the past 6 months, have you been bothered by leaking of urine?  No    In general, how would you rate your overall mental or emotional health?  Excellent      Today's PHQ-9 Score:       11/22/2023    10:40 AM   PHQ-9 SCORE   PHQ-9 Total Score MyChart 0   PHQ-9 Total Score 0           Have you ever done Advance Care Planning? (For example, a Health Directive, POLST, or a discussion with a medical provider or your loved ones about your wishes): No, advance care planning information given to patient to review.  Advanced care planning was discussed at today's visit.       Fall risk  Fallen 2 or more times in the past year?: No  Any fall with injury in the past year?: No     Cognitive Screening   1) Repeat 3 items (Leader, Season, Table)    2) Clock draw: NORMAL  3) 3 item recall: Recalls 3 objects  Results: 3 items recalled: COGNITIVE IMPAIRMENT LESS LIKELY    Mini-CogTM Copyright S Fav[employer]. Licensed by the author for use in [facility]; reprinted with permission " ([email or web address]). All rights reserved.      Do you have sleep apnea, excessive snoring or daytime drowsiness? : no    Reviewed and updated as needed this visit by clinical staff   Tobacco                Reviewed and updated as needed this visit by Provider                   Past Medical History:   Diagnosis Date     Abnormal Pap smear 01/01/1991    colposcopy     GERD (gastroesophageal reflux disease)      Hashimoto's thyroiditis     biopsy left nodule     Hypothyroidism      IBS (irritable bowel syndrome)      Low back pain     episodic: MRI mild degenerative changes 2012     Lyme meningitis     presented with facial weakness     Major depression 05/01/2012     Uterine fibroid        Past Surgical History:   Procedure Laterality Date     NO HISTORY OF SURGERY         Current Outpatient Medications   Medication Sig Dispense Refill     ASPIRIN 81 MG OR TABS Every 3rd day       CALCIUM-VITAMIN D PO Take by mouth daily       famotidine (PEPCID) 10 MG tablet Take 10 mg by mouth daily       levothyroxine (SYNTHROID/LEVOTHROID) 75 MCG tablet Take 1 tablet (75 mcg) by mouth daily 90 tablet 3     Multiple Vitamin (MULTI-VITAMIN PO) Take by mouth as needed        rosuvastatin (CRESTOR) 10 MG tablet Take 1 tablet (10 mg) by mouth daily 90 tablet 3     sertraline (ZOLOFT) 100 MG tablet TAKE 1 TABLET DAILY 90 tablet 0     Naproxen Sodium (ALEVE PO) Take 220 mg by mouth as needed for moderate pain (Patient not taking: Reported on 11/22/2023)         Family History   Problem Relation Age of Onset     Thyroid Disease Mother      C.A.D. Father 80     Thyroid Disease Sister      Thyroid Disease Sister      Diabetes Sister         Type 1     Hyperlipidemia Sister      Thyroid Disease Sister        Social History     Tobacco Use     Smoking status: Never     Smokeless tobacco: Never   Substance Use Topics     Alcohol use: No              11/18/2023     4:23 PM   Alcohol Use   Prescreen: >3 drinks/day or >7 drinks/week? Not Applicable        Do you have a current opioid prescription? No  Do you use any other controlled substances or medications that are not prescribed by a provider? None          Hyperlipidemia Follow-Up    Are you regularly taking any medication or supplement to lower your cholesterol?   Yes- Crestor  Are you having muscle aches or other side effects that you think could be caused by your cholesterol lowering medication?  No    Depression and Anxiety Follow-Up  How are you doing with your depression since your last visit? No change  How are you doing with your anxiety since your last visit?  No change  Are you having other symptoms that might be associated with depression or anxiety? No  Have you had a significant life event? No   Do you have any concerns with your use of alcohol or other drugs? No         9/23/2021    10:18 AM 11/18/2022    10:12 AM 11/22/2023    10:40 AM   PHQ   PHQ-9 Total Score 0 0 0   Q9: Thoughts of better off dead/self-harm past 2 weeks Not at all Not at all Not at all         9/23/2021    10:18 AM 11/18/2022    10:12 AM 11/22/2023    10:40 AM   OSCAR-7 SCORE   Total Score  0 (minimal anxiety) 0 (minimal anxiety)   Total Score 0 0 0        Hypothyroidism Follow-up    Since last visit, patient describes the following symptoms: Weight stable, no hair loss, no skin changes, no constipation, no loose stools    Current providers sharing in care for this patient include:   Patient Care Team:  Js Marrero MD as PCP - Alisia Terrell MD as Assigned PCP    The following health maintenance items are reviewed in Epic and correct as of today:  Health Maintenance   Topic Date Due     RSV VACCINE (Pregnancy & 60+) (1 - 1-dose 60+ series) Never done     LIPID  11/18/2023     ANNUAL REVIEW OF HM ORDERS  11/18/2023     MEDICARE ANNUAL WELLNESS VISIT  11/18/2023     TSH W/FREE T4 REFLEX  11/18/2023     DTAP/TDAP/TD IMMUNIZATION (5 - Td or Tdap) 02/28/2024     PHQ-9  05/22/2024     OSCAR ASSESSMENT   "11/22/2024     FALL RISK ASSESSMENT  11/22/2024     MAMMO SCREENING  12/14/2024     ADVANCE CARE PLANNING  10/02/2026     DEXA  10/27/2030     COLORECTAL CANCER SCREENING  05/11/2031     HEPATITIS C SCREENING  Completed     DEPRESSION ACTION PLAN  Completed     INFLUENZA VACCINE  Completed     Pneumococcal Vaccine: 65+ Years  Completed     ZOSTER IMMUNIZATION  Completed     COVID-19 Vaccine  Completed     IPV IMMUNIZATION  Aged Out     HPV IMMUNIZATION  Aged Out     MENINGITIS IMMUNIZATION  Aged Out     RSV MONOCLONAL ANTIBODY  Aged Out     Lab work is in process      Review of Systems   Constitutional:  Negative for chills and fever.   HENT:  Negative for congestion, ear pain, hearing loss and sore throat.    Eyes:  Negative for pain and visual disturbance.   Respiratory:  Negative for cough and shortness of breath.    Cardiovascular:  Negative for chest pain, palpitations and peripheral edema.   Gastrointestinal:  Negative for abdominal pain, constipation, diarrhea, heartburn, hematochezia and nausea.   Breasts:  Negative for tenderness, breast mass and discharge.   Genitourinary:  Negative for dysuria, frequency, genital sores, hematuria, pelvic pain, urgency, vaginal bleeding and vaginal discharge.   Musculoskeletal:  Negative for arthralgias, joint swelling and myalgias.   Skin:  Negative for rash.   Neurological:  Negative for dizziness, weakness, headaches and paresthesias.   Psychiatric/Behavioral:  Negative for mood changes. The patient is not nervous/anxious.      OBJECTIVE:   /68   Pulse 113   Temp 98.3  F (36.8  C) (Tympanic)   Resp 15   Ht 1.676 m (5' 6\")   Wt 63 kg (138 lb 14.4 oz)   SpO2 98%   BMI 22.42 kg/m   Estimated body mass index is 22.44 kg/m  as calculated from the following:    Height as of this encounter: 1.676 m (5' 6\").    Weight as of 11/18/22: 63 kg (139 lb).  Physical Exam  GENERAL APPEARANCE: healthy, alert and no distress  EYES: Eyes grossly normal to inspection, PERRL " and conjunctivae and sclerae normal  HENT: ear canals and TM's normal, nose and mouth without ulcers or lesions, oropharynx clear and oral mucous membranes moist  RESP: lungs clear to auscultation - no rales, rhonchi or wheezes  BREAST: normal without masses, tenderness or nipple discharge and no palpable axillary masses or adenopathy  CV: regular rate and rhythm, normal S1 S2,   ABDOMEN: soft, nontender, and bowel sounds normal  MS: no musculoskeletal defects are noted and gait is age appropriate without ataxia  NEURO: Normal strength and tone, sensory exam grossly normal, mentation intact and speech normal  PSYCH: mentation appears normal and affect normal/bright    ASSESSMENT / PLAN:     (Z00.00) Encounter for Medicare annual wellness exam  Plan: Patient states that she gets mammogram every other year, up-to-date on DEXA and colonoscopy, check CBC with platelets, reviewed immunizations            (E78.5) Hyperlipidemia LDL goal <130    Plan: Lipid panel reflex to direct LDL Non-fasting,         Comprehensive metabolic panel, rosuvastatin         (CRESTOR) 10 MG tablet refilled as directed .explained clearly about the medication,insructions and side effects.            (F32.5) Major depressive disorder with single episode, in full remission (H24)  (F41.9) Anxiety  Comment: Stable  Plan: sertraline (ZOLOFT) 100 MG tabletrefilled as directed .explained clearly about the medication,insructions and side effects.      (E03.9) Acquired hypothyroidism  Plan: TSH WITH FREE T4 REFLEX, levothyroxine         (SYNTHROID/LEVOTHROID) 75 MCG tablet refilled as directed .explained clearly about the medication,insructions and side effects.            Patient has been advised of split billing requirements and indicates understanding: Yes      COUNSELING:  Reviewed preventive health counseling, as reflected in patient instructions       Regular exercise       Healthy diet/nutrition        She reports that she has never smoked. She  has never used smokeless tobacco.      Appropriate preventive services were discussed with this patient, including applicable screening as appropriate for fall prevention, nutrition, physical activity, Tobacco-use cessation, weight loss and cognition.  Checklist reviewing preventive services available has been given to the patient.    Reviewed patients plan of care and provided an AVS. The Basic Care Plan (routine screening as documented in Health Maintenance) for Yessi meets the Care Plan requirement. This Care Plan has been established and reviewed with the Patient.          Js Marrero MD  [facility]    Identified Health Risks:

## 2023-11-23 LAB
ALBUMIN SERPL BCG-MCNC: 4.4 G/DL (ref 3.5–5.2)
ALP SERPL-CCNC: 48 U/L (ref 40–150)
ALT SERPL W P-5'-P-CCNC: 18 U/L (ref 0–50)
ANION GAP SERPL CALCULATED.3IONS-SCNC: 10 MMOL/L (ref 7–15)
AST SERPL W P-5'-P-CCNC: 25 U/L (ref 0–45)
BILIRUB SERPL-MCNC: 0.4 MG/DL
BUN SERPL-MCNC: 18.8 MG/DL (ref 8–23)
CALCIUM SERPL-MCNC: 9.8 MG/DL (ref 8.8–10.2)
CHLORIDE SERPL-SCNC: 104 MMOL/L (ref 98–107)
CHOLEST SERPL-MCNC: 180 MG/DL
CREAT SERPL-MCNC: 1 MG/DL (ref 0.51–0.95)
DEPRECATED HCO3 PLAS-SCNC: 26 MMOL/L (ref 22–29)
EGFRCR SERPLBLD CKD-EPI 2021: 60 ML/MIN/1.73M2
GLUCOSE SERPL-MCNC: 99 MG/DL (ref 70–99)
HDLC SERPL-MCNC: 63 MG/DL
LDLC SERPL CALC-MCNC: 96 MG/DL
NONHDLC SERPL-MCNC: 117 MG/DL
POTASSIUM SERPL-SCNC: 5.1 MMOL/L (ref 3.4–5.3)
PROT SERPL-MCNC: 7.2 G/DL (ref 6.4–8.3)
SODIUM SERPL-SCNC: 140 MMOL/L (ref 135–145)
TRIGL SERPL-MCNC: 106 MG/DL
TSH SERPL DL<=0.005 MIU/L-ACNC: 0.67 UIU/ML (ref 0.3–4.2)

## 2024-04-04 ENCOUNTER — OFFICE VISIT (OUTPATIENT)
Dept: INTERNAL MEDICINE | Facility: CLINIC | Age: 69
End: 2024-04-04
Payer: COMMERCIAL

## 2024-04-04 VITALS
BODY MASS INDEX: 22.88 KG/M2 | HEART RATE: 104 BPM | TEMPERATURE: 97.7 F | HEIGHT: 66 IN | DIASTOLIC BLOOD PRESSURE: 68 MMHG | OXYGEN SATURATION: 99 % | WEIGHT: 142.4 LBS | RESPIRATION RATE: 13 BRPM | SYSTOLIC BLOOD PRESSURE: 122 MMHG

## 2024-04-04 DIAGNOSIS — Z01.818 PREOP GENERAL PHYSICAL EXAM: Primary | ICD-10-CM

## 2024-04-04 DIAGNOSIS — E78.5 HYPERLIPIDEMIA LDL GOAL <130: ICD-10-CM

## 2024-04-04 DIAGNOSIS — H26.9 CATARACT OF BOTH EYES, UNSPECIFIED CATARACT TYPE: ICD-10-CM

## 2024-04-04 DIAGNOSIS — F32.5 MAJOR DEPRESSIVE DISORDER WITH SINGLE EPISODE, IN FULL REMISSION (H): ICD-10-CM

## 2024-04-04 DIAGNOSIS — E03.9 ACQUIRED HYPOTHYROIDISM: ICD-10-CM

## 2024-04-04 LAB
HGB BLD-MCNC: 12.7 G/DL (ref 11.7–15.7)
POTASSIUM SERPL-SCNC: 4.7 MMOL/L (ref 3.4–5.3)

## 2024-04-04 PROCEDURE — 84132 ASSAY OF SERUM POTASSIUM: CPT | Performed by: INTERNAL MEDICINE

## 2024-04-04 PROCEDURE — 36415 COLL VENOUS BLD VENIPUNCTURE: CPT | Performed by: INTERNAL MEDICINE

## 2024-04-04 PROCEDURE — 85018 HEMOGLOBIN: CPT | Performed by: INTERNAL MEDICINE

## 2024-04-04 PROCEDURE — 99214 OFFICE O/P EST MOD 30 MIN: CPT | Performed by: INTERNAL MEDICINE

## 2024-04-04 RX ORDER — OMEGA-3/DHA/EPA/FISH OIL 60 MG-90MG
CAPSULE ORAL
COMMUNITY

## 2024-04-04 ASSESSMENT — PATIENT HEALTH QUESTIONNAIRE - PHQ9: SUM OF ALL RESPONSES TO PHQ QUESTIONS 1-9: 0

## 2024-04-04 NOTE — PROGRESS NOTES
Preoperative Evaluation  [place] [address]  [address]  [facility] [address]  Phone: [phone]  Primary Provider: Lucio Shaikh  Pre-op Performing Provider: LUCIO SHAIKH  Apr 4, 2024       Whitney is a 68 year old, presenting for the following:  Pre-Op Exam        4/4/2024     7:11 AM   Additional Questions   Roomed by jason   Accompanied by self         4/4/2024     7:11 AM   Patient Reported Additional Medications   Patient reports taking the following new medications none     Surgical Information  Surgery/Procedure: Rt eye cataract 4/10/24 & Lt 5/8/24  Surgery Location: [facility]  Surgeon: Dr. Agustin  Surgery Date: 4/10/24 & 5/8/24  Time of Surgery: 1345  Where patient plans to recover: At home with family  Fax number for surgical facility: [phone]    Assessment & Plan     The proposed surgical procedure is considered LOW risk.    (Z01.818) Preop general physical exam  (primary encounter diagnosis)  Comment: bilateral cataract repair   Plan: Hemoglobin, Potassium            (H26.9) Cataract of both eyes, unspecified cataract type  Plan: bilateral cataract repair     (F32.5) Major depressive disorder with single episode, in full remission (H24)  Plan: stable on Zoloft 100 mg daily    (E03.9) Acquired hypothyroidism  Plan: last TSH normal. Continue levoxyl 75 mcg daily      (E78.5) Hyperlipidemia LDL goal <130  Plan: not on any medications, last lipids stable           - No identified additional risk factors other than previously addressed    Antiplatelet or Anticoagulation Medication Instructions   - aspirin: Discontinue aspirin 7- days prior to procedure to reduce bleeding risk.     Additional Medication Instructions   - Statins: Continue taking on the day of surgery.    - NSAID's: HOLD 3 days before surgery.     - SSRIs, SNRIs, TCAs, Antipsychotics: Continue without modification.     Recommendation  APPROVAL GIVEN to  proceed with proposed procedure, without further diagnostic evaluation.    Review of the result(s) of each unique test - HGb      Subjective       HPI related to upcoming procedure: Bilateral cataract repair         4/1/2024     1:24 PM   Preop Questions   1. Have you ever had a heart attack or stroke? No   2. Have you ever had surgery on your heart or blood vessels, such as a stent placement, a coronary artery bypass, or surgery on an artery in your head, neck, heart, or legs? No   3. Do you have chest pain with activity? No   4. Do you have a history of  heart failure? No   5. Do you currently have a cold, bronchitis or symptoms of other infection? No   6. Do you have a cough, shortness of breath, or wheezing? No   7. Do you or anyone in your family have previous history of blood clots? No   8. Do you or does anyone in your family have a serious bleeding problem such as prolonged bleeding following surgeries or cuts? No   9. Have you ever had problems with anemia or been told to take iron pills? No   10. Have you had any abnormal blood loss such as black, tarry or bloody stools, or abnormal vaginal bleeding? No   11. Have you ever had a blood transfusion? No   12. Are you willing to have a blood transfusion if it is medically needed before, during, or after your surgery? Yes   13. Have you or any of your relatives ever had problems with anesthesia? No   14. Do you have sleep apnea, excessive snoring or daytime drowsiness? No   15. Do you have any artifical heart valves or other implanted medical devices like a pacemaker, defibrillator, or continuous glucose monitor? No   16. Do you have artificial joints? No   17. Are you allergic to latex? No       Health Care Directive  Patient does not have a Health Care Directive or Living Will: Discussed advance care planning with patient; information given to patient to review.        Status of Chronic Conditions:    DEPRESSION - Patient has a  history of Depression of  moderate severity requiring medication for control with recent symptoms being stable..Current symptoms of depression include none.     HYPERLIPIDEMIA - Patient has a long history of  Hyperlipidemia requiring medication for treatment with recent good control. Patient reports no problems or side effects with the medication.     HYPOTHYROIDISM - Patient has a longstanding history of chronic Hypothyroidism. Patient has been doing well, noting no tremor, insomnia, hair loss or changes in skin texture. Continues to take medications as directed, without adverse reactions or side effects. Last TSH   Lab Results   Component Value Date    TSH 0.67 11/22/2023   .      Patient Active Problem List    Diagnosis Date Noted    Anxiety 07/15/2018     Priority: Medium    Hyperlipidemia LDL goal <130 07/14/2016     Priority: Medium    Low back pain      Priority: Medium     episodic      Hashimoto's thyroiditis      Priority: Medium     biopsy left nodule      Major depressive disorder with single episode, in full remission (H24) 07/01/2014     Priority: Medium    Advanced directives, counseling/discussion 07/17/2012     Priority: Medium     Discussed advance care planning with patient; information given to patient to review. 7/17/2012         Acquired hypothyroidism 10/22/2009     Priority: Medium      Past Medical History:   Diagnosis Date    Abnormal Pap smear 01/01/1991    colposcopy    GERD (gastroesophageal reflux disease)     Hashimoto's thyroiditis     biopsy left nodule    Hypothyroidism     IBS (irritable bowel syndrome)     Low back pain     episodic: MRI mild degenerative changes 2012    Lyme meningitis     presented with facial weakness    Major depression 05/01/2012    Uterine fibroid      Past Surgical History:   Procedure Laterality Date    COLONOSCOPY      NO HISTORY OF SURGERY       Current Outpatient Medications   Medication Sig Dispense Refill    ASPIRIN 81 MG OR TABS Every 3rd day      CALCIUM-VITAMIN D PO Take by  "mouth daily      famotidine (PEPCID) 10 MG tablet Take 10 mg by mouth daily      levothyroxine (SYNTHROID/LEVOTHROID) 75 MCG tablet Take 1 tablet (75 mcg) by mouth daily 90 tablet 3    Multiple Vitamin (MULTI-VITAMIN PO) Take by mouth as needed       Naproxen Sodium (ALEVE PO) Take 220 mg by mouth as needed for moderate pain (Patient not taking: Reported on 11/22/2023)      rosuvastatin (CRESTOR) 10 MG tablet Take 1 tablet (10 mg) by mouth daily 90 tablet 3    sertraline (ZOLOFT) 100 MG tablet Take 1 tablet (100 mg) by mouth daily 90 tablet 1       No Known Allergies     Social History     Tobacco Use    Smoking status: Never    Smokeless tobacco: Never   Substance Use Topics    Alcohol use: No     Family History   Problem Relation Age of Onset    Thyroid Disease Mother     Hyperlipidemia Mother     C.A.D. Father 80    Thyroid Disease Sister     Depression Sister     Anxiety Disorder Sister     Thyroid Disease Sister     Diabetes Sister         Type 1    Hyperlipidemia Sister     Thyroid Disease Sister      History   Drug Use No         Review of Systems    Review of Systems  CONSTITUTIONAL: NEGATIVE for fever, chills,    EYES: cataracts  ENT/MOUTH: NEGATIVE for ear, mouth and throat problems  RESP: NEGATIVE for significant cough or SOB  CV: NEGATIVE for chest pain, palpitations or peripheral edema  GI: NEGATIVE for nausea, abdominal pain, heartburn, or change in bowel habits  : NEGATIVE for frequency, dysuria, or hematuria  MUSCULOSKELETAL: NEGATIVE for significant arthralgias or myalgia  NEURO: NEGATIVE for weakness, dizziness or paresthesias  ENDOCRINE: NEGATIVE for temperature intolerance, skin/hair changes  HEME: NEGATIVE for bleeding problems  PSYCHIATRIC: NEGATIVE for changes in mood or affect    Objective    /68   Pulse 104   Temp 97.7  F (36.5  C) (Tympanic)   Resp 13   Ht 1.683 m (5' 6.25\")   Wt 64.6 kg (142 lb 6.4 oz)   SpO2 99%   BMI 22.81 kg/m     Estimated body mass index is 22.81 kg/m  " "as calculated from the following:    Height as of this encounter: 1.683 m (5' 6.25\").    Weight as of this encounter: 64.6 kg (142 lb 6.4 oz).  Physical Exam  GENERAL: alert and no distress  EYES: Eyes grossly normal to inspection, PERRL and conjunctivae and sclerae normal  HENT: ear canals and TM's normal, nose and mouth without ulcers or lesions  RESP: lungs clear to auscultation - no rales, rhonchi or wheezes  CV: regular rate and rhythm, normal S1 S2,   ABDOMEN: soft, nontender,  and bowel sounds normal  MS: no gross musculoskeletal defects noted, no edema  NEURO: Normal strength and tone, mentation intact and speech normal  PSYCH: mentation appears normal, affect normal/bright    Recent Labs   Lab Test 11/22/23  1134 12/05/22  1352 11/18/22  1058   HGB 12.8  --   --      --   --      --  141   POTASSIUM 5.1  --  4.7   CR 1.00* 0.98* 1.02*        Diagnostics  Recent Results (from the past 24 hour(s))   Hemoglobin    Collection Time: 04/04/24  7:44 AM   Result Value Ref Range    Hemoglobin 12.7 11.7 - 15.7 g/dL      No EKG required for low risk surgery (cataract, skin procedure, breast biopsy, etc).    Revised Cardiac Risk Index (RCRI)  The patient has the following serious cardiovascular risks for perioperative complications:   - No serious cardiac risks = 0 points             Signed Electronically by: Js Marrero MD  Copy of this evaluation report is provided to requesting physician.         "

## 2024-04-04 NOTE — NURSING NOTE
"/68   Pulse 104   Temp 97.7  F (36.5  C) (Tympanic)   Resp 13   Ht 1.683 m (5' 6.25\")   Wt 64.6 kg (142 lb 6.4 oz)   SpO2 99%   BMI 22.81 kg/m      "

## 2024-05-13 ENCOUNTER — MYC MEDICAL ADVICE (OUTPATIENT)
Dept: INTERNAL MEDICINE | Facility: CLINIC | Age: 69
End: 2024-05-13
Payer: COMMERCIAL

## 2024-05-15 ENCOUNTER — OFFICE VISIT (OUTPATIENT)
Dept: INTERNAL MEDICINE | Facility: CLINIC | Age: 69
End: 2024-05-15
Payer: COMMERCIAL

## 2024-05-15 VITALS
OXYGEN SATURATION: 98 % | RESPIRATION RATE: 14 BRPM | WEIGHT: 140.7 LBS | TEMPERATURE: 97.9 F | DIASTOLIC BLOOD PRESSURE: 70 MMHG | BODY MASS INDEX: 22.61 KG/M2 | HEART RATE: 104 BPM | SYSTOLIC BLOOD PRESSURE: 124 MMHG | HEIGHT: 66 IN

## 2024-05-15 DIAGNOSIS — M54.42 LEFT-SIDED LOW BACK PAIN WITH LEFT-SIDED SCIATICA, UNSPECIFIED CHRONICITY: Primary | ICD-10-CM

## 2024-05-15 PROCEDURE — 99214 OFFICE O/P EST MOD 30 MIN: CPT | Performed by: INTERNAL MEDICINE

## 2024-05-15 RX ORDER — METHYLPREDNISOLONE 4 MG
TABLET, DOSE PACK ORAL
Qty: 21 TABLET | Refills: 0 | Status: SHIPPED | OUTPATIENT
Start: 2024-05-15

## 2024-05-15 NOTE — PROGRESS NOTES
Assessment & Plan     (M54.42) Left-sided low back pain with left-sided sciatica, unspecified chronicity  (primary encounter diagnosis)  Plan: explained about the condition,  started on methylPREDNISolone (MEDROL DOSEPAK) 4 MG tablet        therapy pack as directed.explained clearly about the medication,insructions and side effects.  Advised OTC Ibuprofen as directed. .explained clearly about the medication,insructions and side effects.  Intermittent rest, proper lifting with avoidance of heavy lifting and intermittent use of heat discussed - avoid sleeping on a heating pad.  Call or return to clinic prn if these symtoms worsen, fail to improve as anticipated, or if new symptoms develop.       Prescription drug management  33 minutes spent by me on the date of the encounter doing chart review, history and exam, documentation and further activities per the note      Naman Olson is a 68 year old, presenting for the following health issues:  Consult (Back pain)      5/15/2024     2:12 PM   Additional Questions   Roomed by jason   Accompanied by self         5/15/2024     2:12 PM   Patient Reported Additional Medications   Patient reports taking the following new medications none     History of Present Illness       Reason for visit:  SCIATICA  Symptom onset:  More than a month  Symptoms include:  PAIN IN L BUTT CHEEK RADIATING TO HIP DOWN LEG TO KNEE  Symptom intensity:  Moderate  Symptom progression:  Staying the same  Had these symptoms before:  Yes  Has tried/received treatment for these symptoms:  Yes  Previous treatment was successful:  Yes  Prior treatment description:  MEDROL DOSE PACK  What makes it worse:  STANDING, ESPECIALLY IN ONE SPOT AND WALKING LONG DISTANCES  What makes it better:  SITTING DOWNShe exercises with enough effort to increase her heart rate 30 to 60 minutes per day.  She exercises with enough effort to increase her heart rate 6 days per week.   She is taking medications regularly.       Pt is a 68 year old female who is seen here to day  with complains of low back pain radiating to Lt buttcok and Lt leg intermittently since 2 mths but flared up about 4 days ago.There is no numbness/tingling in bilateral LE.No bladder or bowel incontinence.no weakness,pain increases with standing and walking long distances. patient started doing stretches and took aleve /advil  without much symptom relief.  Patient states that she had had MRI of lumbar spine in 2012 and had bulging disc and was treated with a steroid Dosepak with good symptom relief.      Past Medical History:   Diagnosis Date    Abnormal Pap smear 01/01/1991    colposcopy    GERD (gastroesophageal reflux disease)     Hashimoto's thyroiditis     biopsy left nodule    Hypothyroidism     IBS (irritable bowel syndrome)     Low back pain     episodic: MRI mild degenerative changes 2012    Lyme meningitis     presented with facial weakness    Major depression 05/01/2012    Uterine fibroid      Current Outpatient Medications   Medication Sig Dispense Refill    ASPIRIN 81 MG OR TABS Every 3rd day      CALCIUM-VITAMIN D PO Take by mouth daily      famotidine (PEPCID) 10 MG tablet Take 10 mg by mouth daily      fish oil-omega-3 fatty acids 500 MG capsule       levothyroxine (SYNTHROID/LEVOTHROID) 75 MCG tablet Take 1 tablet (75 mcg) by mouth daily 90 tablet 3    methylPREDNISolone (MEDROL DOSEPAK) 4 MG tablet therapy pack Follow Package Directions 21 tablet 0    Multiple Vitamin (MULTI-VITAMIN PO) Take by mouth as needed       Naproxen Sodium (ALEVE PO) Take 220 mg by mouth as needed for moderate pain      rosuvastatin (CRESTOR) 10 MG tablet Take 1 tablet (10 mg) by mouth daily 90 tablet 3    sertraline (ZOLOFT) 100 MG tablet Take 1 tablet (100 mg) by mouth daily 90 tablet 1           Review of Systems  CONSTITUTIONAL: NEGATIVE for fever, chills,    RESP: NEGATIVE for significant cough or SOB  CV: NEGATIVE for chest pain, palpitations or peripheral  "edema  MUSCULOSKELETAL: Left buttock and left leg pain  NEURO: NEGATIVE for weakness, dizziness or paresthesias      Objective    /70   Pulse 104   Temp 97.9  F (36.6  C) (Tympanic)   Resp 14   Ht 1.683 m (5' 6.25\")   Wt 63.8 kg (140 lb 11.2 oz)   SpO2 98%   BMI 22.54 kg/m    Body mass index is 22.54 kg/m .  Physical Exam   GENERAL: alert and no distress  RESP: lungs clear to auscultation - no rales, rhonchi or wheezes  CV: regular rate and rhythm, normal S1 S2,    MS: No vertebral tenderness at this time, mild tenderness on the left gluteal area, SLR negative bilaterally.  NEURO: Normal strength and tone, reflexes normal mentation intact and speech normal      Signed Electronically by: Js Marrero MD          "

## 2024-05-15 NOTE — NURSING NOTE
"/70   Pulse 104   Temp 97.9  F (36.6  C) (Tympanic)   Resp 14   Ht 1.683 m (5' 6.25\")   Wt 63.8 kg (140 lb 11.2 oz)   SpO2 98%   BMI 22.54 kg/m      "

## 2024-05-29 ENCOUNTER — TELEPHONE (OUTPATIENT)
Dept: INTERNAL MEDICINE | Facility: CLINIC | Age: 69
End: 2024-05-29
Payer: COMMERCIAL

## 2024-05-29 DIAGNOSIS — M54.42 LEFT-SIDED LOW BACK PAIN WITH LEFT-SIDED SCIATICA, UNSPECIFIED CHRONICITY: Primary | ICD-10-CM

## 2024-05-29 NOTE — TELEPHONE ENCOUNTER
Called patient and left message to call the clinic back or check myc message    Myc message sent to patient.

## 2024-05-29 NOTE — TELEPHONE ENCOUNTER
Other: pt called would like to know if provider can send referral to [facility] and [place] [facility]? Pt is scheduled w/ spine provider with [facility], but she would like to see if other places have earlier openings.      [facility] this [facility] or would you prefer to receive a phone call?:   Patient would prefer a phone call   Okay to leave a detailed message?: Yes at Home number on file [phone] (home)

## 2024-05-29 NOTE — TELEPHONE ENCOUNTER
Please inform patient that I have referred her to go to [facility] spine and also [facility] , patient can call and schedule appointment    [facility] [place]   [address]   [facility] [address]   Phone: [phone]   Fax: [phone]           [facility]   [address] [facility]   [address]   [facility] [address]   Phone: [phone]   Fax: [phone]

## 2024-06-05 ENCOUNTER — TRANSFERRED RECORDS (OUTPATIENT)
Dept: HEALTH INFORMATION MANAGEMENT | Facility: CLINIC | Age: 69
End: 2024-06-05

## 2024-06-27 DIAGNOSIS — F41.9 ANXIETY: ICD-10-CM

## 2024-06-27 DIAGNOSIS — F32.5 MAJOR DEPRESSIVE DISORDER WITH SINGLE EPISODE, IN FULL REMISSION (H): ICD-10-CM

## 2024-06-28 RX ORDER — SERTRALINE HYDROCHLORIDE 100 MG/1
100 TABLET, FILM COATED ORAL DAILY
Qty: 90 TABLET | Refills: 0 | Status: SHIPPED | OUTPATIENT
Start: 2024-06-28 | End: 2024-09-26

## 2024-09-26 ENCOUNTER — TELEPHONE (OUTPATIENT)
Dept: INTERNAL MEDICINE | Facility: CLINIC | Age: 69
End: 2024-09-26
Payer: COMMERCIAL

## 2024-09-26 DIAGNOSIS — F41.9 ANXIETY: ICD-10-CM

## 2024-09-26 DIAGNOSIS — F32.5 MAJOR DEPRESSIVE DISORDER WITH SINGLE EPISODE, IN FULL REMISSION (H): ICD-10-CM

## 2024-09-26 RX ORDER — SERTRALINE HYDROCHLORIDE 100 MG/1
100 TABLET, FILM COATED ORAL DAILY
Qty: 15 TABLET | Refills: 0 | Status: SHIPPED | OUTPATIENT
Start: 2024-09-26 | End: 2024-10-01

## 2024-09-26 NOTE — TELEPHONE ENCOUNTER
Pt is due for depression follow up and PHq 9 in 1 week.  I have faxed 2 weeks med, please assist pt to schedule video visit next week

## 2024-09-29 ASSESSMENT — ANXIETY QUESTIONNAIRES
IF YOU CHECKED OFF ANY PROBLEMS ON THIS QUESTIONNAIRE, HOW DIFFICULT HAVE THESE PROBLEMS MADE IT FOR YOU TO DO YOUR WORK, TAKE CARE OF THINGS AT HOME, OR GET ALONG WITH OTHER PEOPLE: SOMEWHAT DIFFICULT
4. TROUBLE RELAXING: SEVERAL DAYS
1. FEELING NERVOUS, ANXIOUS, OR ON EDGE: SEVERAL DAYS
GAD7 TOTAL SCORE: 5
GAD7 TOTAL SCORE: 5
8. IF YOU CHECKED OFF ANY PROBLEMS, HOW DIFFICULT HAVE THESE MADE IT FOR YOU TO DO YOUR WORK, TAKE CARE OF THINGS AT HOME, OR GET ALONG WITH OTHER PEOPLE?: SOMEWHAT DIFFICULT
3. WORRYING TOO MUCH ABOUT DIFFERENT THINGS: SEVERAL DAYS
7. FEELING AFRAID AS IF SOMETHING AWFUL MIGHT HAPPEN: NOT AT ALL
5. BEING SO RESTLESS THAT IT IS HARD TO SIT STILL: SEVERAL DAYS
GAD7 TOTAL SCORE: 5
6. BECOMING EASILY ANNOYED OR IRRITABLE: NOT AT ALL
7. FEELING AFRAID AS IF SOMETHING AWFUL MIGHT HAPPEN: NOT AT ALL
2. NOT BEING ABLE TO STOP OR CONTROL WORRYING: SEVERAL DAYS

## 2024-09-30 ENCOUNTER — HOSPITAL ENCOUNTER (EMERGENCY)
Facility: CLINIC | Age: 69
Discharge: HOME | End: 2024-09-30
Attending: STUDENT IN AN ORGANIZED HEALTH CARE EDUCATION/TRAINING PROGRAM | Admitting: STUDENT IN AN ORGANIZED HEALTH CARE EDUCATION/TRAINING PROGRAM
Payer: COMMERCIAL

## 2024-09-30 ENCOUNTER — NURSE TRIAGE (OUTPATIENT)
Dept: INTERNAL MEDICINE | Facility: CLINIC | Age: 69
End: 2024-09-30
Payer: COMMERCIAL

## 2024-09-30 VITALS
TEMPERATURE: 97.5 F | SYSTOLIC BLOOD PRESSURE: 119 MMHG | HEART RATE: 87 BPM | BODY MASS INDEX: 22.28 KG/M2 | DIASTOLIC BLOOD PRESSURE: 69 MMHG | WEIGHT: 139.11 LBS | RESPIRATION RATE: 18 BRPM | OXYGEN SATURATION: 98 %

## 2024-09-30 DIAGNOSIS — F13.939 WITHDRAWAL FROM SEDATIVE, HYPNOTIC, OR ANXIOLYTIC DRUG (H): ICD-10-CM

## 2024-09-30 DIAGNOSIS — F41.9 ANXIETY: ICD-10-CM

## 2024-09-30 LAB
ANION GAP SERPL CALCULATED.3IONS-SCNC: 16 MMOL/L (ref 7–15)
BASOPHILS # BLD AUTO: 0.1 10E3/UL (ref 0–0.2)
BASOPHILS NFR BLD AUTO: 1 %
BUN SERPL-MCNC: 21.1 MG/DL (ref 8–23)
CALCIUM SERPL-MCNC: 9.5 MG/DL (ref 8.8–10.4)
CHLORIDE SERPL-SCNC: 103 MMOL/L (ref 98–107)
CREAT SERPL-MCNC: 1.09 MG/DL (ref 0.51–0.95)
EGFRCR SERPLBLD CKD-EPI 2021: 54 ML/MIN/1.73M2
EOSINOPHIL # BLD AUTO: 0 10E3/UL (ref 0–0.7)
EOSINOPHIL NFR BLD AUTO: 0 %
ERYTHROCYTE [DISTWIDTH] IN BLOOD BY AUTOMATED COUNT: 14 % (ref 10–15)
GLUCOSE SERPL-MCNC: 113 MG/DL (ref 70–99)
HCO3 SERPL-SCNC: 21 MMOL/L (ref 22–29)
HCT VFR BLD AUTO: 43.2 % (ref 35–47)
HGB BLD-MCNC: 13.7 G/DL (ref 11.7–15.7)
HOLD SPECIMEN: NORMAL
HOLD SPECIMEN: NORMAL
IMM GRANULOCYTES # BLD: 0 10E3/UL
IMM GRANULOCYTES NFR BLD: 1 %
LYMPHOCYTES # BLD AUTO: 1.2 10E3/UL (ref 0.8–5.3)
LYMPHOCYTES NFR BLD AUTO: 15 %
MCH RBC QN AUTO: 28.1 PG (ref 26.5–33)
MCHC RBC AUTO-ENTMCNC: 31.7 G/DL (ref 31.5–36.5)
MCV RBC AUTO: 89 FL (ref 78–100)
MONOCYTES # BLD AUTO: 0.3 10E3/UL (ref 0–1.3)
MONOCYTES NFR BLD AUTO: 3 %
NEUTROPHILS # BLD AUTO: 6.5 10E3/UL (ref 1.6–8.3)
NEUTROPHILS NFR BLD AUTO: 81 %
NRBC # BLD AUTO: 0 10E3/UL
NRBC BLD AUTO-RTO: 0 /100
PLATELET # BLD AUTO: 272 10E3/UL (ref 150–450)
POTASSIUM SERPL-SCNC: 5 MMOL/L (ref 3.4–5.3)
RBC # BLD AUTO: 4.87 10E6/UL (ref 3.8–5.2)
SODIUM SERPL-SCNC: 140 MMOL/L (ref 135–145)
TSH SERPL DL<=0.005 MIU/L-ACNC: 4.01 UIU/ML (ref 0.3–4.2)
WBC # BLD AUTO: 8.1 10E3/UL (ref 4–11)

## 2024-09-30 PROCEDURE — 85025 COMPLETE CBC W/AUTO DIFF WBC: CPT | Performed by: STUDENT IN AN ORGANIZED HEALTH CARE EDUCATION/TRAINING PROGRAM

## 2024-09-30 PROCEDURE — 85025 COMPLETE CBC W/AUTO DIFF WBC: CPT | Performed by: EMERGENCY MEDICINE

## 2024-09-30 PROCEDURE — 82947 ASSAY GLUCOSE BLOOD QUANT: CPT | Performed by: EMERGENCY MEDICINE

## 2024-09-30 PROCEDURE — 250N000013 HC RX MED GY IP 250 OP 250 PS 637: Performed by: STUDENT IN AN ORGANIZED HEALTH CARE EDUCATION/TRAINING PROGRAM

## 2024-09-30 PROCEDURE — 84443 ASSAY THYROID STIM HORMONE: CPT | Performed by: STUDENT IN AN ORGANIZED HEALTH CARE EDUCATION/TRAINING PROGRAM

## 2024-09-30 PROCEDURE — 99284 EMERGENCY DEPT VISIT MOD MDM: CPT

## 2024-09-30 PROCEDURE — 80048 BASIC METABOLIC PNL TOTAL CA: CPT | Performed by: EMERGENCY MEDICINE

## 2024-09-30 PROCEDURE — 250N000011 HC RX IP 250 OP 636: Performed by: STUDENT IN AN ORGANIZED HEALTH CARE EDUCATION/TRAINING PROGRAM

## 2024-09-30 PROCEDURE — 80048 BASIC METABOLIC PNL TOTAL CA: CPT | Performed by: STUDENT IN AN ORGANIZED HEALTH CARE EDUCATION/TRAINING PROGRAM

## 2024-09-30 PROCEDURE — 36415 COLL VENOUS BLD VENIPUNCTURE: CPT | Performed by: EMERGENCY MEDICINE

## 2024-09-30 RX ORDER — GABAPENTIN 100 MG/1
100 CAPSULE ORAL ONCE
Status: COMPLETED | OUTPATIENT
Start: 2024-09-30 | End: 2024-09-30

## 2024-09-30 RX ORDER — ONDANSETRON 4 MG/1
4 TABLET, ORALLY DISINTEGRATING ORAL EVERY 8 HOURS PRN
Qty: 10 TABLET | Refills: 0 | Status: SHIPPED | OUTPATIENT
Start: 2024-09-30 | End: 2024-10-03

## 2024-09-30 RX ORDER — GABAPENTIN 100 MG/1
CAPSULE ORAL
Qty: 147 CAPSULE | Refills: 0 | Status: SHIPPED | OUTPATIENT
Start: 2024-09-30 | End: 2024-10-28

## 2024-09-30 RX ORDER — ONDANSETRON 4 MG/1
4 TABLET, ORALLY DISINTEGRATING ORAL ONCE
Status: COMPLETED | OUTPATIENT
Start: 2024-09-30 | End: 2024-09-30

## 2024-09-30 RX ORDER — GABAPENTIN 300 MG/1
300 CAPSULE ORAL ONCE
Status: COMPLETED | OUTPATIENT
Start: 2024-09-30 | End: 2024-09-30

## 2024-09-30 RX ADMIN — GABAPENTIN 100 MG: 100 CAPSULE ORAL at 19:27

## 2024-09-30 RX ADMIN — GABAPENTIN 300 MG: 300 CAPSULE ORAL at 15:02

## 2024-09-30 RX ADMIN — ONDANSETRON 4 MG: 4 TABLET, ORALLY DISINTEGRATING ORAL at 15:02

## 2024-09-30 ASSESSMENT — COLUMBIA-SUICIDE SEVERITY RATING SCALE - C-SSRS
2. HAVE YOU ACTUALLY HAD ANY THOUGHTS OF KILLING YOURSELF IN THE PAST MONTH?: NO
6. HAVE YOU EVER DONE ANYTHING, STARTED TO DO ANYTHING, OR PREPARED TO DO ANYTHING TO END YOUR LIFE?: NO
1. IN THE PAST MONTH, HAVE YOU WISHED YOU WERE DEAD OR WISHED YOU COULD GO TO SLEEP AND NOT WAKE UP?: NO

## 2024-09-30 ASSESSMENT — ACTIVITIES OF DAILY LIVING (ADL)
ADLS_ACUITY_SCORE: 35
ADLS_ACUITY_SCORE: 33
ADLS_ACUITY_SCORE: 33

## 2024-09-30 NOTE — ED TRIAGE NOTES
Pt presents to ED with concerns about possible withdrawal from gabapentin. States she was on it for approx 4 months and her last dose Wednesday. Symptoms began on Friday. States she feels anxious, can't sit still and is nauseated. Denies pain.    Triage Assessment (Adult)       Row Name 09/30/24 1153          Triage Assessment    Airway WDL WDL        Respiratory WDL    Respiratory WDL WDL        Skin Circulation/Temperature WDL    Skin Circulation/Temperature WDL WDL                     
full weight-bearing

## 2024-09-30 NOTE — TELEPHONE ENCOUNTER
"Pt thinks she is having withdrawal from gabapentin. She is very anxious and breathing on phone is not normal.  Pt has nausea.  She can't calm down.  Advised to got to ER for evaluation now.      Reason for Disposition   Lightheadedness or dizziness and persists > 10 minutes and not relieved by reassurance provided by triager    Additional Information   Negative: SEVERE difficulty breathing (e.g., struggling for each breath, speaks in single words)   Negative: Bluish (or gray) lips or face   Negative: Difficult to awaken or acting confused (e.g., disoriented, slurred speech)   Negative: Hysterical or combative behavior   Negative: Sounds like a life-threatening emergency to the triager   Negative: Chest pain   Negative: Palpitations, skipped heartbeat, or rapid heartbeat is main symptom   Negative: Cough is main symptom   Negative: Suicide thoughts, threats, attempts, or questions   Negative: Depression is main problem or symptom (e.g., feelings of sadness or hopelessness)    Answer Assessment - Initial Assessment Questions  1. CONCERN: \"Did anything happen that prompted you to call today?\"       Stopped gabapentin last wed, did 5 day taper dose,  2. ANXIETY SYMPTOMS: \"Can you describe how you (your loved one; patient) have been feeling?\" (e.g., tense, restless, panicky, anxious, keyed up, overwhelmed, sense of impending doom).       Has anxiety and depression  3. ONSET: \"How long have you been feeling this way?\" (e.g., hours, days, weeks)      Sx started Friday morning  4. SEVERITY: \"How would you rate the level of anxiety?\" (e.g., 0 - 10; or mild, moderate, severe).      9/10  5. FUNCTIONAL IMPAIRMENT: \"How have these feelings affected your ability to do daily activities?\" \"Have you had more difficulty than usual doing your normal daily activities?\" (e.g., getting better, same, worse; self-care, school, work, interactions)      Having trouble doing normal activities  6. HISTORY: \"Have you felt this way before?\" " "\"Have you ever been diagnosed with an anxiety problem in the past?\" (e.g., generalized anxiety disorder, panic attacks, PTSD). If Yes, ask: \"How was this problem treated?\" (e.g., medicines, counseling, etc.)      Yes, on zoloft for 15 years, has appointment tomorrow to renew  7. RISK OF HARM - SUICIDAL IDEATION: \"Do you ever have thoughts of hurting or killing yourself?\" If Yes, ask:  \"Do you have these feelings now?\" \"Do you have a plan on how you would do this?\"      no  8. TREATMENT:  \"What has been done so far to treat this anxiety?\" (e.g., medicines, relaxation strategies). \"What has helped?\"      Nothing is helping   9. TREATMENT - THERAPIST: \"Do you have a counselor or therapist? Name?\"      no  10. POTENTIAL TRIGGERS: \"Do you drink caffeinated beverages (e.g., coffee, sonido, teas), and how much daily?\" \"Do you drink alcohol or use any drugs?\" \"Have you started any new medicines recently?\"        Thinks it is withdrawal  11. PATIENT SUPPORT: \"Who is with you now?\" \"Who do you live with?\" \"Do you have family or friends who you can talk to?\"           12. OTHER SYMPTOMS: \"Do you have any other symptoms?\" (e.g., feeling depressed, trouble concentrating, trouble sleeping, trouble breathing, palpitations or fast heartbeat, chest pain, sweating, nausea, or diarrhea)        Stomach upset, nausea, anxious, can't sit still  13. PREGNANCY: \"Is there any chance you are pregnant?\" \"When was your last menstrual period?\"        Not applicable.    Protocols used: Anxiety and Panic Attack-A-[place]    "

## 2024-10-01 ENCOUNTER — VIRTUAL VISIT (OUTPATIENT)
Dept: INTERNAL MEDICINE | Facility: CLINIC | Age: 69
End: 2024-10-01
Payer: COMMERCIAL

## 2024-10-01 ENCOUNTER — PATIENT OUTREACH (OUTPATIENT)
Dept: INTERNAL MEDICINE | Facility: CLINIC | Age: 69
End: 2024-10-01

## 2024-10-01 DIAGNOSIS — F32.5 MAJOR DEPRESSIVE DISORDER WITH SINGLE EPISODE, IN FULL REMISSION (H): ICD-10-CM

## 2024-10-01 DIAGNOSIS — F41.9 ANXIETY: ICD-10-CM

## 2024-10-01 PROCEDURE — 99213 OFFICE O/P EST LOW 20 MIN: CPT | Mod: 95 | Performed by: INTERNAL MEDICINE

## 2024-10-01 RX ORDER — COVID-19 VACCINE, MRNA 50 UG/.5ML
INJECTION, SUSPENSION INTRAMUSCULAR
COMMUNITY
Start: 2023-10-18

## 2024-10-01 RX ORDER — SERTRALINE HYDROCHLORIDE 100 MG/1
100 TABLET, FILM COATED ORAL DAILY
Qty: 90 TABLET | Refills: 0 | Status: SHIPPED | OUTPATIENT
Start: 2024-10-01

## 2024-10-01 RX ORDER — FAMOTIDINE 10 MG
TABLET ORAL
COMMUNITY

## 2024-10-01 ASSESSMENT — PATIENT HEALTH QUESTIONNAIRE - PHQ9
10. IF YOU CHECKED OFF ANY PROBLEMS, HOW DIFFICULT HAVE THESE PROBLEMS MADE IT FOR YOU TO DO YOUR WORK, TAKE CARE OF THINGS AT HOME, OR GET ALONG WITH OTHER PEOPLE: SOMEWHAT DIFFICULT
SUM OF ALL RESPONSES TO PHQ QUESTIONS 1-9: 5
SUM OF ALL RESPONSES TO PHQ QUESTIONS 1-9: 5

## 2024-10-01 NOTE — TELEPHONE ENCOUNTER
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 Symptoms improved just slightly, still very anxious.  How are your symptoms? (Red Flag symptoms escalate to triage hotline per guidelines): Improved  Do you know how to contact your clinic care team if you have future questions or changes to your health status? : Yes  Does the patient have their discharge instructions? : Yes  Does the patient have questions regarding their discharge instructions? : No  Were you started on any new medications or were there changes to any of your previous medications? : Yes  Does the patient have all of their medications?: Yes  Do you have questions regarding any of your medications? : No  Do you have all of your needed medical supplies or equipment (DME)?  (i.e. oxygen tank, CPAP, cane, etc.): Yes    Follow up Plan     Discharge Follow-Up  Discharge follow up appointment scheduled in alignment with recommended follow up timeframe or Transitions of Risk Category? (Low = within 30 days; Moderate= within 14 days; High= within 7 days): Yes  Discharge Follow Up Appointment Date: 10/01/24  Discharge Follow Up Appointment Scheduled with?: Primary Care Provider    Future Appointments   Date Time Provider Department Center   10/1/2024  5:00 PM Js Marrero MD [facility]   11/25/2024 11:00 AM Js Marrero MD [facility]       Outpatient Plan as outlined on AVS reviewed with patient.    For any urgent concerns, please contact our 24 hour nurse triage line: [phone]  ([phone])       Noris Khan RN

## 2024-10-01 NOTE — PROGRESS NOTES
Whitney is a 69 year old who is being evaluated via a billable video visit.    How would you like to obtain your AVS? MyChart  If the video visit is dropped, the invitation should be resent by: Text to cell phone: [phone]  Will anyone else be joining your video visit? No      Assessment & Plan         (F41.9) Anxiety  Plan: Anxiety symptoms much better after restarting gabapentin, refilled sertraline (ZOLOFT) 100 MG tablet 1 tablet daily as directed.explained clearly about the medication,insructions and side effects.  If anxiety symptoms worsen or persist then we can increase sertraline dose, patient understands.       (F32.5) Major depressive disorder with single episode, in full remission (H)  Plan: sertraline (ZOLOFT) 100 MG tablet refilled as directed.explained clearly about the medication,insructions and side effects. Call or return to clinic prn if these symtoms worsen, fail to improve as anticipated, or if new symptoms develop.       Subjective   Whitney is a 69 year old, presenting for the following health issues:  Recheck Medication (Sertraline.Weaing off Gabapentin.)        10/1/2024     1:18 PM   Additional Questions   Roomed by Chadwick Aranda MA   Accompanied by Self         10/1/2024     1:18 PM   Patient Reported Additional Medications   Patient reports taking the following new medications no     Video Start Time: 1:52 PM    History of Present Illness       Mental Health Follow-up:  Patient presents to follow-up on Depression & Anxiety.Patient's depression since last visit has been:  Worse  The patient is not having other symptoms associated with depression.  Patient's anxiety since last visit has been:  Worse  The patient is not having other symptoms associated with anxiety.  Any significant life events: No  Patient is feeling anxious or having panic attacks.  Patient has no concerns about alcohol or drug use.    She eats 2-3 servings of fruits and vegetables daily.She consumes 1 sweetened beverage(s)  "daily.She exercises with enough effort to increase her heart rate 30 to 60 minutes per day.  She exercises with enough effort to increase her heart rate 5 days per week.   She is taking medications regularly.     Pt is a 69 year old female who is seen here to day to follow up from ER , withdrawal from Gabapentin, had panic attacks,restless, .     Depression and Anxiety   How are you doing with your depression since your last visit? Anxiety and depression worsened after stopping gabapentin but patient has started back on tapering dose of gabapentin and feeling better  How are you doing with your anxiety since your last visit?  Feeling better after restarting gabapentin  Are you having other symptoms that might be associated with depression or anxiety? No  Have you had a significant life event? No   Do you have any concerns with your use of alcohol or other drugs? No        4/4/2024     7:26 AM 10/1/2024     1:21 PM 10/1/2024     1:30 PM   PHQ   PHQ-9 Total Score 0 6 5   Q9: Thoughts of better off dead/self-harm past 2 weeks Not at all Not at all Not at all         11/18/2022    10:12 AM 11/22/2023    10:40 AM 9/29/2024    12:58 PM   OSCAR-7 SCORE   Total Score 0 (minimal anxiety) 0 (minimal anxiety) 5 (mild anxiety)   Total Score 0 0 5           Review of Systems  RESP: NEGATIVE for significant cough or SOB  CV: NEGATIVE for chest pain, palpitations or peripheral edema  PSYCHIATRIC: NEGATIVE for changes in mood or affect      Objective    Vitals - Patient Reported  Weight (Patient Reported): 60.8 kg (134 lb)  Height (Patient Reported): 168.9 cm (5' 6.5\")  BMI (Based on Pt Reported Ht/Wt): 21.3  Pain Score: No Pain (0)        Physical Exam   GENERAL: alert and no distress  EYES: Eyes grossly normal to inspection.  No discharge or erythema, or obvious scleral/conjunctival abnormalities.  RESP: No audible wheeze, cough, or visible cyanosis.    PSYCH: Appropriate affect, tone, and pace of words          Video-Visit " Details    Type of service:  Video Visit   Video End Time:2:02 PM  Originating Location (pt. Location): Home    Distant Location (provider location):  On-site  Platform used for Video Visit: Phong  Signed Electronically by: Js Marrero MD

## 2024-10-25 ENCOUNTER — NURSE TRIAGE (OUTPATIENT)
Dept: INTERNAL MEDICINE | Facility: CLINIC | Age: 69
End: 2024-10-25
Payer: COMMERCIAL

## 2024-10-25 ENCOUNTER — HOSPITAL ENCOUNTER (EMERGENCY)
Facility: CLINIC | Age: 69
Discharge: HOME | End: 2024-10-25
Attending: EMERGENCY MEDICINE | Admitting: EMERGENCY MEDICINE
Payer: COMMERCIAL

## 2024-10-25 VITALS
HEART RATE: 89 BPM | SYSTOLIC BLOOD PRESSURE: 140 MMHG | DIASTOLIC BLOOD PRESSURE: 75 MMHG | TEMPERATURE: 98.1 F | BODY MASS INDEX: 22.18 KG/M2 | RESPIRATION RATE: 18 BRPM | WEIGHT: 138.01 LBS | HEIGHT: 66 IN | OXYGEN SATURATION: 97 %

## 2024-10-25 DIAGNOSIS — F41.1 ANXIETY REACTION: ICD-10-CM

## 2024-10-25 DIAGNOSIS — T42.6X5A ADVERSE EFFECT OF GABAPENTIN, INITIAL ENCOUNTER: ICD-10-CM

## 2024-10-25 PROBLEM — F33.0 MDD (MAJOR DEPRESSIVE DISORDER), RECURRENT EPISODE, MILD (H): Status: ACTIVE | Noted: 2024-10-25

## 2024-10-25 PROCEDURE — 250N000013 HC RX MED GY IP 250 OP 250 PS 637: Performed by: EMERGENCY MEDICINE

## 2024-10-25 PROCEDURE — 99283 EMERGENCY DEPT VISIT LOW MDM: CPT

## 2024-10-25 RX ORDER — OLANZAPINE 5 MG/1
5 TABLET, ORALLY DISINTEGRATING ORAL ONCE
Status: COMPLETED | OUTPATIENT
Start: 2024-10-25 | End: 2024-10-25

## 2024-10-25 RX ORDER — OLANZAPINE 5 MG/1
5 TABLET, ORALLY DISINTEGRATING ORAL AT BEDTIME
Qty: 20 TABLET | Refills: 0 | Status: SHIPPED | OUTPATIENT
Start: 2024-10-25

## 2024-10-25 RX ADMIN — OLANZAPINE 5 MG: 5 TABLET, ORALLY DISINTEGRATING ORAL at 18:09

## 2024-10-25 ASSESSMENT — ACTIVITIES OF DAILY LIVING (ADL)
ADLS_ACUITY_SCORE: 0
ADLS_ACUITY_SCORE: 0

## 2024-10-25 NOTE — CONSULTS
Diagnostic Evaluation Consultation  Crisis Assessment    Patient Name: Yessi Claire  Age:  69 year old  Legal Sex: female  Gender Identity: female  Pronouns:   Race: White  Ethnicity: Not  or   Language: English      Patient was assessed: Virtual: [employer]   Crisis Assessment Start Date: 10/25/24  Crisis Assessment Start Time: 1718  Crisis Assessment Stop Time: 1752  Patient location: [facility] EMERGENCY DEPT                                 Referral Data and Chief Complaint  Yessi Claire presents to the ED with family/friends. Patient is presenting to the ED for the following concerns: Depression, Significant behavioral change, Anxiety.   Factors that make the mental health crisis life threatening or complex are:  Pt has been working with her current outpatient PCP to taper off on her Gabapentin.  However, Pt having Gabapentin withdrawal issues as her anxiety has gotten worse, difficulty sleeping and eating lately.  Pt was seen in the ER on 9/30/24 with Gabapentin withdrawal concerns.  Pt has no current outpatient mental health service providers..      Informed Consent and Assessment Methods  Explained the crisis assessment process, including applicable information disclosures and limits to confidentiality, assessed understanding of the process, and obtained consent to proceed with the assessment.  Assessment methods included conducting a formal interview with patient, review of medical records, collaboration with medical staff, and obtaining relevant collateral information from family and community providers when available.  : done     Patient response to interventions: eager to participate, acceptance expressed, verbalizes understanding  Coping skills were attempted to reduce the crisis:  deep breathing exercise, meditation, taking walks, hiking, gardening     History of the Crisis   Pt is a 69 year old  female with history of depression and anxiety.  Pt was brought to  "the ER today by her  due to worsening of anxiety and Gabapentin withdrawal concerns.  Pt remarked, \"I've been trying to get off Gabapentin but going through withdrawal, they gave me new tapering schedule, this week on Monday, my anxiety got worse, I can't sit still, having crying spells and can't eat.\" as her reason for visiting the ER today.  Pt shared prescribed with 300mg Gabapentin 3x daily in June, 2024 by her orthopeic clinic to manage her sciatica pain.  Pt reported she did not know Gabapentin can be addictive.  Pt noted Gabapentin was not effective managing her sciatica pain, so she decided to get off.  Pt reported that a few days ago, she was switched from 200 mg gabapentin twice per day, to 100 mg of gabapentin twice per day.  Besides having Gabapentin withdrawal concerns and increased anxiety, Pt did not identify any other triggers to her current mental health crisis.  Pt endorsed baseline depression but increased anxiety.  Pt reported having poor sleep and low appetite.  Pt denied having acute psychosis and spencer.  Pt denied having suicidal and homicidal ideations.  Pt denied having access to firearms, history of SIB and previous suicide attempt.    Brief Psychosocial History  Family:  , Children yes  Support System:     Employment Status:  retired, unemployed  Source of Income:  unable to assess  Financial Environmental Concerns:  none, unemployed  Current Hobbies:  arts/crafts, reading, social media/computer activities, television/movies/videos, meditation, exercise/fitness, outdoor activities, care of plants, home improvement, gardening, group/social activities, family functions  Barriers in Personal Life:  behavioral concerns, mental health concerns, lack of motivation, emotional concerns    Significant Clinical History  Current Anxiety Symptoms:  panic attack, anxious  Current Depression/Trauma:  apathy, crying or feels like crying, helplessness, sadness, impaired decision " making  Current Somatic Symptoms:  anxious  Current Psychosis/Thought Disturbance:     Current Eating Symptoms:  loss of appetite  Chemical Use History:  Alcohol: None  Benzodiazepines: None  Opiates: None  Cocaine: None  Marijuana: None  Other Use: None  Withdrawal Symptoms: Tremors, Other (comments) (anxiety, difficulty sleeping and eating.)   Past diagnosis:  Anxiety Disorder, Depression  Family history:  Substance Use Disorder, Anxiety Disorder  Past treatment:  Primary Care, Psychiatric Medication Management  Details of most recent treatment:  Pt reported no recent treatment.  Pt reported she has no current outpatient mental health service providers.  Other relevant history:  Pt shared she had 3 siblings but one passed away.  Pt reported she was , lived with her  and they have 2 children.  Pt reported she was retired.  Pt denied having medical conditions and history of legal issues. Pt also denied history of being abused.       Collateral Information  Is there collateral information: Yes     Collateral information name, relationship, phone number:  , Daljit Claire [phone]    What happened today: Daljit reported Pt has been struggling with her Gabapentin withdrawal issues and she was having anxiety through the roof today.     What is different about patient's functioning: Daljit reported Pt was having hard time walking, seeping and eating lately.     Concern about alcohol/drug use:      What do you think the patient needs:      Has patient made comments about wanting to kill themselves/others: no    If d/c is recommended, can they take part in safety/aftercare planning:  yes    Additional collateral information:        Risk Assessment  [place] Suicide Severity Rating Scale Full Clinical Version:  Suicidal Ideation  Q1 Wish to be Dead (Lifetime): No  Q2 Non-Specific Active Suicidal Thoughts (Lifetime): No  Q6 Suicide Behavior (Lifetime): no     Suicidal Behavior (Lifetime)  Actual  Attempt (Lifetime): No  Has subject engaged in non-suicidal self-injurious behavior? (Lifetime): No  Interrupted Attempts (Lifetime): No  Aborted or Self-Interrupted Attempt (Lifetime): No  Preparatory Acts or Behavior (Lifetime): No    [place] Suicide Severity Rating Scale Recent:   Suicidal Ideation (Recent)  Q1 Wished to be Dead (Past Month): no  Q2 Suicidal Thoughts (Past Month): no  Level of Risk per Screen: no risks indicated     Suicidal Behavior (Recent)  Actual Attempt (Past 3 Months): No  Has subject engaged in non-suicidal self-injurious behavior? (Past 3 Months): No  Interrupted Attempts (Past 3 Months): No  Aborted or Self-Interrupted Attempt (Past 3 Months): No  Preparatory Acts or Behavior (Past 3 Months): No    Environmental or Psychosocial Events: helplessness/hopelessness, other life stressors, recent life events (see comment)  Protective Factors: Protective Factors: lives in a responsibly safe and stable environment, intact marriage or domestic partnership, help seeking, sense of importance of health and wellness, constructive use of leisure time, enjoyable activities, resilience, reality testing ability    Does the patient have thoughts of harming others? Feels Like Hurting Others: no  Previous Attempt to Hurt Others: no  Current presentation:  (Pt was anxious, but alert, oriented, engaged and cooperative.)  Is the patient engaging in sexually inappropriate behavior?: no    Is the patient engaging in sexually inappropriate behavior?  no        Mental Status Exam   Affect: Constricted  Appearance: Appropriate  Attention Span/Concentration: Attentive  Eye Contact: Engaged    Fund of Knowledge: Appropriate   Language /Speech Content: Fluent  Language /Speech Volume: Normal  Language /Speech Rate/Productions: Normal  Recent Memory: Variable  Remote Memory: Variable  Mood: Anxious, Depressed  Orientation to Person: Yes   Orientation to Place: Yes  Orientation to Time of Day: Yes  Orientation to Date:  Yes     Situation (Do they understand why they are here?): Yes  Psychomotor Behavior: Normal  Thought Content: Clear  Thought Form: Intact     Mini-Cog Assessment  Number of Words Recalled:    Clock-Drawing Test: 2 Normal   Three Item Recall: 3 objects recalled  Mini-Cog Total Score: 5     Medication  Psychotropic medications:   Medication Orders - Psychiatric (From admission, onward)      Start     Dose/Rate Route Frequency Ordered Stop    10/25/24 0000  OLANZapine zydis (ZYPREXA) 5 MG ODT         5 mg Oral AT BEDTIME 10/25/24 1825               Current Care Team  Patient Care Team:  Js Marrero MD as PCP - General  Js Marrero MD as Assigned PCP    Diagnosis  Patient Active Problem List   Diagnosis Code    Acquired hypothyroidism E03.9    Major depressive disorder with single episode, in full remission (H) F32.5    Low back pain M54.50    Hashimoto's thyroiditis E06.3    Hyperlipidemia LDL goal <130 E78.5    Anxiety F41.9    OSCAR (generalized anxiety disorder) F41.1    MDD (major depressive disorder), recurrent episode, mild (H) F33.0       Primary Problem This Admission  Active Hospital Problems    OSCAR (generalized anxiety disorder)      MDD (major depressive disorder), recurrent episode, mild (H)        Clinical Summary and Substantiation of Recommendations   Pt presenting in the ER today due to worsening of anxiety and Gabapentin withdrawal concerns.  Pt shared she was prescribed with 300mg Gabapentin 3x daily in June, 2024 by her orthopeic clinic to manage her sciatica pain. Pt reported she did not know Gabapentin can be addictive. Pt noted Gabapentin was not effective managing her sciatica pain, so she decided to get off. Pt reported that a few days ago, she was switched from 200 mg gabapentin twice per day, to 100 mg of gabapentin twice per day. Besides having Gabapentin withdrawal concerns and increased anxiety, Pt did not identify any other triggers to her current mental health  crisis. Pt endorsed baseline depression but increased anxiety. Pt reported having poor sleep and low appetite. Pt denied having acute psychosis and psencer. Pt denied having suicidal and homicidal ideations. Pt denied having access to firearms, history of SIB and previous suicide attempt.  Pt was able to engage in her DEC Aftercare plan as she felt safe to return home.  Pt was able to identify her coping skills and support system to mitigate her current mental health crisis.  Pt was not imminent danger to herself or to others.  Pt was appropriate for outpatient mental health services.                          Patient coping skills attempted to reduce the crisis:  deep breathing exercise, meditation, taking walks, hiking, gardening    Disposition  Recommended disposition: Individual Therapy, Medication Management        Reviewed case and recommendations with attending provider. Attending Name: Amandeep Irizarry MD       Attending concurs with disposition: yes       Patient and/or validated legal guardian concurs with disposition:   yes       Final disposition:  discharge    Legal status on admission:      Assessment Details   Total duration spent with the patient: 34 min     CPT code(s) utilized: 18182 - Psychotherapy for Crisis - 60 (30-74*) min    Shabbir Rossi [facility], Psychotherapist  DEC - Triage & Transition Services  Callback: [phone]

## 2024-10-25 NOTE — TELEPHONE ENCOUNTER
Called and spoke with patient.     Advised of providers recommendation.  She states ortho won't do anything. ER was last to prescribe. Wondering how long to do 100 mg TID. Virtual visit needed?    Routing to provider to review and advise.     JARROD MANUEL RN on 10/25/2024 at 11:23 AM   [facility]

## 2024-10-25 NOTE — TELEPHONE ENCOUNTER
"Patient calls.     States she is going through gabapentin withdrawal again.   Happened on 9/30, went to ER. They started new slower tapering regimen.     Tapering from 900 mg/day to 200 mg/day    On Monday 10/21 went from 400mg/day to 200 mg/day.     Sx started on Tuesday and getting worse. Having anxiety, can't eat, can't sit still, \"anxiety is through the roof\". Patient is crying/sobbing. Sitting down. Denies chest pain or sob.     On gabapentin for sciatica - needed to do PT and gabapentin. Was on  300 mg TID middle of June to middle September. Advised to go off it. Weaned off it. But then went into withdrawal so lloyd tot ED on 9/30. Started me on a slower weaning schedule. Every Monday changed dose.  Restarted at 300 mg TID  Then 300 mg BID  Then 200 mg BID  End of that 200 mg starting to feel a little anxious  Saturday and Sunday am was ok.   Monday started 100 mg BID  Plan was to stop on Sunday.     100 mg BID through Sunday. Have 9 pills left. Was going to go to 1/day. Because she's afraid to stop.     Routing to provider to review and advise. Patient wondering if she can go back up in dosing.     JARROD MANUEL RN on 10/25/2024 at 10:47 AM   [facility]         Reason for Disposition   Caller has URGENT medicine question about med that PCP or specialist prescribed and triager unable to answer question    Protocols used: Medication Question Call-A-OH    "

## 2024-10-25 NOTE — ED PROVIDER NOTES
"  Emergency Department Note      History of Present Illness     Chief Complaint   Withdrawal    HPI   Yessi Claire is a 69 year old female with a history as noted below who presents to the emergency department for withdrawal. The patient states that in June of 2024, she began taking 300 mg of gabapentin 3 times per day for her hx of sciatica. She notes that within the past month, she has been tapered off of this gabapentin, noting that she was recently decreased to 200 mg, twice per day. She reports that a few days ago, she was switched from 200 mg gabapentin twice per day, to 100 mg of gabapentin twice per day and has been experiencing increased anxiety and has since been unable to eat. She is concerned that she is experiencing gabapentin withdrawal.    Independent Historian   None    Review of External Notes   Labs from 09/30/24 reviewed.     Past Medical History     Medical History and Problem List   GERD  Hashimoto's thyroiditis  Hypothyroidism  IBS  Chronic low back pain with sciatica  Lyme meningitis  Radiculopathy  Major depression  Uterine fibroid    Medications   ASPIRIN 81 MG OR TABS  famotidine (PEPCID) 10 MG tablet  gabapentin (NEURONTIN) 100 MG capsule  levothyroxine (SYNTHROID/LEVOTHROID) 75 MCG tablet  methylPREDNISolone (MEDROL DOSEPAK) 4 MG tablet therapy pack  Naproxen Sodium (ALEVE PO)  rosuvastatin (CRESTOR) 10 MG tablet  sertraline (ZOLOFT) 100 MG tablet  SPIKEVAX 50 MCG/0.5ML injection    Physical Exam     Patient Vitals for the past 24 hrs:   BP Temp Pulse Resp SpO2 Height Weight   10/25/24 1312 (!) 147/76 98.1  F (36.7  C) 96 18 98 % 1.676 m (5' 6\") 62.6 kg (138 lb 0.1 oz)     Physical Exam  Vitals reviewed.   HENT:      Right Ear: Tympanic membrane normal.      Left Ear: Tympanic membrane normal.   Cardiovascular:      Rate and Rhythm: Normal rate and regular rhythm.   Pulmonary:      Effort: Pulmonary effort is normal.   Abdominal:      General: Abdomen is flat. Bowel sounds are " normal.   Musculoskeletal:         General: Normal range of motion.   Skin:     General: Skin is warm.      Capillary Refill: Capillary refill takes less than 2 seconds.   Neurological:      General: No focal deficit present.      Mental Status: She is alert and oriented to person, place, and time.      Comments: Tremulous resolves with intention   Psychiatric:      Comments: Anxious tearful worried about withdrawal from gabapentin.  Is now wanting on 100 of gabapentin.  Denies suicidal or homicidal ideation.           Diagnostics     Lab Results   None    Imaging   None    Independent Interpretation   None    ED Course      Medications Administered   Medications   OLANZapine zydis (zyPREXA) ODT tab 5 mg (5 mg Oral $Given 10/25/24 1809)     Discussion of Management   ED Mental Health, Shabbir Rossi    ED Course   ED Course as of 10/25/24 1826   Fri Oct 25, 2024   1625 I obtained history and examined the patient as noted above.      1805 Spoke with DEC, Shabbir Rossi, regarding the patient's presentation to the emergency department.      1819 I rechecked the patient. I discussed findings and discharge with the patient. All questions answered.        Additional Documentation  None    Medical Decision Making / Diagnosis     CMS Diagnoses: None    MIPS   None    MDM   Yessi Claire is a 69 year old female who presents with ongoing concerns about gabapentin withdrawal was seen here a month ago and given a very extensive taper.  Do not feel comfortable with adding gabapentin back to her medication list patient is on an appropriate long taper over a month and is now only down to 100 mg.  Recommended a DEC assessment to discuss anxiety triggered by medication use and chronic anxiety.  Offered an oral dose of Zyprexa in the emergency room.  Will continue this as needed and outpatient appointments were made by the clinical psychologist.  Patient is not at risk to harm self or others and was discharged home in stable  condition.  Lab work was considered but due to patient's recent visit a month ago with normal lab work and symptoms ongoing from gabapentin and high anxiety did not recommend further workup at this time.    Disposition   The patient was discharged.     Diagnosis     ICD-10-CM    1. Anxiety reaction  F41.1       2. Adverse effect of gabapentin, initial encounter  T42.6X5A         Discharge Medications   New Prescriptions    OLANZAPINE ZYDIS (ZYPREXA) 5 MG ODT    Take 1 tablet (5 mg) by mouth at bedtime.     Scribe Disclosure:  I, Darrick Merino, am serving as a scribe at 4:28 PM on 10/25/2024 to document services personally performed by Amandeep Irizarry MD based on my observations and the provider's statements to me.        Amandeep Irizarry MD  10/27/24 8040

## 2024-10-25 NOTE — DISCHARGE INSTRUCTIONS
Writer encourage Pt to take her medications as consistently as prescribed and keep all of scheduled appointments with her outpatient service providers.  Writer recommended Pt to engage in new outpatient psychiatry for medication management and individual therapy service to improve coping skills.  DEC coordinator will contact Pt within next 1 or 2 business days to ensure coordination of care and provide assistance with appointments.    Below is a list of FREE Mental Health Options in the [facility] Area:    [facility] ([facility])  Serves those in emotional crisis with 24-hour, seven-day-a-week crisis counseling, assessment, referral, and medication management.   Suicidal: [phone] Consultation: [phone]  [address] [place] [address], 24/7 Crisis Intervention Center     [facility]  [phone]  Serves those in need of free outpatient mental health care  Hours: Mon, Wed, Fri 1-3pm; Mon-Thurs 6:30-8:30pm    [facility]  [address] [place] [id number]  [phone]       Date: Monday, 11/4/2024  Time: 12:00 pm - 1:00 pm  Provider: Olga Arizmendi  MS  LMFT  Location: Clinical and Developmental Services [facility], [address] [place] [address], [address], [place] [address]  Phone: [phone]  Type: Teletherapy

## 2024-10-25 NOTE — ED TRIAGE NOTES
Patient states she is going through withdrawal from Gabapentin.  Patient states she is being tapered off by her primary care provider.  Patient states she is having increased anxiety and states she is unable to eat.       Triage Assessment (Adult)       Row Name 10/25/24 1311          Triage Assessment    Airway WDL WDL        Respiratory WDL    Respiratory WDL WDL        Skin Circulation/Temperature WDL    Skin Circulation/Temperature WDL WDL        Cardiac WDL    Cardiac WDL WDL        Peripheral/Neurovascular WDL    Peripheral Neurovascular WDL WDL        Cognitive/Neuro/Behavioral WDL    Cognitive/Neuro/Behavioral WDL WDL

## 2024-10-25 NOTE — TELEPHONE ENCOUNTER
Ok to increase gabapentin to 100 mg 1 tab-  3 x daily , please advise pt to follow up with her orthopedics  for plan of action .

## 2024-10-28 ENCOUNTER — TELEPHONE (OUTPATIENT)
Dept: BEHAVIORAL HEALTH | Facility: CLINIC | Age: 69
End: 2024-10-28
Payer: COMMERCIAL

## 2024-10-28 ASSESSMENT — ANXIETY QUESTIONNAIRES
5. BEING SO RESTLESS THAT IT IS HARD TO SIT STILL: SEVERAL DAYS
1. FEELING NERVOUS, ANXIOUS, OR ON EDGE: MORE THAN HALF THE DAYS
GAD7 TOTAL SCORE: 7
3. WORRYING TOO MUCH ABOUT DIFFERENT THINGS: NOT AT ALL
GAD7 TOTAL SCORE: 7
7. FEELING AFRAID AS IF SOMETHING AWFUL MIGHT HAPPEN: NOT AT ALL
GAD7 TOTAL SCORE: 7
7. FEELING AFRAID AS IF SOMETHING AWFUL MIGHT HAPPEN: NOT AT ALL
2. NOT BEING ABLE TO STOP OR CONTROL WORRYING: MORE THAN HALF THE DAYS
8. IF YOU CHECKED OFF ANY PROBLEMS, HOW DIFFICULT HAVE THESE MADE IT FOR YOU TO DO YOUR WORK, TAKE CARE OF THINGS AT HOME, OR GET ALONG WITH OTHER PEOPLE?: SOMEWHAT DIFFICULT
IF YOU CHECKED OFF ANY PROBLEMS ON THIS QUESTIONNAIRE, HOW DIFFICULT HAVE THESE PROBLEMS MADE IT FOR YOU TO DO YOUR WORK, TAKE CARE OF THINGS AT HOME, OR GET ALONG WITH OTHER PEOPLE: SOMEWHAT DIFFICULT
4. TROUBLE RELAXING: MORE THAN HALF THE DAYS
6. BECOMING EASILY ANNOYED OR IRRITABLE: NOT AT ALL

## 2024-10-28 NOTE — TELEPHONE ENCOUNTER
Triage and Transition Services- Patient Follow Up Call  Service Line Making Phone Call: Telemedicine    Who did Writer Talk to: Patient    Details of Call: Patient reported doing well and is set up to see PCP tomorrow, would like to consult with PCP before taking next steps re: therapy scheduling. Will call back for assistance, of note the appt scheduled via CC does not accept patient's current insurance.    MICHELLE CERDA 10/28/2024 4:48 PM

## 2024-10-29 ENCOUNTER — MYC MEDICAL ADVICE (OUTPATIENT)
Dept: INTERNAL MEDICINE | Facility: CLINIC | Age: 69
End: 2024-10-29

## 2024-10-29 ENCOUNTER — VIRTUAL VISIT (OUTPATIENT)
Dept: INTERNAL MEDICINE | Facility: CLINIC | Age: 69
End: 2024-10-29
Payer: COMMERCIAL

## 2024-10-29 ENCOUNTER — TELEPHONE (OUTPATIENT)
Dept: INTERNAL MEDICINE | Facility: CLINIC | Age: 69
End: 2024-10-29

## 2024-10-29 DIAGNOSIS — F41.9 ANXIETY: Primary | ICD-10-CM

## 2024-10-29 DIAGNOSIS — F32.5 MAJOR DEPRESSIVE DISORDER WITH SINGLE EPISODE, IN FULL REMISSION (H): ICD-10-CM

## 2024-10-29 PROCEDURE — 99213 OFFICE O/P EST LOW 20 MIN: CPT | Mod: 95 | Performed by: INTERNAL MEDICINE

## 2024-10-29 PROCEDURE — G2211 COMPLEX E/M VISIT ADD ON: HCPCS | Mod: 95 | Performed by: INTERNAL MEDICINE

## 2024-10-29 RX ORDER — HYDROXYZINE HYDROCHLORIDE 25 MG/1
25 TABLET, FILM COATED ORAL 2 TIMES DAILY PRN
Qty: 30 TABLET | Refills: 0 | Status: SHIPPED | OUTPATIENT
Start: 2024-10-29

## 2024-10-29 RX ORDER — SERTRALINE HYDROCHLORIDE 100 MG/1
100 TABLET, FILM COATED ORAL DAILY
Qty: 90 TABLET | Refills: 0 | Status: SHIPPED | OUTPATIENT
Start: 2024-10-29

## 2024-10-29 ASSESSMENT — PATIENT HEALTH QUESTIONNAIRE - PHQ9: SUM OF ALL RESPONSES TO PHQ QUESTIONS 1-9: 4

## 2024-10-29 NOTE — TELEPHONE ENCOUNTER
Prior authorization was done on 10/29/2024. Prior authorization denial sent to primary care provider to review.    Thank you,  Kailash Christianson, Triage RN [facility]  12:33 PM 10/29/2024

## 2024-10-29 NOTE — TELEPHONE ENCOUNTER
Please see denial. Patient sends MyChart that she would like to appeal.    Thank you,  Kailash Christianson, Triage RN [facility]  12:33 PM 10/29/2024

## 2024-10-29 NOTE — TELEPHONE ENCOUNTER
[facility] Prior Authorization Team   Phone: [phone]    PRIOR AUTHORIZATION DENIED    Medication: HYDROXYZINE HCL 25 MG PO TABS  Insurance Company: [employer] - Phone [phone] Fax [phone]  Denial Date: 10/29/2024  Denial Reason(s): MUST TRY/FAIL TWO FDA APPROVED PRODUCTS FOR THE CONDITION       Appeal Information: IF THE PROVIDER WOULD LIKE TO APPEAL THIS DECISION PLEASE PROVIDE THE PA TEAM WITH A LETTER OF MEDICAL NECESSITY      Patient Notified: NO

## 2024-10-29 NOTE — PROGRESS NOTES
Whitney is a 69 year old who is being evaluated via a billable video visit.    How would you like to obtain your AVS? MyChart  If the video visit is dropped, the invitation should be resent by: Text to cell phone: [phone]  Will anyone else be joining your video visit? No      Assessment & Plan        (F41.9) Anxiety     Plan: Patient feels anxious as she is tapering down gabapentin, on sertraline (ZOLOFT) 100 MG tablet daily,  started on hydrOXYzine HCl (ATARAX) 25 MG tablet, as directed.explained clearly about the medication,insructions and side effects. Advised not to drive or operate any machinery while on this med   Call or return to clinic prn if these symtoms worsen, fail to improve as anticipated, or if new symptoms develop.      (F32.5) Major depressive disorder with single episode, in full remission (H)  Comment: stable   Plan: sertraline (ZOLOFT) 100 MG tablet refilled.explained clearly about the medication,insructions and side effects.           MED REC REQUIRED  Post Medication Reconciliation Status:  Discharge medications reconciled and changed, see notes/orders      Prescription drug management  20 minutes spent by me on the date of the encounter doing chart review, history and exam, documentation and further activities per the note      Subjective   Whitney is a 69 year old, presenting for the following health issues:  ER F/U (Anxiety)      10/29/2024    10:33 AM   Additional Questions   Roomed by jason   Accompanied by self         10/29/2024    10:33 AM   Patient Reported Additional Medications   Patient reports taking the following new medications none     Video Start Time: 11:31 AM    History of Present Illness       Mental Health Follow-up:  Patient presents to follow-up on Anxiety.    Patient's anxiety since last visit has been:  Medium  The patient is having other symptoms associated with anxiety.  Any significant life events: No  Patient is feeling anxious or having panic attacks.  Patient  has no concerns about alcohol or drug use.She consumes 1 sweetened beverage(s) daily.She exercises with enough effort to increase her heart rate 30 to 60 minutes per day.  She exercises with enough effort to increase her heart rate 5 days per week.   She is taking medications regularly.       ED/UC Followup:    Facility:  [facility]  Date of visit: 10/25/24  Reason for visit: Anxiety/reaction  Current Status: Still having anxiety    Pt is a 69 year old female who presents to [facility] visit today to follow on ER visit.  Patient was seen in ER on 10/25/2024 for anxiety reaction while she was tapering off gabapentin.  Patient was given prescription for Zyprexa 5 mg in ER, patient has taken only 1 tablet since ER discharge.  Patient is currently taking gabapentin 100 mg daily this week.  Patient states anxiety is much better but still gets occasional anxiety attacks      Depression and Anxiety   How are you doing with your depression since your last visit? No change  How are you doing with your anxiety since your last visit?  Worsened since tapering off gabapentin   Are you having other symptoms that might be associated with depression or anxiety? Yes:  panic  Have you had a significant life event? No   Do you have any concerns with your use of alcohol or other drugs? No        10/1/2024     1:21 PM 10/1/2024     1:30 PM 10/29/2024    11:38 AM   PHQ   PHQ-9 Total Score 6 5 4   Q9: Thoughts of better off dead/self-harm past 2 weeks Not at all Not at all  Not at all       Patient-reported         11/22/2023    10:40 AM 9/29/2024    12:58 PM 10/28/2024     2:12 PM   OSCAR-7 SCORE   Total Score 0 (minimal anxiety) 5 (mild anxiety) 7 (mild anxiety)   Total Score 0 5 7        Patient-reported       Past Medical History:   Diagnosis Date    Abnormal Pap smear 01/01/1991    colposcopy    GERD (gastroesophageal reflux disease)     Hashimoto's thyroiditis     biopsy left nodule    Hypothyroidism     IBS (irritable bowel syndrome)      Low back pain     episodic: MRI mild degenerative changes 2012    Lyme meningitis     presented with facial weakness    Major depression 05/01/2012    Uterine fibroid          Current Outpatient Medications   Medication Sig Dispense Refill    ASPIRIN 81 MG OR TABS Every 3rd day      CALCIUM-VITAMIN D PO Take by mouth daily      famotidine (PEPCID) 10 MG tablet       famotidine (PEPCID) 10 MG tablet Take 10 mg by mouth daily      fish oil-omega-3 fatty acids 500 MG capsule       levothyroxine (SYNTHROID/LEVOTHROID) 75 MCG tablet Take 1 tablet (75 mcg) by mouth daily 90 tablet 3    Naproxen Sodium (ALEVE PO) Take 220 mg by mouth as needed for moderate pain      OLANZapine zydis (ZYPREXA) 5 MG ODT Take 1 tablet (5 mg) by mouth at bedtime. (Patient taking differently: Take 5 mg by mouth at bedtime. PRN) 20 tablet 0    rosuvastatin (CRESTOR) 10 MG tablet Take 1 tablet (10 mg) by mouth daily 90 tablet 3    sertraline (ZOLOFT) 100 MG tablet Take 1 tablet (100 mg) by mouth daily. 90 tablet 0    gabapentin (NEURONTIN) 100 MG capsule Take 3 capsules (300 mg) by mouth 3 times daily for 7 days, THEN 3 capsules (300 mg) 2 times daily for 7 days, THEN 2 capsules (200 mg) 2 times daily for 7 days, THEN 1 capsule (100 mg) 2 times daily for 7 days. 147 capsule 0    Multiple Vitamin (MULTI-VITAMIN PO) Take by mouth as needed  (Patient not taking: Reported on 10/29/2024)      SPIKEVAX 50 MCG/0.5ML injection  (Patient not taking: Reported on 10/29/2024)                 Review of Systems  CONSTITUTIONAL: NEGATIVE for fever, chills   RESP: NEGATIVE for significant cough or SOB  CV: NEGATIVE for chest pain, palpitations or peripheral edema  PSYCHIATRIC: Anxious but getting better      Objective           Vitals:  No vitals were obtained today due to virtual visit.    Physical Exam   GENERAL: alert and no distress  EYES: Eyes grossly normal to inspection.  No discharge or erythema, or obvious scleral/conjunctival abnormalities.  RESP: No  audible wheeze, cough, or visible cyanosis.    PSYCH: Appropriate affect, tone, and pace of words        Video-Visit Details    Type of service:  Video Visit   Video End Time:11:43 AM  Originating Location (pt. Location): Home    Distant Location (provider location):  On-site  Platform used for Video Visit: Doximzander  Signed Electronically by: Js Marrero MD

## 2024-10-31 NOTE — TELEPHONE ENCOUNTER
MyChart sent to patient.     Thank you,  Kailash Christianson, Triage RN [facility]  5:29 PM 10/31/2024

## 2024-10-31 NOTE — TELEPHONE ENCOUNTER
Pt has had symptom relief with  medication prescribed through ER (zyprexa) for prn anxiety. Advise pt to continue .

## 2024-11-14 ENCOUNTER — PATIENT OUTREACH (OUTPATIENT)
Dept: CARE COORDINATION | Facility: CLINIC | Age: 69
End: 2024-11-14
Payer: COMMERCIAL

## 2024-11-18 DIAGNOSIS — E78.5 HYPERLIPIDEMIA LDL GOAL <130: ICD-10-CM

## 2024-11-18 RX ORDER — ROSUVASTATIN CALCIUM 10 MG/1
10 TABLET, COATED ORAL DAILY
Qty: 90 TABLET | Refills: 2 | Status: SHIPPED | OUTPATIENT
Start: 2024-11-18

## 2024-11-19 ENCOUNTER — MYC MEDICAL ADVICE (OUTPATIENT)
Dept: INTERNAL MEDICINE | Facility: CLINIC | Age: 69
End: 2024-11-19
Payer: COMMERCIAL

## 2024-11-20 ENCOUNTER — VIRTUAL VISIT (OUTPATIENT)
Dept: INTERNAL MEDICINE | Facility: CLINIC | Age: 69
End: 2024-11-20
Payer: COMMERCIAL

## 2024-11-20 DIAGNOSIS — Z12.31 VISIT FOR SCREENING MAMMOGRAM: Primary | ICD-10-CM

## 2024-11-20 DIAGNOSIS — F32.5 MAJOR DEPRESSIVE DISORDER WITH SINGLE EPISODE, IN FULL REMISSION (H): ICD-10-CM

## 2024-11-20 DIAGNOSIS — F41.9 ANXIETY: ICD-10-CM

## 2024-11-20 PROCEDURE — 99213 OFFICE O/P EST LOW 20 MIN: CPT | Mod: 95 | Performed by: INTERNAL MEDICINE

## 2024-11-20 PROCEDURE — G2211 COMPLEX E/M VISIT ADD ON: HCPCS | Mod: 95 | Performed by: INTERNAL MEDICINE

## 2024-11-20 RX ORDER — SERTRALINE HYDROCHLORIDE 100 MG/1
150 TABLET, FILM COATED ORAL DAILY
Status: SHIPPED
Start: 2024-11-20 | End: 2024-11-25

## 2024-11-20 ASSESSMENT — ANXIETY QUESTIONNAIRES
5. BEING SO RESTLESS THAT IT IS HARD TO SIT STILL: NOT AT ALL
GAD7 TOTAL SCORE: 5
8. IF YOU CHECKED OFF ANY PROBLEMS, HOW DIFFICULT HAVE THESE MADE IT FOR YOU TO DO YOUR WORK, TAKE CARE OF THINGS AT HOME, OR GET ALONG WITH OTHER PEOPLE?: NOT DIFFICULT AT ALL
2. NOT BEING ABLE TO STOP OR CONTROL WORRYING: SEVERAL DAYS
IF YOU CHECKED OFF ANY PROBLEMS ON THIS QUESTIONNAIRE, HOW DIFFICULT HAVE THESE PROBLEMS MADE IT FOR YOU TO DO YOUR WORK, TAKE CARE OF THINGS AT HOME, OR GET ALONG WITH OTHER PEOPLE: NOT DIFFICULT AT ALL
3. WORRYING TOO MUCH ABOUT DIFFERENT THINGS: NOT AT ALL
1. FEELING NERVOUS, ANXIOUS, OR ON EDGE: MORE THAN HALF THE DAYS
GAD7 TOTAL SCORE: 5
7. FEELING AFRAID AS IF SOMETHING AWFUL MIGHT HAPPEN: NOT AT ALL
6. BECOMING EASILY ANNOYED OR IRRITABLE: SEVERAL DAYS
GAD7 TOTAL SCORE: 5
4. TROUBLE RELAXING: SEVERAL DAYS
7. FEELING AFRAID AS IF SOMETHING AWFUL MIGHT HAPPEN: NOT AT ALL

## 2024-11-20 ASSESSMENT — ENCOUNTER SYMPTOMS: NERVOUS/ANXIOUS: 1

## 2024-11-20 NOTE — TELEPHONE ENCOUNTER
Per patient's most recent SPARQhart message, she would like to take the virtual appointment today @ 2:45p.    Appointment scheduled.

## 2024-11-20 NOTE — PROGRESS NOTES
Whitney is a 69 year old who is being evaluated via a billable video visit.    How would you like to obtain your AVS? MyChart  If the video visit is dropped, the invitation should be resent by: Text to cell phone: [phone]  Will anyone else be joining your video visit? No  {If patient encounters technical issues they should call [phone] :[id number]}    Assessment & Plan     {Diag Picklist:727273}            {FOLLOW UP PLANS (Optional) Includes COVID19 Treatment Plan:623721}    Subjective   Whitney is a 69 year old, presenting for the following health issues:  Anxiety, Depression, and Thyroid Problem        11/20/2024     1:55 PM   Additional Questions   Roomed by jason   Accompanied by self         11/20/2024     1:55 PM   Patient Reported Additional Medications   Patient reports taking the following new medications none       Video Start Time: 2:36 PM    Anxiety    History of Present Illness       Mental Health Follow-up:  Patient presents to follow-up on Depression & Anxiety.Patient's depression since last visit has been:  Medium  The patient is having other symptoms associated with depression.  Patient's anxiety since last visit has been:  Bad  The patient is having other symptoms associated with anxiety.  Any significant life events: health concerns  Patient is feeling anxious or having panic attacks.  Patient has no concerns about alcohol or drug use.She consumes 1 sweetened beverage(s) daily.She exercises with enough effort to increase her heart rate 30 to 60 minutes per day.  She exercises with enough effort to increase her heart rate 5 days per week.   She is taking medications regularly.       Depression and Anxiety   How are you doing with your depression since your last visit? No change  , on Zoloft 100 mg daily for 12 yr stopped gabapentin 11 days ago.   How are you doing with your anxiety since your last visit?  No change  Are you having other symptoms that might be associated with depression or  anxiety? No  Have you had a significant life event? No   Do you have any concerns with your use of alcohol or other drugs? No        10/1/2024     1:21 PM 10/1/2024     1:30 PM 10/29/2024    11:38 AM   PHQ   PHQ-9 Total Score 6 5 4   Q9: Thoughts of better off dead/self-harm past 2 weeks Not at all Not at all  Not at all       Patient-reported         9/29/2024    12:58 PM 10/28/2024     2:12 PM 11/20/2024     1:28 PM   OSCAR-7 SCORE   Total Score 5 (mild anxiety) 7 (mild anxiety) 5 (mild anxiety)   Total Score 5 7  5        Patient-reported         Hypothyroidism Follow-up    Since last visit, patient describes the following symptoms: Weight stable, no hair loss, no skin changes, no constipation, no loose stools      How many servings of fruits and vegetables do you eat daily?  2-3  On average, how many sweetened beverages do you drink each day (Examples: soda, juice, sweet tea, etc.  Do NOT count diet or artificially sweetened beverages)?   2  How many days per week do you exercise enough to make your heart beat faster? 3 or less  How many minutes a day do you exercise enough to make your heart beat faster? 9 or less  How many days per week do you miss taking your medication? 0  {additonal problems for provider to add (Optional):367042}    {ROS Picklists (Optional):332417}      Objective           Vitals:  No vitals were obtained today due to virtual visit.    Physical Exam   {video visit exam brief selected:348295}          Video-Visit Details    Type of service:  Video Visit   Video End Time:2:47 PM  Originating Location (pt. Location): Home  {PROVIDER LOCATION On-site should be selected for visits conducted from your clinic location or adjoining [facility] hospital, academic office, or other nearby [facility] building. Off-site should be selected for all other provider locations, including home:679941}  Distant Location (provider location):  On-site  Platform used for Video Visit: Doxslick  Signed Electronically by:  Js Marrero MD  {Email feedback regarding this note to [email or web address]   :627013}

## 2024-11-21 ENCOUNTER — PATIENT OUTREACH (OUTPATIENT)
Dept: CARE COORDINATION | Facility: CLINIC | Age: 69
End: 2024-11-21
Payer: COMMERCIAL

## 2024-11-24 SDOH — HEALTH STABILITY: PHYSICAL HEALTH: ON AVERAGE, HOW MANY DAYS PER WEEK DO YOU ENGAGE IN MODERATE TO STRENUOUS EXERCISE (LIKE A BRISK WALK)?: 5 DAYS

## 2024-11-24 SDOH — HEALTH STABILITY: PHYSICAL HEALTH: ON AVERAGE, HOW MANY MINUTES DO YOU ENGAGE IN EXERCISE AT THIS LEVEL?: 30 MIN

## 2024-11-24 ASSESSMENT — PATIENT HEALTH QUESTIONNAIRE - PHQ9
SUM OF ALL RESPONSES TO PHQ QUESTIONS 1-9: 3
SUM OF ALL RESPONSES TO PHQ QUESTIONS 1-9: 3
10. IF YOU CHECKED OFF ANY PROBLEMS, HOW DIFFICULT HAVE THESE PROBLEMS MADE IT FOR YOU TO DO YOUR WORK, TAKE CARE OF THINGS AT HOME, OR GET ALONG WITH OTHER PEOPLE: NOT DIFFICULT AT ALL

## 2024-11-24 ASSESSMENT — SOCIAL DETERMINANTS OF HEALTH (SDOH): HOW OFTEN DO YOU GET TOGETHER WITH FRIENDS OR RELATIVES?: PATIENT DECLINED

## 2024-11-25 ENCOUNTER — OFFICE VISIT (OUTPATIENT)
Dept: INTERNAL MEDICINE | Facility: CLINIC | Age: 69
End: 2024-11-25
Payer: COMMERCIAL

## 2024-11-25 VITALS
WEIGHT: 138 LBS | RESPIRATION RATE: 14 BRPM | HEART RATE: 111 BPM | HEIGHT: 66 IN | OXYGEN SATURATION: 99 % | TEMPERATURE: 98 F | DIASTOLIC BLOOD PRESSURE: 70 MMHG | BODY MASS INDEX: 22.18 KG/M2 | SYSTOLIC BLOOD PRESSURE: 116 MMHG

## 2024-11-25 DIAGNOSIS — E78.5 HYPERLIPIDEMIA LDL GOAL <130: Primary | ICD-10-CM

## 2024-11-25 DIAGNOSIS — Z00.00 ENCOUNTER FOR MEDICARE ANNUAL WELLNESS EXAM: ICD-10-CM

## 2024-11-25 DIAGNOSIS — F33.0 MDD (MAJOR DEPRESSIVE DISORDER), RECURRENT EPISODE, MILD (H): ICD-10-CM

## 2024-11-25 DIAGNOSIS — F41.1 GAD (GENERALIZED ANXIETY DISORDER): ICD-10-CM

## 2024-11-25 DIAGNOSIS — E03.9 ACQUIRED HYPOTHYROIDISM: ICD-10-CM

## 2024-11-25 PROBLEM — F41.9 ANXIETY: Status: RESOLVED | Noted: 2018-07-15 | Resolved: 2024-11-25

## 2024-11-25 LAB
CHOLEST SERPL-MCNC: 185 MG/DL
FASTING STATUS PATIENT QL REPORTED: YES
HDLC SERPL-MCNC: 67 MG/DL
LDLC SERPL CALC-MCNC: 96 MG/DL
NONHDLC SERPL-MCNC: 118 MG/DL
TRIGL SERPL-MCNC: 110 MG/DL

## 2024-11-25 PROCEDURE — G0439 PPPS, SUBSEQ VISIT: HCPCS | Performed by: INTERNAL MEDICINE

## 2024-11-25 PROCEDURE — 80061 LIPID PANEL: CPT | Performed by: INTERNAL MEDICINE

## 2024-11-25 PROCEDURE — 36415 COLL VENOUS BLD VENIPUNCTURE: CPT | Performed by: INTERNAL MEDICINE

## 2024-11-25 PROCEDURE — 80076 HEPATIC FUNCTION PANEL: CPT | Performed by: INTERNAL MEDICINE

## 2024-11-25 PROCEDURE — 99214 OFFICE O/P EST MOD 30 MIN: CPT | Mod: 25 | Performed by: INTERNAL MEDICINE

## 2024-11-25 RX ORDER — SERTRALINE HYDROCHLORIDE 100 MG/1
150 TABLET, FILM COATED ORAL DAILY
Qty: 135 TABLET | Refills: 0 | Status: SHIPPED | OUTPATIENT
Start: 2024-11-25

## 2024-11-25 RX ORDER — LEVOTHYROXINE SODIUM 75 UG/1
75 TABLET ORAL DAILY
Qty: 90 TABLET | Refills: 1 | Status: SHIPPED | OUTPATIENT
Start: 2024-11-25

## 2024-11-25 NOTE — PROGRESS NOTES
Preventive Care Visit  [facility]  Js Marrero MD, Internal Medicine  Nov 25, 2024      Assessment & Plan       (Z00.00) Encounter for Medicare annual wellness exam  Plan: Mammogram has been ordered, up-to-date on colonoscopy, DEXA, immunizations reviewed, due for tetanus vaccine, patient will check with her pharmacy    (E78.5) Hyperlipidemia LDL goal <130    Plan: On rosuvastatin 10 mg daily, check lipid panel reflex to direct LDL Non-fasting, hepatic function panel            (F33.0) MDD (major depressive disorder), recurrent episode, mild (H)  (F41.1) OSCAR (generalized anxiety disorder)  Plan: sertraline (ZOLOFT) 100 MG tablet was recently increased 250 mg daily, patient is tolerating the medication with some improvement, will continue to monitor.            (E03.9) Acquired hypothyroidism  Plan: Last TSH reviewed stable, refilled levothyroxine (SYNTHROID/LEVOTHROID) 75 MCG tablet as directed.explained clearly about the medication,insructions and side effects.         Patient has been advised of split billing requirements and indicates understanding: Yes        Counseling  Appropriate preventive services were addressed with this patient via screening, questionnaire, or discussion as appropriate          Subjective   Whitney is a 69 year old, presenting for the following:  Medicare Visit (fasting)        11/25/2024    10:50 AM   Additional Questions   Roomed by jason   Accompanied by self         11/25/2024    10:50 AM   Patient Reported Additional Medications   Patient reports taking the following new medications none           HPI     Hyperlipidemia Follow-Up    Are you regularly taking any medication or supplement to lower your cholesterol?   Yes- crestor   Are you having muscle aches or other side effects that you think could be caused by your cholesterol lowering medication?  No    Depression and Anxiety   How are you doing with your depression since your last visit? No  change Zoloft was recently increased to 150 mg daily last week, tolerating well with some symptom improvement  How are you doing with your anxiety since your last visit?  Improved   Are you having other symptoms that might be associated with depression or anxiety? No  Have you had a significant life event? Health Concerns sciatica seeing ortho    Do you have any concerns with your use of alcohol or other drugs? No        10/1/2024     1:30 PM 10/29/2024    11:38 AM 11/24/2024    12:13 PM   PHQ   PHQ-9 Total Score 5 4 3    Q9: Thoughts of better off dead/self-harm past 2 weeks Not at all  Not at all Not at all        Patient-reported         9/29/2024    12:58 PM 10/28/2024     2:12 PM 11/20/2024     1:28 PM   OSCAR-7 SCORE   Total Score 5 (mild anxiety) 7 (mild anxiety) 5 (mild anxiety)   Total Score 5 7  5        Patient-reported        Hypothyroidism Follow-up    Since last visit, patient describes the following symptoms: Weight stable, no hair loss, no skin changes, no constipation, no loose stools    Health Care Directive  Patient does not have a Health Care Directive: Discussed advance care planning with patient; however, patient declined at this time.      11/24/2024   General Health   How would you rate your overall physical health? Excellent   Feel stress (tense, anxious, or unable to sleep) Patient declined            11/24/2024   Nutrition   Diet: Regular (no restrictions)            11/24/2024   Exercise   Days per week of moderate/strenous exercise 5 days   Average minutes spent exercising at this level 30 min            11/24/2024   Social Factors   Frequency of gathering with friends or relatives Patient declined   Worry food won't last until get money to buy more No   Food not last or not have enough money for food? No   Do you have housing? (Housing is defined as stable permanent housing and does not include staying ouside in a car, in a tent, in an abandoned building, in an overnight shelter, or  couch-surfing.) Yes   Are you worried about losing your housing? No   Lack of transportation? No   Unable to get utilities (heat,electricity)? No            11/24/2024   Fall Risk   Fallen 2 or more times in the past year? No     No    Trouble with walking or balance? No     No        Patient-reported    Multiple values from one day are sorted in reverse-chronological order          11/24/2024   Activities of Daily Living- Home Safety   Needs help with the following daily activites None of the above   Safety concerns in the home None of the above            11/24/2024   Dental   Dentist two times every year? Yes            11/24/2024   Hearing Screening   Hearing concerns? None of the above            11/24/2024   Driving Risk Screening   Patient/family members have concerns about driving No            11/24/2024   General Alertness/Fatigue Screening   Have you been more tired than usual lately? No            11/24/2024   Urinary Incontinence Screening   Bothered by leaking urine in past 6 months No            11/24/2024   TB Screening   Were you born outside of the [place]? No          Today's PHQ-9 Score:       11/24/2024    12:13 PM   PHQ-9 SCORE   PHQ-9 Total Score MyChart 3 (Minimal depression)   PHQ-9 Total Score 3        Patient-reported         11/24/2024   Substance Use   Alcohol more than 3/day or more than 7/wk Not Applicable   Do you have a current opioid prescription? No   How severe/bad is pain from 1 to 10? 3/10   Do you use any other substances recreationally? No        Social History     Tobacco Use    Smoking status: Never    Smokeless tobacco: Never   Vaping Use    Vaping status: Never Used   Substance Use Topics    Alcohol use: No    Drug use: No           12/14/2022   LAST FHS-7 RESULTS   1st degree relative breast or ovarian cancer No   Any relative bilateral breast cancer No   Any male have breast cancer No   Any ONE woman have BOTH breast AND ovarian cancer No   Any woman with breast cancer before  50yrs No   2 or more relatives with breast AND/OR ovarian cancer No   2 or more relatives with breast AND/OR bowel cancer No           Mammogram has been ordered            Reviewed and updated as needed this visit by Provider                    Past Medical History:   Diagnosis Date    Abnormal Pap smear 01/01/1991    colposcopy    GERD (gastroesophageal reflux disease)     Hashimoto's thyroiditis     biopsy left nodule    Hypothyroidism     IBS (irritable bowel syndrome)     Low back pain     episodic: MRI mild degenerative changes 2012    Lyme meningitis     presented with facial weakness    Major depression 05/01/2012    Uterine fibroid      Past Surgical History:   Procedure Laterality Date    COLONOSCOPY      NO HISTORY OF SURGERY       Current Outpatient Medications   Medication Sig Dispense Refill    ASPIRIN 81 MG OR TABS Every 3rd day      CALCIUM-VITAMIN D PO Take by mouth daily      famotidine (PEPCID) 10 MG tablet Take 10 mg by mouth daily      fish oil-omega-3 fatty acids 500 MG capsule       levothyroxine (SYNTHROID/LEVOTHROID) 75 MCG tablet Take 1 tablet (75 mcg) by mouth daily. 90 tablet 1    Multiple Vitamin (MULTI-VITAMIN PO) Take by mouth as needed.      Naproxen Sodium (ALEVE PO) Take 220 mg by mouth as needed for moderate pain      OLANZapine zydis (ZYPREXA) 5 MG ODT Take 1 tablet (5 mg) by mouth at bedtime. 20 tablet 0    rosuvastatin (CRESTOR) 10 MG tablet Take 1 tablet (10 mg) by mouth daily. 90 tablet 2    sertraline (ZOLOFT) 100 MG tablet Take 1.5 tablets (150 mg) by mouth daily. 135 tablet 0    SPIKEVAX 50 MCG/0.5ML injection        Current providers sharing in care for this patient include:  Patient Care Team:  Js Marrero MD as PCP - General  Js Marrero MD as Assigned PCP    The following health maintenance items are reviewed in Epic and correct as of today:  Health Maintenance   Topic Date Due    HEPATITIS A IMMUNIZATION (1 of 2 - Risk 2-dose series) Never  "done    ANNUAL REVIEW OF HM ORDERS  11/18/2023    DTAP/TDAP/TD IMMUNIZATION (5 - Td or Tdap) 02/28/2024    LIPID  11/22/2024    MEDICARE ANNUAL WELLNESS VISIT  11/22/2024    MAMMO SCREENING  12/14/2024    PHQ-9  05/25/2025    TSH W/FREE T4 REFLEX  09/30/2025    OSCAR ASSESSMENT  11/20/2025    FALL RISK ASSESSMENT  11/25/2025    GLUCOSE  09/30/2027    RSV VACCINE (1 - 1-dose 75+ series) 08/04/2028    ADVANCE CARE PLANNING  04/04/2029    DEXA  10/27/2030    COLORECTAL CANCER SCREENING  05/11/2031    HEPATITIS C SCREENING  Completed    DEPRESSION ACTION PLAN  Completed    INFLUENZA VACCINE  Completed    Pneumococcal Vaccine: 65+ Years  Completed    ZOSTER IMMUNIZATION  Completed    COVID-19 Vaccine  Completed    HPV IMMUNIZATION  Aged Out    MENINGITIS IMMUNIZATION  Aged Out    RSV MONOCLONAL ANTIBODY  Aged Out          Objective    Exam  /70   Pulse 111   Temp 98  F (36.7  C) (Tympanic)   Resp 14   Ht 1.67 m (5' 5.75\")   Wt 62.6 kg (138 lb)   SpO2 99%   Breastfeeding Unknown   BMI 22.44 kg/m     Estimated body mass index is 22.44 kg/m  as calculated from the following:    Height as of this encounter: 1.67 m (5' 5.75\").    Weight as of this encounter: 62.6 kg (138 lb).    Physical Exam  GENERAL: alert and no distress  EYES: Eyes grossly normal to inspection, PERRL and conjunctivae and sclerae normal  HENT: ear canals and TM's normal, nose and mouth without ulcers or lesions  RESP: lungs clear to auscultation - no rales, rhonchi or wheezes  BREAST: normal without masses, tenderness or nipple discharge and no palpable axillary masses or adenopathy  CV: regular rate and rhythm, normal S1 S2,   ABDOMEN: soft, nontender, and bowel sounds normal  MS: no gross musculoskeletal defects noted, no edema  NEURO: Normal strength and tone, mentation intact and speech normal  PSYCH: mentation appears normal, affect normal/bright            10/25/2024   Mini Cog   Clock Draw Score 2 Normal   3 Item Recall 3 objects " recalled   Mini Cog Total Score 5                 Signed Electronically by: Js Marrero MD    Answers submitted by the patient for this visit:  Patient Health Questionnaire (Submitted on 11/24/2024)  If you checked off any problems, how difficult have these problems made it for you to do your work, take care of things at home, or get along with other people?: Not difficult at all  PHQ9 TOTAL SCORE: 3

## 2024-11-25 NOTE — NURSING NOTE
"/70   Pulse 111   Temp 98  F (36.7  C) (Tympanic)   Resp 14   Ht 1.67 m (5' 5.75\")   Wt 62.6 kg (138 lb)   SpO2 99%   Breastfeeding Unknown   BMI 22.44 kg/m      "

## 2024-11-26 LAB
ALBUMIN SERPL BCG-MCNC: 4.4 G/DL (ref 3.5–5.2)
ALP SERPL-CCNC: 52 U/L (ref 40–150)
ALT SERPL W P-5'-P-CCNC: 14 U/L (ref 0–50)
AST SERPL W P-5'-P-CCNC: 22 U/L (ref 0–45)
BILIRUB DIRECT SERPL-MCNC: <0.2 MG/DL (ref 0–0.3)
BILIRUB SERPL-MCNC: 0.4 MG/DL
PROT SERPL-MCNC: 7.1 G/DL (ref 6.4–8.3)

## 2024-12-06 ENCOUNTER — HOSPITAL ENCOUNTER (OUTPATIENT)
Dept: MAMMOGRAPHY | Facility: CLINIC | Age: 69
Discharge: HOME | End: 2024-12-06
Attending: INTERNAL MEDICINE | Admitting: INTERNAL MEDICINE
Payer: COMMERCIAL

## 2024-12-06 DIAGNOSIS — Z12.31 VISIT FOR SCREENING MAMMOGRAM: ICD-10-CM

## 2024-12-06 PROCEDURE — 77067 SCR MAMMO BI INCL CAD: CPT

## 2024-12-06 PROCEDURE — 77063 BREAST TOMOSYNTHESIS BI: CPT

## 2024-12-11 ENCOUNTER — NURSE TRIAGE (OUTPATIENT)
Dept: INTERNAL MEDICINE | Facility: CLINIC | Age: 69
End: 2024-12-11
Payer: COMMERCIAL

## 2024-12-11 DIAGNOSIS — F41.1 GAD (GENERALIZED ANXIETY DISORDER): Primary | ICD-10-CM

## 2024-12-11 RX ORDER — HYDROXYZINE HYDROCHLORIDE 25 MG/1
25 TABLET, FILM COATED ORAL 2 TIMES DAILY PRN
Qty: 30 TABLET | Refills: 0 | Status: SHIPPED | OUTPATIENT
Start: 2024-12-11

## 2024-12-11 ASSESSMENT — ANXIETY QUESTIONNAIRES
GAD7 TOTAL SCORE: 13
6. BECOMING EASILY ANNOYED OR IRRITABLE: NOT AT ALL
1. FEELING NERVOUS, ANXIOUS, OR ON EDGE: NEARLY EVERY DAY
7. FEELING AFRAID AS IF SOMETHING AWFUL MIGHT HAPPEN: SEVERAL DAYS
IF YOU CHECKED OFF ANY PROBLEMS ON THIS QUESTIONNAIRE, HOW DIFFICULT HAVE THESE PROBLEMS MADE IT FOR YOU TO DO YOUR WORK, TAKE CARE OF THINGS AT HOME, OR GET ALONG WITH OTHER PEOPLE: SOMEWHAT DIFFICULT
4. TROUBLE RELAXING: NEARLY EVERY DAY
GAD7 TOTAL SCORE: 13
GAD7 TOTAL SCORE: 13
2. NOT BEING ABLE TO STOP OR CONTROL WORRYING: NEARLY EVERY DAY
8. IF YOU CHECKED OFF ANY PROBLEMS, HOW DIFFICULT HAVE THESE MADE IT FOR YOU TO DO YOUR WORK, TAKE CARE OF THINGS AT HOME, OR GET ALONG WITH OTHER PEOPLE?: SOMEWHAT DIFFICULT
3. WORRYING TOO MUCH ABOUT DIFFERENT THINGS: NOT AT ALL
5. BEING SO RESTLESS THAT IT IS HARD TO SIT STILL: NEARLY EVERY DAY
7. FEELING AFRAID AS IF SOMETHING AWFUL MIGHT HAPPEN: SEVERAL DAYS

## 2024-12-11 NOTE — TELEPHONE ENCOUNTER
2nd level triage- protocol states go to ed/ucc now or to office with pcp approval.     Patient declined urgent care.   Scheduled virtual tomorrow   See below, patient would like medication today    Situation   Patient calls about ongoing anxiety and depression    Patient would like to increased her Zoloft medication or a rx for hydroxyzine  Patient states her provider has talked about hydroxyzine with her pcp     Background    Disp Refills Start End CARY   sertraline (ZOLOFT) 100 MG tablet 135 tablet 0 11/25/2024 -- No   Sig - Route: Take 1.5 tablets (150 mg) by mouth daily. - Ora     See 12/8 mychart - routed to pcp     It's been almost 3 weeks since I increased my Sertraline and my symptoms aren't any better. As soon as I wake up in the morning my arms get hot and somewhat ashley and I can feel the anxiety building in my gut. I've been having crying jags, sometimes several times a day. This is starting to scare me, as I worry it's never going to stop. Do I increase my Sertraline again?  Also, the Cortisone injection hasn't helped, so that's causing a lot of frustration as well.  Thank you for your time.    Assessment   Patient states the anxiety and depression started after she stopped gabapentin. She explains she went through withdrawal after stopping and then the symptoms started since the end of September   Patient states she has felt depressed and anxious daily since but the last 10 days it has been worse.     The patient states she wakes up and her arms get hot and tingly. She feels the anxiety in her gut and then her legs start moving and she can't sit still.  She has periods of crying through out the day.   she explains that she forces her self to eat due to no appetite.    She states it is hard to motivate to do anything.    Denied anxiety about  one particular thing.   She states she  just don't feel good- can not articulate any other symptoms.     Denied feeling like she will harm herself or  others    She has friends she talks to and she states her  is really good to her.      Recommendations   Declined urgent care today  Scheduled virtual tomorrow with pcp- no openings today  Reviewed care advice.   Routing to pcp and triage team     Reason for Disposition   MODERATE anxiety (e.g., persistent or frequent anxiety symptoms; interferes with sleep, school, or work)   Panic attack symptoms (diagnosed in the past) that is not better with usual treatment, reassurance, or Care Advice    Additional Information   Negative: SEVERE difficulty breathing (e.g., struggling for each breath, speaks in single words)   Negative: Bluish (or gray) lips or face   Negative: Difficult to awaken or acting confused (e.g., disoriented, slurred speech)   Negative: Violent behavior, or threatening to physically hurt or kill someone   Negative: Sounds like a life-threatening emergency to the triager   Negative: Chest Pain   Negative: Palpitations, skipped heartbeat, or rapid heartbeat is main symptom   Negative: Suicide thoughts, threats, attempts, or questions   Negative: Depression is main problem or symptom (e.g., feelings of sadness or hopelessness)   Negative: Difficulty breathing and persists > 10 minutes and not relieved by reassurance provided by triager   Negative: Feeling weak or lightheaded (e.g., woozy, feeling like they might faint), and lasts > 10 minutes and not relieved by reassurance provided by triager   Negative: Alcohol or drug use, known or suspected, and feeling very shaky (i.e., visible tremors of hands)   Negative: Patient sounds very sick or weak to the triager   Negative: SEVERE anxiety (e.g., extremely anxious with intense emotional symptoms such as feeling of unreality, urge to flee, unable to calm down; unable to cope or function), which is not better after 10 minutes of reassurance and Care Advice   Negative: Panic attack symptoms (e.g., sudden onset of intense fear and symptoms such as  dizziness, feeling of impending doom or fear of dying, hyperventilation, tingling, sweating, trembling), and has not been evaluated for this by doctor (or NP/PA)   Commented on: Patient sounds very upset or troubled to the triager     Brief periods of crying- patient is calm and answering appropriately.    Protocols used: Anxiety and Panic Attack-A-OH

## 2024-12-11 NOTE — TELEPHONE ENCOUNTER
Hydroxyzine has been faxed to pharmacy, please advise patient not to drive or operate any machinery while on this med  will discuss about Zoloft or additional medication at her virtual visit tomorrow

## 2024-12-12 ENCOUNTER — VIRTUAL VISIT (OUTPATIENT)
Dept: INTERNAL MEDICINE | Facility: CLINIC | Age: 69
End: 2024-12-12
Payer: COMMERCIAL

## 2024-12-12 DIAGNOSIS — F33.0 MDD (MAJOR DEPRESSIVE DISORDER), RECURRENT EPISODE, MILD (H): ICD-10-CM

## 2024-12-12 DIAGNOSIS — F41.1 GAD (GENERALIZED ANXIETY DISORDER): Primary | ICD-10-CM

## 2024-12-12 PROCEDURE — G2211 COMPLEX E/M VISIT ADD ON: HCPCS | Mod: 95 | Performed by: INTERNAL MEDICINE

## 2024-12-12 PROCEDURE — 99214 OFFICE O/P EST MOD 30 MIN: CPT | Mod: 95 | Performed by: INTERNAL MEDICINE

## 2024-12-12 RX ORDER — VENLAFAXINE HYDROCHLORIDE 75 MG/1
75 CAPSULE, EXTENDED RELEASE ORAL DAILY
Qty: 30 CAPSULE | Refills: 0 | Status: SHIPPED | OUTPATIENT
Start: 2024-12-12

## 2024-12-12 RX ORDER — SERTRALINE HYDROCHLORIDE 100 MG/1
100 TABLET, FILM COATED ORAL DAILY
Status: SHIPPED
Start: 2024-12-12

## 2024-12-12 RX ORDER — VENLAFAXINE HYDROCHLORIDE 37.5 MG/1
37.5 CAPSULE, EXTENDED RELEASE ORAL DAILY
Qty: 7 CAPSULE | Refills: 0 | Status: SHIPPED | OUTPATIENT
Start: 2024-12-12

## 2024-12-12 NOTE — PROGRESS NOTES
Whitney is a 69 year old who is being evaluated via a billable video visit.    What phone number would you like to be contacted at?  [phone]   How would you like to obtain your AVS? Ojt  {If patient encounters technical issues they should call [phone] :[id number]}    {PROVIDER CHARTING PREFERENCE:697437}  Referred to [facility].    Subjective   Whitney is a 69 year old, presenting for the following health issues:  Recheck Medication (Anxiety, depression)      12/12/2024     9:35 AM   Additional Questions   Roomed by jason   Accompanied by self         12/12/2024     9:35 AM   Patient Reported Additional Medications   Patient reports taking the following new medications increased Zoloft     Video Start Time: 9:51 AM    History of Present Illness       Mental Health Follow-up:  Patient presents to follow-up on Depression & Anxiety.Patient's depression since last visit has been:  Worse  The patient is not having other symptoms associated with depression.  Patient's anxiety since last visit has been:  Worse  The patient is not having other symptoms associated with anxiety.  Any significant life events: No  Patient is feeling anxious or having panic attacks.  Patient has no concerns about alcohol or drug use.        Anxiety   How are you doing with your anxiety since your last visit? Worsened ***  Are you having other symptoms that might be associated with anxiety? No  Have you had a significant life event? No   Are you feeling depressed? Yes:  ***  Do you have any concerns with your use of alcohol or other drugs? No        10/28/2024     2:12 PM 11/20/2024     1:28 PM 12/11/2024    10:22 PM   OSCAR-7 SCORE   Total Score 7 (mild anxiety) 5 (mild anxiety) 13 (moderate anxiety)   Total Score 7  5  13        Patient-reported         10/1/2024     1:30 PM 10/29/2024    11:38 AM 11/24/2024    12:13 PM   PHQ   PHQ-9 Total Score 5 4 3    Q9: Thoughts of better off dead/self-harm past 2 weeks Not at all  Not at  all Not at all        Patient-reported         Past Medical History:   Diagnosis Date    Abnormal Pap smear 01/01/1991    colposcopy    GERD (gastroesophageal reflux disease)     Hashimoto's thyroiditis     biopsy left nodule    Hypothyroidism     IBS (irritable bowel syndrome)     Low back pain     episodic: MRI mild degenerative changes 2012    Lyme meningitis     presented with facial weakness    Major depression 05/01/2012    Uterine fibroid        Current Outpatient Medications   Medication Sig Dispense Refill    ASPIRIN 81 MG OR TABS Every 3rd day      CALCIUM-VITAMIN D PO Take by mouth daily      famotidine (PEPCID) 10 MG tablet Take 10 mg by mouth daily      fish oil-omega-3 fatty acids 500 MG capsule       hydrOXYzine HCl (ATARAX) 25 MG tablet Take 1 tablet (25 mg) by mouth 2 times daily as needed for itching or anxiety. 30 tablet 0    levothyroxine (SYNTHROID/LEVOTHROID) 75 MCG tablet Take 1 tablet (75 mcg) by mouth daily. 90 tablet 1    Multiple Vitamin (MULTI-VITAMIN PO) Take by mouth as needed.      Naproxen Sodium (ALEVE PO) Take 220 mg by mouth as needed for moderate pain      OLANZapine zydis (ZYPREXA) 5 MG ODT Take 1 tablet (5 mg) by mouth at bedtime. 20 tablet 0    rosuvastatin (CRESTOR) 10 MG tablet Take 1 tablet (10 mg) by mouth daily. 90 tablet 2    sertraline (ZOLOFT) 100 MG tablet Take 1.5 tablets (150 mg) by mouth daily. 135 tablet 0    SPIKEVAX 50 MCG/0.5ML injection          {additonal problems for provider to add (Optional):167600}    {ROS Picklists (Optional):918198}      Objective          Vitals:  No vitals were obtained today due to virtual visit.    Physical Exam   {video visit exam brief selected:561306}    {Diagnostic Test Results (Optional):094082}      Video-Visit Details    Type of service:  Video Visit   Video End Time:10:04 AM  Originating Location (pt. Location): Home  {PROVIDER LOCATION On-site should be selected for visits conducted from your clinic location or adjoining  [facility] hospital, academic office, or other nearby [facility] building. Off-site should be selected for all other provider locations, including home:981201}  Distant Location (provider location):  On-site  Platform used for Video Visit: Phong  Signed Electronically by: Js Marrero MD  {Email feedback regarding this note to [email or web address]   :[id number]}

## 2024-12-20 ENCOUNTER — HOSPITAL ENCOUNTER (EMERGENCY)
Facility: CLINIC | Age: 69
Discharge: HOME | End: 2024-12-20
Attending: EMERGENCY MEDICINE | Admitting: EMERGENCY MEDICINE
Payer: COMMERCIAL

## 2024-12-20 VITALS
TEMPERATURE: 97.5 F | DIASTOLIC BLOOD PRESSURE: 72 MMHG | WEIGHT: 135.14 LBS | HEIGHT: 66 IN | HEART RATE: 86 BPM | RESPIRATION RATE: 15 BRPM | OXYGEN SATURATION: 96 % | SYSTOLIC BLOOD PRESSURE: 130 MMHG | BODY MASS INDEX: 21.72 KG/M2

## 2024-12-20 DIAGNOSIS — F32.A DEPRESSION, UNSPECIFIED DEPRESSION TYPE: ICD-10-CM

## 2024-12-20 DIAGNOSIS — Z86.39 HISTORY OF THYROID DISEASE: ICD-10-CM

## 2024-12-20 DIAGNOSIS — F41.9 ANXIETY: ICD-10-CM

## 2024-12-20 PROBLEM — F43.20 ADJUSTMENT DISORDER: Status: ACTIVE | Noted: 2024-12-20

## 2024-12-20 LAB
ANION GAP SERPL CALCULATED.3IONS-SCNC: 11 MMOL/L (ref 7–15)
BUN SERPL-MCNC: 15.9 MG/DL (ref 8–23)
CALCIUM SERPL-MCNC: 9.9 MG/DL (ref 8.8–10.4)
CHLORIDE SERPL-SCNC: 104 MMOL/L (ref 98–107)
CREAT SERPL-MCNC: 0.99 MG/DL (ref 0.51–0.95)
EGFRCR SERPLBLD CKD-EPI 2021: 61 ML/MIN/1.73M2
GLUCOSE SERPL-MCNC: 103 MG/DL (ref 70–99)
HCO3 SERPL-SCNC: 24 MMOL/L (ref 22–29)
POTASSIUM SERPL-SCNC: 4.9 MMOL/L (ref 3.4–5.3)
SODIUM SERPL-SCNC: 139 MMOL/L (ref 135–145)
TSH SERPL DL<=0.005 MIU/L-ACNC: 1.09 UIU/ML (ref 0.3–4.2)

## 2024-12-20 PROCEDURE — 82565 ASSAY OF CREATININE: CPT | Performed by: EMERGENCY MEDICINE

## 2024-12-20 PROCEDURE — 250N000013 HC RX MED GY IP 250 OP 250 PS 637: Performed by: EMERGENCY MEDICINE

## 2024-12-20 PROCEDURE — 99284 EMERGENCY DEPT VISIT MOD MDM: CPT

## 2024-12-20 PROCEDURE — 36415 COLL VENOUS BLD VENIPUNCTURE: CPT | Performed by: EMERGENCY MEDICINE

## 2024-12-20 PROCEDURE — 80048 BASIC METABOLIC PNL TOTAL CA: CPT | Performed by: EMERGENCY MEDICINE

## 2024-12-20 PROCEDURE — 84443 ASSAY THYROID STIM HORMONE: CPT | Performed by: EMERGENCY MEDICINE

## 2024-12-20 RX ORDER — LORAZEPAM 0.5 MG/1
0.5 TABLET ORAL EVERY 6 HOURS PRN
Qty: 15 TABLET | Refills: 0 | Status: SHIPPED | OUTPATIENT
Start: 2024-12-20

## 2024-12-20 RX ORDER — LORAZEPAM 0.5 MG/1
0.5 TABLET ORAL ONCE
Status: COMPLETED | OUTPATIENT
Start: 2024-12-20 | End: 2024-12-20

## 2024-12-20 RX ADMIN — LORAZEPAM 0.5 MG: 0.5 TABLET ORAL at 17:20

## 2024-12-20 ASSESSMENT — ACTIVITIES OF DAILY LIVING (ADL)
ADLS_ACUITY_SCORE: 41

## 2024-12-20 ASSESSMENT — COLUMBIA-SUICIDE SEVERITY RATING SCALE - C-SSRS
2. HAVE YOU ACTUALLY HAD ANY THOUGHTS OF KILLING YOURSELF IN THE PAST MONTH?: NO
1. IN THE PAST MONTH, HAVE YOU WISHED YOU WERE DEAD OR WISHED YOU COULD GO TO SLEEP AND NOT WAKE UP?: NO
6. HAVE YOU EVER DONE ANYTHING, STARTED TO DO ANYTHING, OR PREPARED TO DO ANYTHING TO END YOUR LIFE?: NO

## 2024-12-20 NOTE — ED NOTES
Denies SI, HI. Crying. States she don't want to die but feels hopeless.States she has woke up crying and depressed.  at bedside.

## 2024-12-20 NOTE — ED PROVIDER NOTES
"  Emergency Department Note      History of Present Illness     Chief Complaint:  Psych eval    HPI   Yessi Claire is a 69 year old female with a history of anxiety, depression, and acquired hypothyroidism among others who presents with her  for evaluation of worsening anxiety and depression.  She states that this summer, she was started on gabapentin for sciatica, though it did not help her sciatica symptoms, so she tapered off it and developed anxiety and depression, she has been on sertraline 100 mg for many years, more recently her primary doctor started her on Effexor 37.5 mg for 1 week, and then increasing to 75 mg with the first dose at that strength being yesterday.  She was also prescribed hydroxyzine which she took on several occasions with no perceived benefit.  She has been tearful at times, she states \"I do not want to live, but I do not want to die either\".  No alcohol or drug use.    Independent Historian:  at bedside, who feels that the more medicines she is on, the worst she is doing.  He would like her to have a mental health assessment.   denies any other stressors in her life contributing to her mental health concerns.    Review of External Notes: I personally reviewed prior records including prior labs which include a normal TSH in late September.  Primary care note from December 12 is incomplete at this time.    Past Medical History     Medical History and Problem List   Past Medical History:   Diagnosis Date    Abnormal Pap smear 01/01/1991    GERD (gastroesophageal reflux disease)     Hashimoto's thyroiditis     Hypothyroidism     IBS (irritable bowel syndrome)     Low back pain     Lyme meningitis     Major depression 05/01/2012    Uterine fibroid        Medications   LORazepam (ATIVAN) 0.5 MG tablet  ASPIRIN 81 MG OR TABS  CALCIUM-VITAMIN D PO  famotidine (PEPCID) 10 MG tablet  fish oil-omega-3 fatty acids 500 MG capsule  hydrOXYzine HCl (ATARAX) 25 MG " "tablet  levothyroxine (SYNTHROID/LEVOTHROID) 75 MCG tablet  Multiple Vitamin (MULTI-VITAMIN PO)  Naproxen Sodium (ALEVE PO)  OLANZapine zydis (ZYPREXA) 5 MG ODT  rosuvastatin (CRESTOR) 10 MG tablet  sertraline (ZOLOFT) 100 MG tablet  SPIKEVAX 50 MCG/0.5ML injection  venlafaxine (EFFEXOR XR) 37.5 MG 24 hr capsule  venlafaxine (EFFEXOR XR) 75 MG 24 hr capsule      Surgical History   Past Surgical History:   Procedure Laterality Date    COLONOSCOPY      NO HISTORY OF SURGERY       Physical Exam     Patient Vitals for the past 24 hrs:   BP Temp Temp src Pulse Resp SpO2 Height Weight   12/20/24 1813 138/71 -- -- 87 15 97 % -- --   12/20/24 1723 132/72 -- -- 91 15 99 % -- --   12/20/24 1647 139/58 -- -- 93 -- 97 % -- --   12/20/24 1444 (!) 150/70 97.5  F (36.4  C) Oral 110 22 96 % 1.676 m (5' 6\") 61.3 kg (135 lb 2.3 oz)     Physical Exam  General: woman sitting upright in room 27,  at bedside  HENT: mucous membranes moist  Eyes: PERRL without nystagmus  CV: extremities well perfused, regular rhythm  Resp: normal effort, speaks in full phrases, no stridor, no cough observed  GI: abdomen soft and nontender, no guarding  MSK: no bony tenderness   Skin: appropriately warm and dry  Neuro: alert, clear speech, oriented, normal tone in extremities, ambulatory  Psych: Anxious, tearful, cooperative, no suicidal plan, no evidence of hallucinations    Diagnostics   Lab Results   Labs Ordered and Resulted from Time of ED Arrival to Time of ED Departure   BASIC METABOLIC PANEL - Abnormal       Result Value    Sodium 139      Potassium 4.9      Chloride 104      Carbon Dioxide (CO2) 24      Anion Gap 11      Urea Nitrogen 15.9      Creatinine 0.99 (*)     GFR Estimate 61      Calcium 9.9      Glucose 103 (*)    TSH WITH FREE T4 REFLEX - Normal    TSH 1.09         ED Course      Medications Administered   Medications   LORazepam (ATIVAN) tablet 0.5 mg (0.5 mg Oral $Given 12/20/24 1720)       ED Course and Discussion of Management "   ED Course as of 12/20/24 2045   Fri Dec 20, 2024   1822 I rechecked patient, discussed lab results and delay for DEC.  She states she is feeling significantly better after lorazepam.  She has no other acute needs at this time.       Additional Documentation  None    Medical Decision Making / Diagnosis   Medical Decision Making:  Patient presents with worsening anxiety and depression, she been working with her primary on this, she is not currently overtly suicidal and is here with her supportive family member, she is sober, no signs or symptoms of an immediately dangerous medical process, basic laboratory studies are reassuring including her thyroid.  At the time of signout to my colleague Dr. Guzman, she is waiting for involuntary DEC assessment to help explore her presenting symptoms in more detail.  Patient felt significantly better after dose of oral lorazepam that she ultimately accepted.  Disposition pending DEC consultation and will be coordinated by my colleague Dr. Guzman who accepted care on the evening shift as a signout.    Disposition   Signed out to Dr. Guzman, anticipate likely discharge home    Diagnosis     ICD-10-CM    1. Anxiety  F41.9       2. Depression, unspecified depression type  F32.A       3. History of thyroid disease  Z86.39          12/20/2024   MD Sybil Alejandra, Juan C Mora MD  12/20/24 2047

## 2024-12-20 NOTE — ED TRIAGE NOTES
"Pt here w/ spouse, states \"I think I'm having a mental breakdown.\" Pt is anxious, tearful, and distressed. States she has had ongoing mental health struggles since tapering off Gabapentin. Started on effexor last Thursday by PCP and has been taking it as prescribed. States she feels her symptoms are more depression vs anxiety. Denies SI/HI. Denies any other drug/alcohol use. Tried hydroxyzine w/o relief. Does have zyprexa that she has used in the past but reports \"that stopped working.\" Scheduled w/ [facility] for the end of January.         "

## 2024-12-21 NOTE — ED PROVIDER NOTES
Increasing anxiety and depression after gabapentin taper.    Recently started selective serotonin reuptake inhibitor    Passive SI/hopelessness.    Sertraline 100 mg  Effexor 75 mg-recently started in ramping up through primary care.    No current rescue medications for mental health.    Feeling better after 0.5 ativan.    Labs ok and medically cleared.    DEC eval and dispo    No SI or HI- no indication for inpatient admission.        Patient calm and cooperative at time of my reassessment.  Discussed with Janette from DEC.  I patient will be connected with psychiatry appointment on Monday.  Patient reports episodes of crying and feelings of anxiety and hopelessness.  She feels significantly better after Ativan.  She is just she has tried hydroxyzine and Zyprexa as needed in the past with about sustained relief.  I discussed with her risk benefits and options for Ativan prescription.  Discussed risk for dependence.  Discussed that this would not be a long-term medication.  She is interested in trying this over the weekend.  Denies substance abuse.  I discussed no alcohol or driving when using Ativan.  Small prescription given.  Discussed that if develops suicidal homicidal ideation or other worsening needs to return to ED for reassessment.  Telemetry psychiatry appointment scheduled for Monday per dad.  Discharged home      ICD-10-CM    1. Anxiety  F41.9       2. Depression, unspecified depression type  F32.A       3. History of thyroid disease  Z86.39                    Osiris Guzman MD  12/20/24 2025

## 2024-12-21 NOTE — DISCHARGE INSTRUCTIONS
Follow Up    Date: Monday, 12/23/2024  Time: 7:00 pm - 8:00 pm  Provider: Pipe PRITCHARD  CNP,RN  Location: [facility], [address] [place] [id number]  Phone: [phone]  Type: Telepsychiatry      Patient instructions  Please complete New Patient Form by using following link:  [employer] . All forms need to be completed 24 hours prior to the appointment date/time by going to  [employer] . Please call us on  [phone]  24 hours prior to your scheduled appointment to confirm that you plan to attend. For telehealth appointments: you will also be sent a link to attend the appointment remotely.      Discharge Instructions  Mental Health Concerns    You were seen today for mental health concerns, such as depression, anxiety, or suicidal thinking. Your provider feels that you do not require hospitalization at this time. However, your symptoms may become worse, and you may need to return to the Emergency Department. Most treatments of depression and suicidal thoughts are a process rather than a single intervention.  Medications and counseling can take several weeks or more to help.    Generally, every Emergency Department visit should have a follow-up clinic visit with either a primary or a specialty clinic/provider. Please follow-up as instructed by your emergency provider today.    By accepting these discharge instructions:  You promise to not harm yourself or others.  You agree that if you feel you are becoming unable to keep that promise, you will do something to help yourself before you do anything to harm yourself or others.   You agree to keep any safety plan arranged on your visit here today.  You agree to take any medication prescribed or recommended by your provider.  If you are getting worse, you can contact a friend or a family member, contact your counselor or family provider, contact a crisis line, or other options discussed with the provider or therapist  today.  At any time, you can call 911 and return to the Emergency Department for more help.  You understand that follow-up is essential to your treatment, and you will make and keep appointments recommended on your visit today.    How to improve your mental health and prevent suicide:  Involve others by letting family, friends, counselors know.  Do not isolate yourself.  Avoid alcohol or drugs. Remove weapons, poisons from your home.  Try to stick to routines for eating, sleeping and getting regular exercise.    Try to get into sunlight. Bright natural light not only treats seasonal affective disorder but also depression.  Increase safe activities that you enjoy.    If you feel worse, contact [phone] ([phone]), or call 911, or your primary provider/counselor for additional assistance.    If you were given a prescription for medicine here today, be sure to read all of the information (including the package insert) that comes with your prescription.  This will include important information about the medicine, its side effects, and any warnings that you need to know about.  The pharmacist who fills the prescription can provide more information and answer questions you may have about the medicine.  If you have questions or concerns that the pharmacist cannot address, please call or return to the Emergency Department.   Remember that you can always come back to the Emergency Department if you are not able to see your regular provider in the amount of time listed above, if you get any new symptoms, or if there is anything that worries you.      Date: Monday, 12/23/2024  Time: 7:00 pm - 8:00 pm  Provider: Pipe PRITCHARD  CNP,RN  Location: [facility], [address] [place] [address]  Phone: [phone]  Type: Telepsychiatry      Patient instructions  Please complete New Patient Form by using following link:  [employer] . All forms need to be completed 24 hours prior to  the appointment date/time by going to  [employer] . Please call us on  [phone]  24 hours prior to your scheduled appointment to confirm that you plan to attend. For telehealth appointments: you will also be sent a link to attend the appointment remotely.

## 2024-12-21 NOTE — ED NOTES
Pt discharged. Discharged instructions and papers given. Discharged on stable condition. Ambulatory with . A&Ox4

## 2024-12-21 NOTE — CONSULTS
Diagnostic Evaluation Consultation  Crisis Assessment    Patient Name: Yessi Claire  Age:  69 year old  Legal Sex: female  Gender Identity: female  Pronouns:   Race: White  Ethnicity: Not  or   Language: English      Patient was assessed: Virtual: i[employer]   Crisis Assessment Start Date: 12/20/24  Crisis Assessment Start Time: 1928  Crisis Assessment Stop Time: 1953  Patient location: [facility] EMERGENCY DEPT                                 Referral Data and Chief Complaint  Yessi Claire presents to the ED with family/friends. Patient is presenting to the ED for the following concerns: Worsening psychosocial stress. Factors that make the mental health crisis life threatening or complex are: Pt presents for symptoms of anxiety and depression. Pt reports she hasn't felt like herself since September, when she started tapering off Gabapentin; which was prescribed to treat her siatica pain. Pt denies SI/SIB/SA/HI and denies plans, means, or intent to harm herself or others. Pt denies hallucinations or delusions. Pt reports she has been experiencing symptoms of anxiety attacks, hopelessness, loss of appetite and feeling low. Pt reports she has had crying spells. Pt denies stressors in her life and feels her mental health has been impacted by all of her changing medications in the past 3 months. Pt feels safe to return home..      Informed Consent and Assessment Methods  Explained the crisis assessment process, including applicable information disclosures and limits to confidentiality, assessed understanding of the process, and obtained consent to proceed with the assessment.  Assessment methods included conducting a formal interview with patient, review of medical records, collaboration with medical staff, and obtaining relevant collateral information from family and community providers when available.  : done     History of the Crisis   Pt reports history of anxiety and depression  symptoms that started with pt tapering off Gabapentin in Sept 2024. Pt reports 2 previous ED visits for symptoms of anxiety and depression and denies history of inpatient psychiatric hospitalization. Pt denies history of SI/SIB/SA/HI and denies history of hallucinations or delusions. Pt reports she works with her PCP and orthopedic provider to manage medications.    Brief Psychosocial History  Family:  , Children yes  Support System:    Employment Status:  retired, unemployed  Source of Income:  social security, pension/[employer]  Financial Environmental Concerns:  none  Current Hobbies:  arts/crafts, reading, social media/computer activities, television/movies/videos, meditation, exercise/fitness, outdoor activities, care of plants, home improvement, gardening, group/social activities, family functions  Barriers in Personal Life:  mental health concerns    Significant Clinical History  Current Anxiety Symptoms:  racing thoughts, excessive worry, shortness of breath or racing heart, anxious  Current Depression/Trauma:  hopelessness, crying or feels like crying, helplessness, low self esteem, negativistic, withdrawl/isolation, sense of doom, sadness  Current Somatic Symptoms:  sweating, flushing, shaking, somatic symptoms (abdominal pain, headache, tension), shortness of breath or racing heart, anxious  Current Psychosis/Thought Disturbance:     Current Eating Symptoms:  loss of appetite  Chemical Use History:  Alcohol: None  Benzodiazepines: None  Opiates: None  Cocaine: None  Marijuana: None  Other Use: None   Past diagnosis:  Anxiety Disorder, Depression  Family history:  Substance Use Disorder, Anxiety Disorder  Past treatment:  Primary Care, Psychiatric Medication Management  Details of most recent treatment:  Pt denies current outpatient mental health service providers.  Other relevant history:  Pt reports she had 3 siblings but one passed away.  Pt reports she lives with her  and is  "retired.  Pt denies history of legal issues. Pt also denied history of being abused. Pt reports she has siatica pain and works with her orthopedic doctor and PCP to manage this.    Have there been any medication changes in the past two weeks:   (Pt reports Sertraline was increased from 100 to 125mg to 150mg over the last 2 months and is not back on 100mg for the past week and started Effexor 37.5mg to 75mg starting yesterday.)       Is the patient compliant with medications:  yes        Collateral Information  Is there collateral information: Yes     Collateral information name, relationship, phone number:  WARD HARDWICK (Spouse) [phone] None [phone]    What happened today: Reports pt has struggled with anxiety and depression since being tapered off in Sept.     What is different about patient's functioning: Reports pt has been feeling highly anxious when she wakes up, \"she in hysterics\"     What do you think the patient needs:      Has patient made comments about wanting to kill themselves/others: no    If d/c is recommended, can they take part in safety/aftercare planning:  yes    Additional collateral information:        Risk Assessment  Springer Suicide Severity Rating Scale Full Clinical Version:  Suicidal Ideation  Q1 Wish to be Dead (Lifetime): No  Q2 Non-Specific Active Suicidal Thoughts (Lifetime): No  Q6 Suicide Behavior (Lifetime): no     Suicidal Behavior (Lifetime)  Actual Attempt (Lifetime): No  Has subject engaged in non-suicidal self-injurious behavior? (Lifetime): No  Interrupted Attempts (Lifetime): No  Aborted or Self-Interrupted Attempt (Lifetime): No  Preparatory Acts or Behavior (Lifetime): No    Springer Suicide Severity Rating Scale Recent:   Suicidal Ideation (Recent)  Q1 Wished to be Dead (Past Month): no  Q2 Suicidal Thoughts (Past Month): no  Level of Risk per Screen: no risks indicated          Environmental or Psychosocial Events: helplessness/hopelessness, other life " stressors, recent life events (see comment)  Protective Factors: Protective Factors: lives in a responsibly safe and stable environment, intact marriage or domestic partnership, help seeking, sense of importance of health and wellness, constructive use of leisure time, enjoyable activities, resilience, reality testing ability, able to access care without barriers    Does the patient have thoughts of harming others? Feels Like Hurting Others: no  Previous Attempt to Hurt Others: no  Is the patient engaging in sexually inappropriate behavior?: no  Does Patient have a known history of aggressive behavior: No  Has aggression occurred as a result of MH concerns/diagnosis: No  Does patient have history of aggression in hospital: No    Is the patient engaging in sexually inappropriate behavior?  no        Mental Status Exam   Affect: Appropriate  Appearance: Appropriate  Attention Span/Concentration: Attentive  Eye Contact: Engaged    Fund of Knowledge: Appropriate   Language /Speech Content: Fluent  Language /Speech Volume: Normal  Language /Speech Rate/Productions: Normal  Recent Memory: Intact  Remote Memory: Intact  Mood: Anxious, Sad  Orientation to Person: Yes   Orientation to Place: Yes  Orientation to Time of Day: Yes  Orientation to Date: Yes     Situation (Do they understand why they are here?): Yes  Psychomotor Behavior: Normal  Thought Content: Clear  Thought Form: Intact     Mini-Cog Assessment  Number of Words Recalled:    Clock-Drawing Test:     Three Item Recall: 3 objects recalled  Mini-Cog Total Score:       Medication  Psychotropic medications:   Medication Orders - Psychiatric (From admission, onward)      Start     Dose/Rate Route Frequency Ordered Stop    12/20/24 0000  LORazepam (ATIVAN) 0.5 MG tablet         0.5 mg Oral EVERY 6 HOURS PRN 12/20/24 2023               Current Care Team  Patient Care Team:  Js Marrero MD as PCP - General  Js Marrero MD as Assigned  PCP    Diagnosis  Patient Active Problem List   Diagnosis Code    Acquired hypothyroidism E03.9    Major depressive disorder with single episode, in full remission (H) F32.5    Low back pain M54.50    Hashimoto's thyroiditis E06.3    Hyperlipidemia LDL goal <130 E78.5    OSCAR (generalized anxiety disorder) F41.1    MDD (major depressive disorder), recurrent episode, mild (H) F33.0    Adjustment disorder F43.20       Primary Problem This Admission  Active Hospital Problems    *Adjustment disorder        Clinical Summary and Substantiation of Recommendations   Clinical Substantiation:  After therapeutic assessment, intervention and aftercare planning by ED care team and LMHP and in consultation with attending provider, the patient's circumstances and mental state were appropriate for outpatient management. It is the recommendation of this clinician that pt discharge with OP MH support. At this time the pt is not presenting as an acute risk to self or others due to the following factors: Pt presents with symptoms of anxiety and depression, after a medication adjustments, starting in Sept 2024. Pt denies SI/SIB/SA/HI and denies plans, means, or intent to harm herself or others. Pt denies hallucinations or delusions. Pt reports she has a therapy appt in 2 weeks and is open to medication management. Pt was scheduled for an appt on 12/23/2024, information listed in AVS.    Goals for crisis stabilization:       Next steps for Care Team:  Pt will discharge.    Treatment Objectives Addressed:  rapport building, orienting the patient to therapy, identifying and practicing coping strategies, identifying additional supports, identifying treatment goals, assessing safety, safety planning, processing feelings, identifying an appropriate aftercare plan, exploring obstacles to safety in the community, building skills, building distress tolerance, Lethal means counseling    Therapeutic Interventions:  Engaged in safety planning,  Engaged in cognitive restructuring/ reframing, looked at common cognitive distortions and challenged negative thoughts., Engaged in activity scheduling and behavioral activation, looking at and reviewing the prior week's goals, problem solving any barriers and acknowledging successes, as well as setting new goals., Coached on coping techniques/relaxation skills to help improve distress tolerance and managing intense emotions., Explored motivation for behavioral change., Discussed and practiced mindfulness., Provided positive reinforcement for progress towards goals, gains in knowledge, and application of skills previously taught.    Has a specific means been identified for suicidal/homicide actions: No    If yes, describe:       Explain action steps toward mitigation:       Document completion of mitigation actions:       The follow up action still needed prior to discharge:       Patient coping skills attempted to reduce the crisis:  deep breathing exercise, meditation, taking walks, hiking, gardening    Disposition  Recommended referrals: Individual Therapy, Medication Management        Reviewed case and recommendations with attending provider. Attending Name: Dr. Guzman       Attending concurs with disposition: yes       Patient and/or validated legal guardian concurs with disposition:   yes       Final disposition:  discharge                            Legal status:                                                                                                                                           Assessment Details   Total duration spent with the patient: 25 min     CPT code(s) utilized: 65377 - Psychotherapy (with patient) - 30 (16-37*) min    Janette Navas [facility], Psychotherapist  [facility] - Triage & Transition Services  Callback: [phone]

## 2024-12-28 ASSESSMENT — ANXIETY QUESTIONNAIRES
GAD7 TOTAL SCORE: 17
4. TROUBLE RELAXING: NEARLY EVERY DAY
8. IF YOU CHECKED OFF ANY PROBLEMS, HOW DIFFICULT HAVE THESE MADE IT FOR YOU TO DO YOUR WORK, TAKE CARE OF THINGS AT HOME, OR GET ALONG WITH OTHER PEOPLE?: SOMEWHAT DIFFICULT
6. BECOMING EASILY ANNOYED OR IRRITABLE: SEVERAL DAYS
2. NOT BEING ABLE TO STOP OR CONTROL WORRYING: NEARLY EVERY DAY
IF YOU CHECKED OFF ANY PROBLEMS ON THIS QUESTIONNAIRE, HOW DIFFICULT HAVE THESE PROBLEMS MADE IT FOR YOU TO DO YOUR WORK, TAKE CARE OF THINGS AT HOME, OR GET ALONG WITH OTHER PEOPLE: SOMEWHAT DIFFICULT
GAD7 TOTAL SCORE: 17
7. FEELING AFRAID AS IF SOMETHING AWFUL MIGHT HAPPEN: SEVERAL DAYS
7. FEELING AFRAID AS IF SOMETHING AWFUL MIGHT HAPPEN: SEVERAL DAYS
1. FEELING NERVOUS, ANXIOUS, OR ON EDGE: NEARLY EVERY DAY
3. WORRYING TOO MUCH ABOUT DIFFERENT THINGS: NEARLY EVERY DAY
5. BEING SO RESTLESS THAT IT IS HARD TO SIT STILL: NEARLY EVERY DAY
GAD7 TOTAL SCORE: 17

## 2024-12-30 ENCOUNTER — OFFICE VISIT (OUTPATIENT)
Dept: BEHAVIORAL HEALTH | Facility: CLINIC | Age: 69
End: 2024-12-30
Payer: COMMERCIAL

## 2024-12-30 DIAGNOSIS — F41.1 GAD (GENERALIZED ANXIETY DISORDER): ICD-10-CM

## 2024-12-30 ASSESSMENT — PATIENT HEALTH QUESTIONNAIRE - PHQ9
SUM OF ALL RESPONSES TO PHQ QUESTIONS 1-9: 15
10. IF YOU CHECKED OFF ANY PROBLEMS, HOW DIFFICULT HAVE THESE PROBLEMS MADE IT FOR YOU TO DO YOUR WORK, TAKE CARE OF THINGS AT HOME, OR GET ALONG WITH OTHER PEOPLE: VERY DIFFICULT
SUM OF ALL RESPONSES TO PHQ QUESTIONS 1-9: 15

## 2024-12-30 ASSESSMENT — COLUMBIA-SUICIDE SEVERITY RATING SCALE - C-SSRS
2. HAVE YOU ACTUALLY HAD ANY THOUGHTS OF KILLING YOURSELF?: NO
1. SINCE LAST CONTACT, HAVE YOU WISHED YOU WERE DEAD OR WISHED YOU COULD GO TO SLEEP AND NOT WAKE UP?: NO
ATTEMPT SINCE LAST CONTACT: NO

## 2024-12-30 NOTE — PROGRESS NOTES
[facility] Primary Care: Integrated Behavioral Health    Integrated Behavioral Health   Mental Health & Addiction Services      Progress Note - Initial [facility] Visit     Patient Name: Yessi Claire    Date: December 30, 2024  Service Type: Individual   Visit Start Time:  955  Visit End Time:  10:25 AM   Attendees: Patient   Service Modality: In-person     [facility] Visit Activities (Refresh list every visit): NEW         DATA:     Interactive Complexity: No   Crisis: No     Assessments completed prior to this visit:     The following assessments were completed by patient for this visit:  PHQ9:       11/22/2023    10:40 AM 4/4/2024     7:26 AM 10/1/2024     1:21 PM 10/1/2024     1:30 PM 10/29/2024    11:38 AM 11/24/2024    12:13 PM 12/30/2024     9:32 AM   PHQ-9 SCORE   PHQ-9 Total Score MyChart 0   5 (Mild depression)  3 (Minimal depression) 15 (Moderately severe depression)   PHQ-9 Total Score 0 0 6 5 4 3  15        Patient-reported     GAD7:       11/18/2022    10:12 AM 11/22/2023    10:40 AM 9/29/2024    12:58 PM 10/28/2024     2:12 PM 11/20/2024     1:28 PM 12/11/2024    10:22 PM 12/28/2024     2:23 PM   OSCAR-7 SCORE   Total Score 0 (minimal anxiety) 0 (minimal anxiety) 5 (mild anxiety) 7 (mild anxiety) 5 (mild anxiety) 13 (moderate anxiety) 17 (severe anxiety)   Total Score 0 0 5 7  5  13  17        Patient-reported     CAGE-AID:       12/28/2024     2:25 PM   CAGE-AID Total Score   Total Score 0    Total Score MyChart 0 (A total score of 2 or greater is considered clinically significant)       Patient-reported     PROMIS 10-Global Health (all questions and answers displayed):       12/28/2024     2:25 PM   PROMIS 10   In general, would you say your health is: Very good   In general, would you say your quality of life is: Excellent   In general, how would you rate your physical health? Very good   In general, how would you rate your mental health, including your mood and your ability to  think? Poor   In general, how would you rate your satisfaction with your social activities and relationships? Excellent   In general, please rate how well you carry out your usual social activities and roles Excellent   To what extent are you able to carry out your everyday physical activities such as walking, climbing stairs, carrying groceries, or moving a chair? Completely   In the past 7 days, how often have you been bothered by emotional problems such as feeling anxious, depressed, or irritable? Often   In the past 7 days, how would you rate your fatigue on average? Mild   In the past 7 days, how would you rate your pain on average, where 0 means no pain, and 10 means worst imaginable pain? 2   In general, would you say your health is: 4   In general, would you say your quality of life is: 5   In general, how would you rate your physical health? 4   In general, how would you rate your mental health, including your mood and your ability to think? 1   In general, how would you rate your satisfaction with your social activities and relationships? 5   In general, please rate how well you carry out your usual social activities and roles. (This includes activities at home, at work and in your community, and responsibilities as a parent, child, spouse, employee, friend, etc.) 5   To what extent are you able to carry out your everyday physical activities such as walking, climbing stairs, carrying groceries, or moving a chair? 5   In the past 7 days, how often have you been bothered by emotional problems such as feeling anxious, depressed, or irritable? 4   In the past 7 days, how would you rate your fatigue on average? 2   In the past 7 days, how would you rate your pain on average, where 0 means no pain, and 10 means worst imaginable pain? 2   Global Mental Health Score 13    Global Physical Health Score 17    PROMIS TOTAL - SUBSCORES 30        Patient-reported     [place] Suicide Severity Rating Scale  (Lifetime/Recent)      9/30/2024    11:54 AM 10/25/2024     1:12 PM 10/25/2024     6:52 PM 12/20/2024     2:47 PM 12/20/2024     5:00 PM 12/20/2024     6:00 PM 12/20/2024     8:19 PM   [place] Suicide Severity Rating (Lifetime/Recent)   Q1 Wish to be Dead (Lifetime)   No    No   Q2 Non-Specific Active Suicidal Thoughts (Lifetime)   No    No   Q1 Wished to be Dead (Past Month) 0-->no 0-->no 0-->no 0-->no 0-->no 0-->no 0-->no   Q2 Suicidal Thoughts (Past Month) 0-->no 0-->no 0-->no 0-->no 0-->no 0-->no 0-->no   Q6 Suicide Behavior (Lifetime) 0-->no 0-->no 0-->no 0-->no 0-->no 0-->no 0-->no   Level of Risk per Screen no risks indicated no risks indicated no risks indicated no risks indicated no risks indicated no risks indicated no risks indicated   Actual Attempt (Lifetime)   N    N   Actual Attempt (Past 3 Months)   N       Has subject engaged in non-suicidal self-injurious behavior? (Lifetime)   N    N   Has subject engaged in non-suicidal self-injurious behavior? (Past 3 Months)   N       Interrupted Attempts (Lifetime)   N    N   Interrupted Attempts (Past 3 Months)   N       Aborted or Self-Interrupted Attempt (Lifetime)   N    N   Aborted or Self-Interrupted Attempt (Past 3 Months)   N       Preparatory Acts or Behavior (Lifetime)   N    N   Preparatory Acts or Behavior (Past 3 Months)   N       Calculated C-SSRS Risk Score (Lifetime/Recent)   No Risk Indicated    No Risk Indicated        Referral:   Patient was referred to [facility] by primary care provider.    Reason for referral: clarify behavioral health diagnosis and determine behavioral health treatment options.      [facility] introduced self and role. Discussed informed consent and limits to confidentiality.     Presenting Concerns/ Current Stressors:   Pt reports being full of anxiety and depression. Has had episodes of depression in late 40's and had a second episode of depression in late 50's with unknown triggers. Got lyme disease and related depression in 2012.  "Dealing with sciatica since March    Anxiety symptoms:  Unable to sit still   \"Jumo out of my skin\"  Waking up hot, feel it in the pit of stomach  Nervous feeling in stomach  Difficulty eating  Decreased appetite      Therapeutic Interventions:  Cognitive Behavioral Therapy (CBT): Identified and challenged maladaptive patterns of behavior and thinking that interfere with patients life and Assisted patient in developing coping strategies (e.g. more physical exercise, less internal focus, increase social involvement, more assertiveness, etc.).  Psycho-education: Provided psycho-education about patient's behavioral health condition and symptoms. Explained and reviewed treatment options.    Response to treatment interventions:   Patient was receptive to interventions utilized.  Patient was engaged in the therapy process.   Patient made a plan for changes in the next week.    Safety Issues and Plan for Safety and Risk Management:     Patient has had a history of suicidal ideation: passive thoughts of \"wanting it to end\" referring to anxiety and depressive symptoms, without actual thought of suicide    Patient denies current fears or concerns for personal safety.   Patient reports the following current or recent suicidal ideation or behaviors: current no SI.   Patient denies current or recent homicidal ideation or behaviors.   Patient denies current or recent self injurious behavior or ideation.   Patient denies other safety concerns.   A safety and risk management plan has been developed including: Patient consented to co-developed safety plan.  Safety and risk management plan was completed - see below.  Patient agreed to use safety plan should any safety concerns arise.  A copy was given to the patient.   Patient reports there are no firearms in the house.       ASSESSMENT:   Mental Status:     Appearance:   Appropriate    Eye Contact:   Good    Psychomotor Behavior: Agitated    Attitude:   Cooperative  Friendly " Pleasant   Orientation:   All   Speech Rate / Production: Normal/ Responsive   Volume:   Soft    Mood:    Anxious    Affect:    Worrisome    Thought Content:  Clear    Thought Form:  Goal Directed    Insight:    Good         Diagnostic Criteria:   Generalized Anxiety Disorder  A. Excessive anxiety and worry about a number of events or activities (such as work or school performance).   B. The person finds it difficult to control the worry.  C. Select 3 or more symptoms (required for diagnosis). Only one item is required in children.   - Restlessness or feeling keyed up or on edge.    - Muscle tension.    - Sleep disturbance (difficulty falling or staying asleep, or restless unsatisfying sleep).   D. The focus of the anxiety and worry is not confined to features of an Axis I disorder.  E. The anxiety, worry, or physical symptoms cause clinically significant distress or impairment in social, occupational, or other important areas of functioning.   F. The disturbance is not due to the direct physiological effects of a substance (e.g., a drug of abuse, a medication) or a general medical condition (e.g., hyperthyroidism) and does not occur exclusively during a Mood Disorder, a Psychotic Disorder, or a Pervasive Developmental Disorder.    - The aformentioned symptoms began 3 month(s) ago and occurs 7 days per week and is experienced as severe.  Major Depressive Disorder  A) Recurrent episode(s) - symptoms have been present during the same 2-week period and represent a change from previous functioning 5 or more symptoms (required for diagnosis)   - Depressed mood. Note: In children and adolescents, can be irritable mood.     - Diminished interest or pleasure in all, or almost all, activities.    - Significant weight loss when not dieting decrease in appetite.    - Psychomotor activity agitation.    - Fatigue or loss of energy.    - Recurrent thoughts of death (not just fear of dying), recurrent suicidal ideation without a  specific plan, or a suicide attempt or a specific plan for committing suicide.   B) The symptoms cause clinically significant distress or impairment in social, occupational, or other important areas of functioning  C) The episode is not attributable to the physiological effects of a substance or to another medical condition  D) The occurence of major depressive episode is not better explained by other thought / psychotic disorders  E) There has never been a manic episode or hypomanic episode        DSM5 Diagnoses: (Sustained by DSM5 Criteria Listed Above)     Diagnoses: 296.32 (F33.1) Major Depressive Disorder, Recurrent Episode, Moderate _ and With anxious distress  300.02 (F41.1) Generalized Anxiety Disorder     Psychosocial / Contextual Factors:  Anxiety is interfering with daily tasks - Pt is retired and living at home with        Collateral Reports Completed:   Routed note to PCP        PLAN: (Homework, other):     1. Patient was provided:  recommendation to schedule follow-up with [facility] recommendation to follow through on referrals information about mental health symptoms and treatment options      2. Provider recommended the following referrals: Assess referrals for individual therapy in next session .  Patient has psychiatry services through [facility] and is considering establishing a mental health therapist at that clinic or obtaining an [facility] referral. Will discuss in the follow up appointment     3. Suicide Risk and Safety Concerns were assessed for Yessi Claire    Safety Plan:   Patient agreed to follow safety plan   [facility] Safety Plan      Creation Date: 10/25/24 Last Update Date: 12/20/24      Step 1: Warning signs:    Warning Signs    Gabapentin withdrawal issues, increased anxiety, cry, worry, panic attacks    poor sleep and low appetite.      Step 2: Internal coping strategies - Things I can do to take my mind off my problems without contacting another person:    Strategies    deep  breathing exercise, meditation, taking walks, hiking, gardening    mowing the lawn, decorations, reading, watching TV, coloring, stretching,      Step 3: People and social settings that provide distraction:    Name Contact Information    Daljit [phone]       Places    outdoors      Step 4: People whom I can ask for help during a crisis:    Name Contact Information    Daljit Corona [phone]    My sisters       Step 5: Professionals or agencies I can contact during a crisis:    Clinician/Agency Name Phone Emergency Contact    [facility] crisis line [phone]       [facility] Emergency Department Emergency Department Address Emergency Department Phone    [facility]  [phone] 911      Suicide Prevention Lifeline Phone: Call or Text 486  Crisis Text Line: Text HOME to 589706     Step 6: Making the environment safer (plan for lethal means safety):   Joining a peer support group through [place], [place] to increase positive support system.  [facility] ([employer] Mental [employer]ness) improves the lives of children and adults with mental illnesses and their families by providing free classes on mental illnesses and support groups for adults with mental illnesses, parents and family members. For more information:  Phone: [phone]  Toll free: 9-985-XGHW-HELPS  Website: www.[employer].orghttp://www.[employer].org/      Optional: What is most important to me and worth living for?:   My  and family are important to me.     [facility] Safety Plan. Rossy Copeland and Damion Sadler. Used with permission of the authors.            Ha Barrera LMFT, [facility]   December 30, 2024    Answers submitted by the patient for this visit:  Patient Health Questionnaire (Submitted on 12/30/2024)  If you checked off any problems, how difficult have these problems made it for you to do your work, take care of things at home, or get along with other people?: Very difficult  PHQ9  TOTAL SCORE: 15  Patient Health Questionnaire (G7) (Submitted on 12/28/2024)  OSCAR 7 TOTAL SCORE: 17

## 2024-12-30 NOTE — PATIENT INSTRUCTIONS
Behavioral Activation Homework Assignment                  Overview:  Today, we discussed Behavioral Activation, a strategy to improve mood by intentionally scheduling and engaging in activities that bring a sense of accomplishment, pleasure, or connection. Behavioral Activation helps break the cycle of avoidance and inactivity that often comes with feeling down or stressed.    For the next week, your assignment is to identify and plan activities for each day that support your mental health. These can be small, manageable actions that align with your values or interests. Below is a template to guide you in tracking your activities and reflecting on their impact.      1. Physical Activity  Engage in movement that promotes physical health and boosts mood.  Examples:  Taking a walk or jogging.  Yoga, stretching, or a workout routine.  Gardening or light housework.  Benefits:  Increases endorphins and reduces depressive symptoms.  Enhances physical well-being.    2. Social Connections  Focus on building or maintaining meaningful relationships.  Examples:  Calling or texting a friend.  Spending time with family.  Joining a club, group, or support community.  Benefits:  Strengthens a sense of belonging.  Reduces isolation and loneliness.    3. Work or Education  Involve yourself in purposeful or skill-building activities.  Examples:  Completing small tasks at work or school.  Attending a class or workshop.  Exploring new professional or educational goals.  Benefits:  Provides structure and a sense of accomplishment.  Builds self-efficacy.    4. Recreation and Leisure  Engage in enjoyable, non-obligatory activities for relaxation or fun.  Examples:  Playing a game or hobby (e.g., painting, knitting, music).  Watching a favorite TV show or movie.  Exploring nature or a new place.  Benefits:  Increases pleasure and enjoyment.  Serves as a positive distraction from stressors.    5. Acts of Service or Volunteering  Participate  in activities that contribute to the well-being of others.  Examples:  Volunteering at a local shelter or organization.  Helping a neighbor or friend.  Random acts of kindness, such as writing a note of appreciation.  Benefits:  Enhances sense of purpose and fulfillment.  Shifts focus from internal struggles to external impact.    6. Self-Care  Incorporate activities that prioritize personal health and well-being.  Examples:  Taking a relaxing bath or doing a skincare routine.  Preparing and eating a nutritious meal.  Ensuring adequate sleep and rest.  Benefits:  Rebuilds energy reserves.  Reinforces self-worth.    7. Creativity and Expression  Engage in activities that allow for personal creativity and emotional expression.  Examples:  Writing in a journal, poetry, or stories.  Playing a musical instrument or dancing.  Crafting, painting, or other artistic endeavors.  Benefits:  Serves as an emotional outlet.  Encourages self-discovery and resilience.    8. Relaxation and Mindfulness  Practice techniques that reduce stress and enhance mindfulness.  Examples:  Meditation or deep breathing exercises.  Spending quiet time in nature.  Listening to soothing music.  Benefits:  Promotes calmness and emotional regulation.  Reduces tension and anxiety.    9. Family and Home Responsibilities  Complete tasks that enhance your environment or family life.  Examples:  Cleaning or organizing a room.  Spending quality time with children or pets.  Cooking or working on home improvement projects.  Benefits:  Builds a sense of accomplishment and control.  Strengthens family bonds.    10. Spirituality or Reflection  Explore practices that align with personal beliefs or values.  Examples:  Attending a Bahai service or spiritual group.  Practicing gratitude journaling.  Reflecting on personal values or goals.  Benefits:  Enhances connection to personal meaning and purpose.  Fosters inner peace and resilience.        Homework  Instructions:    Identify one or two specific activities for each day this week that bring a sense of accomplishment, pleasure, or connection (or a combination).  Complete the activity and note your mood before and mood after using a scale of 1-10 (1 = very low, 10 = very high).  Reflect briefly on how the activity felt and if it impacted your mood.      Template:  Please use this format to track your week:    Behavioral Activation Chart      Day:_____________   Activity:________________________________________________  Time Scheduled:______________  Completed? (Yes/No):__________  Mood Before (1-10):___________  Mood After (1-10):_____________  Comments:____________________________________________________________________________________________________________________________________________________________________________________________________________________________________________________________________________________________________________________________________________________________________________________________________________      Day:_____________   Activity:________________________________________________  Time Scheduled:______________  Completed? (Yes/No):__________  Mood Before (1-10):___________  Mood After (1-10):_____________  Comments:____________________________________________________________________________________________________________________________________________________________________________________________________________________________________________________________________________________________________________________________________________________________________________________________________________        Day:_____________   Activity:________________________________________________  Time Scheduled:______________  Completed? (Yes/No):__________  Mood Before (1-10):___________  Mood After  (1-10):_____________  Comments:____________________________________________________________________________________________________________________________________________________________________________________________________________________________________________________________________________________________________________________________________________________________________________________________________________      Day:_____________   Activity:________________________________________________  Time Scheduled:______________  Completed? (Yes/No):__________  Mood Before (1-10):___________  Mood After (1-10):_____________  Comments:____________________________________________________________________________________________________________________________________________________________________________________________________________________________________________________________________________________________________________________________________________________________________________________________________________          Example:  Day Activity Mood Before (1-10) Mood After (1-10) Reflection/Notes  Monday Took a 10-minute walk outside [address] [place] and enjoyed the fresh air  We will review your completed worksheet together during our next session to discuss what worked well and how to build on these activities moving forward. If you have any questions or need help planning activities, feel free to reach out before our follow-up.

## 2025-01-20 ENCOUNTER — OFFICE VISIT (OUTPATIENT)
Dept: BEHAVIORAL HEALTH | Facility: CLINIC | Age: 70
End: 2025-01-20
Payer: COMMERCIAL

## 2025-01-20 DIAGNOSIS — F32.5 MAJOR DEPRESSIVE DISORDER WITH SINGLE EPISODE, IN FULL REMISSION: Primary | ICD-10-CM

## 2025-01-20 DIAGNOSIS — F41.1 GAD (GENERALIZED ANXIETY DISORDER): ICD-10-CM

## 2025-01-20 PROCEDURE — 90832 PSYTX W PT 30 MINUTES: CPT | Performed by: MARRIAGE & FAMILY THERAPIST

## 2025-01-20 NOTE — PROGRESS NOTES
[facility]: Integrated Behavioral Health    Behavioral Health Clinician Progress Note   Mental Health & Addiction Services      Monday January 20, 2025    Patient Name: Yessi Claire       Service Type:  Individual   Service Location:  Face to Face in Clinic   Visit Start Time: 1:34 PM  Visit End Time:  2:00 PM   Session Length: 16 - 37    Attendees: Patient   Service Modality: In-person   Visit number: 3    [facility] Visit Activities (Refresh list every visit): [facility] Only     Date of Brief Diagnostic Assessment : 1/10/25  Treatment Plan Review Date: 6/10/25      DATA:    Extended Session (60+ minutes): No   Interactive Complexity: No   Crisis: No   [facility] Patient: No     Assessments completed prior to visit:   PHQ9:       11/22/2023    10:40 AM 4/4/2024     7:26 AM 10/1/2024     1:21 PM 10/1/2024     1:30 PM 10/29/2024    11:38 AM 11/24/2024    12:13 PM 12/30/2024     9:32 AM   PHQ-9 SCORE   PHQ-9 Total Score MyChart 0   5 (Mild depression)  3 (Minimal depression) 15 (Moderately severe depression)   PHQ-9 Total Score 0 0 6 5 4 3  15        Patient-reported     GAD7:       11/18/2022    10:12 AM 11/22/2023    10:40 AM 9/29/2024    12:58 PM 10/28/2024     2:12 PM 11/20/2024     1:28 PM 12/11/2024    10:22 PM 12/28/2024     2:23 PM   OSCAR-7 SCORE   Total Score 0 (minimal anxiety) 0 (minimal anxiety) 5 (mild anxiety) 7 (mild anxiety) 5 (mild anxiety) 13 (moderate anxiety) 17 (severe anxiety)   Total Score 0 0 5 7  5  13  17        Patient-reported     CAGE-AID:       12/28/2024     2:25 PM   CAGE-AID Total Score   Total Score 0    Total Score MyChart 0 (A total score of 2 or greater is considered clinically significant)       Patient-reported     [place] Suicide Severity Rating Scale (Lifetime/Recent)      9/30/2024    11:54 AM 10/25/2024     1:12 PM 10/25/2024     6:52 PM 12/20/2024     2:47 PM 12/20/2024     5:00 PM 12/20/2024     6:00 PM 12/20/2024     8:19 PM   [facility]  "Severity Rating (Lifetime/Recent)   Q1 Wish to be Dead (Lifetime)   No    No   Q2 Non-Specific Active Suicidal Thoughts (Lifetime)   No    No   Q1 Wished to be Dead (Past Month) 0-->no 0-->no 0-->no 0-->no 0-->no 0-->no 0-->no   Q2 Suicidal Thoughts (Past Month) 0-->no 0-->no 0-->no 0-->no 0-->no 0-->no 0-->no   Q6 Suicide Behavior (Lifetime) 0-->no 0-->no 0-->no 0-->no 0-->no 0-->no 0-->no   Level of Risk per Screen no risks indicated no risks indicated no risks indicated no risks indicated no risks indicated no risks indicated no risks indicated   Actual Attempt (Lifetime)   N    N   Actual Attempt (Past 3 Months)   N       Has subject engaged in non-suicidal self-injurious behavior? (Lifetime)   N    N   Has subject engaged in non-suicidal self-injurious behavior? (Past 3 Months)   N       Interrupted Attempts (Lifetime)   N    N   Interrupted Attempts (Past 3 Months)   N       Aborted or Self-Interrupted Attempt (Lifetime)   N    N   Aborted or Self-Interrupted Attempt (Past 3 Months)   N       Preparatory Acts or Behavior (Lifetime)   N    N   Preparatory Acts or Behavior (Past 3 Months)   N       Calculated C-SSRS Risk Score (Lifetime/Recent)   No Risk Indicated    No Risk Indicated     \"     Reason for Visit/Presenting Concern:  Anxiety    Current Stressors / Issues:   Waking up and doing deep breathing in the morning to do progressive muscle relaxation; depression has not improved: reporting continued crying     Therapeutic Interventions:  Psycho-education: Provided psycho-education about patient's behavioral health condition and symptoms. Explained and reviewed treatment options.    Response to treatment interventions:   Patient was receptive to interventions utilized.  Patient was engaged in the therapy process.       Progress on Treatment Objective(s) / Homework:   New Objective established this session - ACTION (Actively working towards change); Intervened by reinforcing change plan / affirming steps " "taken     Medication Review:   No changes to current psychiatric medication(s)     Medication Compliance:   Yes     Chemical Use Review:  Substance Use: Chemical use reviewed, no active concerns identified      Tobacco Use: No current tobacco use.       Assessment: Current Emotional / Mental Status (status of significant symptoms):    Risk status (Self / Other harm or suicidal ideation)   Patient has had a history of suicidal ideation: \"I just can't do this anymore\" thoughts without intent or plan    Patient denies current fears or concerns for personal safety.   Patient denies current or recent suicidal ideation or behaviors.   Patient denies current or recent homicidal ideation or behaviors.   Patient denies current or recent self injurious behavior or ideation.   Patient denies other safety concerns.   Recommended that patient call 911 or go to the local ED should there be a change in any of these risk factors      ASSESSMENT:   Mental Status:     Appearance:   Appropriate    Eye Contact:   Good    Psychomotor Behavior: Agitated  Restless    Attitude:   Cooperative    Orientation:   All   Speech Rate / Production: Emotional   Volume:   Soft    Mood:    Anxious  Sad    Affect:    Tearful   Thought Content:  Clear    Thought Form:  Coherent  Logical    Insight:    Good          Diagnoses:   Major Depressive Disorder  OSCAR    Collateral Reports Completed:   Routed note to PCP       Plan: (Homework, other):   Patient was provided No indications of CD issues  Patient was given information about behavioral services and encouraged to schedule a follow up appointment with the clinic [facility] in 1 week.         Ha HERNANDEZ, [facility]     _____________________________________________________________________________________________________________________________________                                              Individual Treatment Plan    Patient's Name: Yessi Claire   YOB: 1955  Date of Creation: " 1/20/25  Date Treatment Plan Last Reviewed/Revised: 1/20/25    DSM5 Diagnoses: 296.32 (F33.1) Major Depressive Disorder, Recurrent Episode, Moderate _ or 300.02 (F41.1) Generalized Anxiety Disorder  Psychosocial / Contextual Factors: Relationship Concerns and high anxiety distress  PROMIS (reviewed every 90 days):   The following assessments were completed by patient for this visit:  PHQ9:       11/22/2023    10:40 AM 4/4/2024     7:26 AM 10/1/2024     1:21 PM 10/1/2024     1:30 PM 10/29/2024    11:38 AM 11/24/2024    12:13 PM 12/30/2024     9:32 AM   PHQ-9 SCORE   PHQ-9 Total Score MyChart 0   5 (Mild depression)  3 (Minimal depression) 15 (Moderately severe depression)   PHQ-9 Total Score 0 0 6 5 4 3  15        Patient-reported     GAD7:       11/18/2022    10:12 AM 11/22/2023    10:40 AM 9/29/2024    12:58 PM 10/28/2024     2:12 PM 11/20/2024     1:28 PM 12/11/2024    10:22 PM 12/28/2024     2:23 PM   OSCAR-7 SCORE   Total Score 0 (minimal anxiety) 0 (minimal anxiety) 5 (mild anxiety) 7 (mild anxiety) 5 (mild anxiety) 13 (moderate anxiety) 17 (severe anxiety)   Total Score 0 0 5 7  5  13  17        Patient-reported     CAGE-AID:       12/28/2024     2:25 PM   CAGE-AID Total Score   Total Score 0    Total Score MyChart 0 (A total score of 2 or greater is considered clinically significant)       Patient-reported     PROMIS 10-Global Health (all questions and answers displayed):       12/28/2024     2:25 PM   PROMIS 10   In general, would you say your health is: Very good   In general, would you say your quality of life is: Excellent   In general, how would you rate your physical health? Very good   In general, how would you rate your mental health, including your mood and your ability to think? Poor   In general, how would you rate your satisfaction with your social activities and relationships? Excellent   In general, please rate how well you carry out your usual social activities and roles Excellent   To what  extent are you able to carry out your everyday physical activities such as walking, climbing stairs, carrying groceries, or moving a chair? Completely   In the past 7 days, how often have you been bothered by emotional problems such as feeling anxious, depressed, or irritable? Often   In the past 7 days, how would you rate your fatigue on average? Mild   In the past 7 days, how would you rate your pain on average, where 0 means no pain, and 10 means worst imaginable pain? 2   In general, would you say your health is: 4   In general, would you say your quality of life is: 5   In general, how would you rate your physical health? 4   In general, how would you rate your mental health, including your mood and your ability to think? 1   In general, how would you rate your satisfaction with your social activities and relationships? 5   In general, please rate how well you carry out your usual social activities and roles. (This includes activities at home, at work and in your community, and responsibilities as a parent, child, spouse, employee, friend, etc.) 5   To what extent are you able to carry out your everyday physical activities such as walking, climbing stairs, carrying groceries, or moving a chair? 5   In the past 7 days, how often have you been bothered by emotional problems such as feeling anxious, depressed, or irritable? 4   In the past 7 days, how would you rate your fatigue on average? 2   In the past 7 days, how would you rate your pain on average, where 0 means no pain, and 10 means worst imaginable pain? 2   Global Mental Health Score 13    Global Physical Health Score 17    PROMIS TOTAL - SUBSCORES 30        Patient-reported     [place] Suicide Severity Rating Scale (Lifetime/Recent)      9/30/2024    11:54 AM 10/25/2024     1:12 PM 10/25/2024     6:52 PM 12/20/2024     2:47 PM 12/20/2024     5:00 PM 12/20/2024     6:00 PM 12/20/2024     8:19 PM   [place] Suicide Severity Rating (Lifetime/Recent)   Q1  Wish to be Dead (Lifetime)   No    No   Q2 Non-Specific Active Suicidal Thoughts (Lifetime)   No    No   Q1 Wished to be Dead (Past Month) 0-->no 0-->no 0-->no 0-->no 0-->no 0-->no 0-->no   Q2 Suicidal Thoughts (Past Month) 0-->no 0-->no 0-->no 0-->no 0-->no 0-->no 0-->no   Q6 Suicide Behavior (Lifetime) 0-->no 0-->no 0-->no 0-->no 0-->no 0-->no 0-->no   Level of Risk per Screen no risks indicated no risks indicated no risks indicated no risks indicated no risks indicated no risks indicated no risks indicated   Actual Attempt (Lifetime)   N    N   Actual Attempt (Past 3 Months)   N       Has subject engaged in non-suicidal self-injurious behavior? (Lifetime)   N    N   Has subject engaged in non-suicidal self-injurious behavior? (Past 3 Months)   N       Interrupted Attempts (Lifetime)   N    N   Interrupted Attempts (Past 3 Months)   N       Aborted or Self-Interrupted Attempt (Lifetime)   N    N   Aborted or Self-Interrupted Attempt (Past 3 Months)   N       Preparatory Acts or Behavior (Lifetime)   N    N   Preparatory Acts or Behavior (Past 3 Months)   N       Calculated C-SSRS Risk Score (Lifetime/Recent)   No Risk Indicated    No Risk Indicated        Referral / Collaboration:  The following referral(s) will be initiated: [facility] or [facility] higher level of care to meet patient needs .    Anticipated number of session for this episode of care:  6-9 sessions  Anticipation frequency of session: Weekly  Anticipated Duration of each session: 16-37 minutes  Treatment plan will be reviewed in 90 days or when goals have been changed.       MeasurableTreatment Goal(s) related to diagnosis / functional impairment(s)  Goal 1: Patient will improve management of anxiety symptoms as evidenced by reduced frequency, intensity and duration of anxiety symptoms. learn and implement coping skills that result in a reduction of anxiety and nervousness. improve daily functioning. recognize contributing factors of anxiety and identify ways  to intervene. reduce frequency and intensity of depressive episode(s). establish a consistent sleep routine and practicing use of good sleep hygiene skills. increase engagement in enjoyable activities. increase social interactions and activities.    I will know I've met my goal when I can feel better.      Status: New - Date: 1/20/25      Intervention(s)  [facility] will Cognitive Behavioral Therapy (CBT): Identify behavioral changes that can be made to move towards treatment goals.  and Work with patient to recognize irrational thought processes and identify more adaptive thoughts.       MeasurableTreatment Goal(s) related to diagnosis / functional impairment(s)  Goal 2: Patient will develop effective distress tolerance skills. learn and regularly practice emotions regulations skills. reduce frequency of suicidal ideation. recognize warning signs and utilize safety plan and coping skills to intervene.    I will know I've met my goal when I feel better.      Status: New - Date: 1/20/25      Intervention(s)  [facility] will Safety: Co-develop safety plan for patient to follow. Review and update safety plan with patient. Assess risk of harm and recommend a higher level of care due to persistent anxiety symptoms.    Patient has reviewed and agreed to the above plan.     Written by  Ha HERNANDEZ [facility]

## 2025-01-20 NOTE — PATIENT INSTRUCTIONS
Plan:     1) Receive call from [facility] regarding programs and explore options for Intensive Outpatient Programming    2) If through [facility]: Complete Comprehensive Diagnostic Assessment once a program is selected     If program is outside [facility], they will complete Diagnostic Assessment    If you need crisis services go to DAVID at [facility] or call [phone] or [facility] crisis    [address], [place] [address]

## 2025-01-25 ASSESSMENT — ANXIETY QUESTIONNAIRES
8. IF YOU CHECKED OFF ANY PROBLEMS, HOW DIFFICULT HAVE THESE MADE IT FOR YOU TO DO YOUR WORK, TAKE CARE OF THINGS AT HOME, OR GET ALONG WITH OTHER PEOPLE?: SOMEWHAT DIFFICULT
GAD7 TOTAL SCORE: 6
6. BECOMING EASILY ANNOYED OR IRRITABLE: NOT AT ALL
GAD7 TOTAL SCORE: 6
1. FEELING NERVOUS, ANXIOUS, OR ON EDGE: NEARLY EVERY DAY
2. NOT BEING ABLE TO STOP OR CONTROL WORRYING: SEVERAL DAYS
7. FEELING AFRAID AS IF SOMETHING AWFUL MIGHT HAPPEN: NOT AT ALL
4. TROUBLE RELAXING: SEVERAL DAYS
5. BEING SO RESTLESS THAT IT IS HARD TO SIT STILL: SEVERAL DAYS
IF YOU CHECKED OFF ANY PROBLEMS ON THIS QUESTIONNAIRE, HOW DIFFICULT HAVE THESE PROBLEMS MADE IT FOR YOU TO DO YOUR WORK, TAKE CARE OF THINGS AT HOME, OR GET ALONG WITH OTHER PEOPLE: SOMEWHAT DIFFICULT
3. WORRYING TOO MUCH ABOUT DIFFERENT THINGS: NOT AT ALL

## 2025-01-26 ASSESSMENT — PATIENT HEALTH QUESTIONNAIRE - PHQ9
10. IF YOU CHECKED OFF ANY PROBLEMS, HOW DIFFICULT HAVE THESE PROBLEMS MADE IT FOR YOU TO DO YOUR WORK, TAKE CARE OF THINGS AT HOME, OR GET ALONG WITH OTHER PEOPLE: SOMEWHAT DIFFICULT
SUM OF ALL RESPONSES TO PHQ QUESTIONS 1-9: 8
SUM OF ALL RESPONSES TO PHQ QUESTIONS 1-9: 8

## 2025-01-27 ENCOUNTER — TELEPHONE (OUTPATIENT)
Dept: BEHAVIORAL HEALTH | Facility: CLINIC | Age: 70
End: 2025-01-27
Payer: COMMERCIAL

## 2025-01-27 ENCOUNTER — VIRTUAL VISIT (OUTPATIENT)
Dept: BEHAVIORAL HEALTH | Facility: CLINIC | Age: 70
End: 2025-01-27
Payer: COMMERCIAL

## 2025-01-27 DIAGNOSIS — F41.1 GAD (GENERALIZED ANXIETY DISORDER): ICD-10-CM

## 2025-01-27 DIAGNOSIS — F33.1 MAJOR DEPRESSIVE DISORDER, RECURRENT EPISODE, MODERATE (H): Primary | ICD-10-CM

## 2025-01-27 PROCEDURE — 99207 PR NO CHARGE LOS: CPT | Mod: 95 | Performed by: SOCIAL WORKER

## 2025-01-27 ASSESSMENT — COLUMBIA-SUICIDE SEVERITY RATING SCALE - C-SSRS
2. HAVE YOU ACTUALLY HAD ANY THOUGHTS OF KILLING YOURSELF IN THE PAST MONTH?: NO
1. IN THE PAST MONTH, HAVE YOU WISHED YOU WERE DEAD OR WISHED YOU COULD GO TO SLEEP AND NOT WAKE UP?: NO
2. HAVE YOU ACTUALLY HAD ANY THOUGHTS OF KILLING YOURSELF LIFETIME?: NO
1. IN THE PAST MONTH, HAVE YOU WISHED YOU WERE DEAD OR WISHED YOU COULD GO TO SLEEP AND NOT WAKE UP?: NO
6. HAVE YOU EVER DONE ANYTHING, STARTED TO DO ANYTHING, OR PREPARED TO DO ANYTHING TO END YOUR LIFE?: NO

## 2025-01-27 NOTE — CONFIDENTIAL NOTE
"[facility]        PATIENT'S NAME: Yessi Claire  PREFERRED NAME: Whitney  PRONOUNS: She She  MRN: [id number]  : 1955  ADDRESS: [address] [place] [address] [address] [place] [address]  [facility]  [id number]  DATE OF SERVICE: 25  START TIME: 1130  END TIME: 1230  PREFERRED PHONE: [phone]  May we leave a program related message: Yes  EMERGENCY CONTACT: as not obtained.   SERVICE MODALITY:  Video Visit:      Provider verified identity through the following two step process.  Patient provided:  Patient     Telemedicine Visit: The patient's condition can be safely assessed and treated via synchronous audio and visual telemedicine encounter.      Reason for Telemedicine Visit: Patient has requested telehealth visit    Originating Site (Patient Location): Patient's home    Distant Site (Provider Location): Provider Remote Setting- Home Office    Consent:  The patient/guardian has verbally consented to: the potential risks and benefits of telemedicine (video visit) versus in person care; bill my insurance or make self-payment for services provided; and responsibility for payment of non-covered services.     Patient would like the video invitation sent by:  Send to e-mail at: [email or web address]    Mode of Communication:  Video Conference via   Amwell  Distant Location (Provider):  Off-site    As the provider I attest to compliance with applicable laws and regulations related to telemedicine.    UNIVERSAL ADULT Mental Health DIAGNOSTIC ASSESSMENT    Identifying Information:  Patient is a 69 year old,   individual.  Patient was referred for an assessment by  [facility] primary care clinic fro Diagnostic Assessment for IOP 55 plus.  Patient attended the session alone.    Chief Complaint:   The reason for seeking services at this time is: \"Ways to cope with depression and anxiety\".  The problem(s) began 24.    Patient has attempted to resolve these concerns in the past through " Individual therapy through her [facility] at her primary clinic.  Also, being followed by psychiatric nurse practitioner for medication management .    Social/Family History:  Patient reported they grew up in other [facility].  They were raised by biological parents.  Parents were always together.  Patient reported that their childhood was good.  Patient described their current relationships with family of origin as good.     The patient describes their cultural background as .  Cultural influences and impact on patient's life structure, values, norms, and healthcare: [place]  Contextual influences on patient's health include:  none indicated.   These factors will be addressed in the Preliminary Treatment plan. Patient identified their preferred language to be English. Patient reported they does not need the assistance of an  or other support involved in therapy.     Patient reported had no significant delays in developmental tasks.   Patient's highest education level was associate degree / vocational certificate.  Patient identified the following learning problems: none reported.  Modifications will not be used to assist communication in therapy.  Patient reports they are  able to understand written materials.    Patient reported the following relationship history.  Patient's current relationship status is . Patient identified their sexual orientation as heterosexual.  Patient reported having 2 child(baljinder). Patient identified siblings; adult child; friends; spouse as part of their support system.  Patient identified the quality of these relationships as stable and meaningful.     Patient's current living/housing situation involves staying in own home/apartment.  The immediate members of family and household include Daljit Rakan,Spouse and they report that housing is stable.    Patient is currently retired.  Patient reports their finances are obtained through other.  Patient does not identify finances as a current stressor.      Patient reported that they have not been involved with the legal system. Patient does not report being under probation/ parole/ jurisdiction.    Patient's Strengths and Limitations:  Patient identified the following strengths or resources that will help them succeed in treatment: commitment to health and well being, intelligence, and motivation. Things that may interfere with the patient's success in treatment include: none identified.     Assessments:  The following assessments were completed by patient for this visit:  PHQ9:       4/4/2024     7:26 AM 10/1/2024     1:21 PM 10/1/2024     1:30 PM 10/29/2024    11:38 AM 11/24/2024    12:13 PM 12/30/2024     9:32 AM 1/26/2025    10:18 PM   PHQ-9 SCORE   PHQ-9 Total Score MyChart   5 (Mild depression)  3 (Minimal depression) 15 (Moderately severe depression) 8 (Mild depression)   PHQ-9 Total Score 0 6 5 4 3  15  8        Patient-reported     GAD2:       12/28/2024     2:23 PM 1/25/2025     4:21 PM   OSCAR-2   Feeling nervous, anxious, or on edge 3 3   Not being able to stop or control worrying 3 1   OSCAR-2 Total Score 6  4        Patient-reported     GAD7:       11/22/2023    10:40 AM 9/29/2024    12:58 PM 10/28/2024     2:12 PM 11/20/2024     1:28 PM 12/11/2024    10:22 PM 12/28/2024     2:23 PM 1/25/2025     4:21 PM   OSCAR-7 SCORE   Total Score 0 (minimal anxiety) 5 (mild anxiety) 7 (mild anxiety) 5 (mild anxiety) 13 (moderate anxiety) 17 (severe anxiety) 6 (mild anxiety)   Total Score 0 5 7  5  13  17  6        Patient-reported     CAGE-AID:       12/28/2024     2:25 PM   CAGE-AID Total Score   Total Score 0    Total Score MyChart 0 (A total score of 2 or greater is considered clinically significant)       Patient-reported     PROMIS 10-Global Health (all questions and answers displayed):       12/28/2024     2:25 PM 1/25/2025     4:24 PM   PROMIS 10   In general, would you say your health is: Very good  Excellent   In general, would you say your quality of life is: Excellent Excellent   In general, how would you rate your physical health? Very good Excellent   In general, how would you rate your mental health, including your mood and your ability to think? Poor Good   In general, how would you rate your satisfaction with your social activities and relationships? Excellent Excellent   In general, please rate how well you carry out your usual social activities and roles Excellent Excellent   To what extent are you able to carry out your everyday physical activities such as walking, climbing stairs, carrying groceries, or moving a chair? Completely Completely   In the past 7 days, how often have you been bothered by emotional problems such as feeling anxious, depressed, or irritable? Often Often   In the past 7 days, how would you rate your fatigue on average? Mild None   In the past 7 days, how would you rate your pain on average, where 0 means no pain, and 10 means worst imaginable pain? 2 1   In general, would you say your health is: 4 5   In general, would you say your quality of life is: 5 5   In general, how would you rate your physical health? 4 5   In general, how would you rate your mental health, including your mood and your ability to think? 1 3   In general, how would you rate your satisfaction with your social activities and relationships? 5 5   In general, please rate how well you carry out your usual social activities and roles. (This includes activities at home, at work and in your community, and responsibilities as a parent, child, spouse, employee, friend, etc.) 5 5   To what extent are you able to carry out your everyday physical activities such as walking, climbing stairs, carrying groceries, or moving a chair? 5 5   In the past 7 days, how often have you been bothered by emotional problems such as feeling anxious, depressed, or irritable? 4 4   In the past 7 days, how would you rate your fatigue  on average? 2 1   In the past 7 days, how would you rate your pain on average, where 0 means no pain, and 10 means worst imaginable pain? 2 1   Global Mental Health Score 13  15    Global Physical Health Score 17  19    PROMIS TOTAL - SUBSCORES 30  34        Patient-reported     [place] Suicide Severity Rating Scale (Lifetime/Recent)      10/25/2024     1:12 PM 10/25/2024     6:52 PM 12/20/2024     2:47 PM 12/20/2024     5:00 PM 12/20/2024     6:00 PM 12/20/2024     8:19 PM 1/27/2025    12:00 PM   [employer] Suicide Severity Rating (Lifetime/Recent)   Q1 Wish to be Dead (Lifetime)  No    No No   Q2 Non-Specific Active Suicidal Thoughts (Lifetime)  No    No No   Q1 Wished to be Dead (Past Month) 0-->no 0-->no 0-->no 0-->no 0-->no 0-->no 0-->no   Q2 Suicidal Thoughts (Past Month) 0-->no 0-->no 0-->no 0-->no 0-->no 0-->no 0-->no   Q6 Suicide Behavior (Lifetime) 0-->no 0-->no 0-->no 0-->no 0-->no 0-->no 0-->no   Level of Risk per Screen no risks indicated no risks indicated no risks indicated no risks indicated no risks indicated no risks indicated no risks indicated   Actual Attempt (Lifetime)  N    N    Actual Attempt (Past 3 Months)  N        Has subject engaged in non-suicidal self-injurious behavior? (Lifetime)  N    N    Has subject engaged in non-suicidal self-injurious behavior? (Past 3 Months)  N        Interrupted Attempts (Lifetime)  N    N    Interrupted Attempts (Past 3 Months)  N        Aborted or Self-Interrupted Attempt (Lifetime)  N    N    Aborted or Self-Interrupted Attempt (Past 3 Months)  N        Preparatory Acts or Behavior (Lifetime)  N    N    Preparatory Acts or Behavior (Past 3 Months)  N        Calculated C-SSRS Risk Score (Lifetime/Recent)  No Risk Indicated    No Risk Indicated        Personal and Family Medical History:  Patient does not report a family history of mental health concerns.  Patient reports family history includes Anxiety Disorder in her sister; C.A.D. (age of onset: 80) in  her father; Depression in her sister; Diabetes in her sister; Hyperlipidemia in her mother and sister; Thyroid Disease in her mother, sister, sister, and sister..      Patient reported the following previous mental health diagnoses:   Generalized Anxiety Disorder and Major Depressive Disorder.  Patient reports their primary mental health symptoms include:   feeling nervous and anxious and on edge, not being able to control worry, trouble relaxing,  little interest and pleasure in doing things, feeling down, feeling tired, poor appetite, feeling bad about self and isolation and these do impact her ability to function.   Patient received mental health services in the past: none until seen by [facility] at primary care clinic.  Psychiatric Hospitalizations: Psychiatric Hospitalizations: None. when   Patient denies a history of civil commitment.  Current mental health services/providers include:    Had been staying  [facility].      Patient has had a physical exam to rule out medical causes for current symptoms.  Date of last physical exam was within the past year. Symptoms have developed since last physical exam and client was encouraged to follow up with PCP.  . The patient has a non-[place] Primary Care Provider. Their PCP is GLENN Dwyer MD.  Patient reports no current medical concerns.  Patient denies any issues with pain. Patient denies pregnancy.  There are not significant appetite / nutritional concerns / weight changes.  Patient does not report a history of an eating disorder.  Patient does not report a history of head injury / trauma / cognitive impairment.      Current Outpatient Medications   Medication Sig Dispense Refill    ASPIRIN 81 MG OR TABS Every 3rd day      CALCIUM-VITAMIN D PO Take by mouth daily      famotidine (PEPCID) 10 MG tablet Take 10 mg by mouth daily      fish oil-omega-3 fatty acids 500 MG capsule       hydrOXYzine HCl (ATARAX) 25 MG tablet Take 1 tablet (25 mg) by mouth 2 times  daily as needed for itching or anxiety. 30 tablet 0    levothyroxine (SYNTHROID/LEVOTHROID) 75 MCG tablet Take 1 tablet (75 mcg) by mouth daily. 90 tablet 1    LORazepam (ATIVAN) 0.5 MG tablet Take 1 tablet (0.5 mg) by mouth every 6 hours as needed for anxiety. 15 tablet 0    Multiple Vitamin (MULTI-VITAMIN PO) Take by mouth as needed.      Naproxen Sodium (ALEVE PO) Take 220 mg by mouth as needed for moderate pain      OLANZapine zydis (ZYPREXA) 5 MG ODT Take 1 tablet (5 mg) by mouth at bedtime. 20 tablet 0    rosuvastatin (CRESTOR) 10 MG tablet Take 1 tablet (10 mg) by mouth daily. 90 tablet 2    sertraline (ZOLOFT) 100 MG tablet Take 1 tablet (100 mg) by mouth daily.      SPIKEVAX 50 MCG/0.5ML injection       venlafaxine (EFFEXOR XR) 37.5 MG 24 hr capsule Take 1 capsule (37.5 mg) by mouth daily. 7 capsule 0    venlafaxine (EFFEXOR XR) 75 MG 24 hr capsule Take 1 capsule (75 mg) by mouth daily. Start after completing 37.5 mg dose 30 capsule 0     No current facility-administered medications for this visit.     Patient stated that she is off the gabapentin.    Is weaning off  Sertraline .    Is no longer on the hydroxyzine.   MD added risperidone.    Medication Adherence:  Patient reports taking. taking psychiatric medications as prescribed.   Patient is able to self-administer medications.      Patient Allergies:    Allergies   Allergen Reactions    Gabapentin      Sensitive to gabapentin, and had withdrawal symptoms        Medical History:    Past Medical History:   Diagnosis Date    Abnormal Pap smear 01/01/1991    colposcopy    GERD (gastroesophageal reflux disease)     Hashimoto's thyroiditis     biopsy left nodule    Hypothyroidism     IBS (irritable bowel syndrome)     Low back pain     episodic: MRI mild degenerative changes 2012    Lyme meningitis     presented with facial weakness    Major depression 05/01/2012    Uterine fibroid      Substance Use:   Patient did not report a family history of substance  use concerns; see medical history section for details.  Patient has not received chemical dependency treatment in the past.  Patient has not ever been to detox.      Patient is not currently receiving any chemical dependency treatment.    Based on the CAGE score of 0 and clinical interview there  are not indications of drug or alcohol abuse.    Significant Losses / Trauma / Abuse / Neglect Issues:   Patient did not did not serve in the .  There are not indications or report of significant loss, trauma, abuse or neglect issues related to: are no indications and client denies any losses, trauma, abuse, or neglect concerns.  Patient has not been a victim of exploitation.  Concerns for possible neglect are not present.    Safety Assessment:   Patient denies current or past homicidal ideation and behaviors.  Patient denies current or past suicidal ideation and behaviors.  Patient denies current or past self-injurious behaviors.  Patient denied risk behaviors associated with substance use.  Patient denies any high risk behaviors associated with mental health symptoms.  Patient denied current or past personal safety concerns.    Patient denies past of current/recent assaultive behaviors.    Patient denied a history of sexual assault behaviors.     Patient reports there are not firearms in the house.    Patient reports the following protective factors:  forward or future oriented thinking; dedication to family or friends; safe and stable environment; regular sleep; effectively controls impulses; regular physical activity; sense of belonging; purpose; secure attachment; help seeking behaviors when distressed; abstinence from substances; adherence with prescribed medication; agreement to use safety plan; living with other people; daily obligations; structured day; effective problem solving skills; commitment to well being; sense of meaning; positive social skills; healthy fear of risky behaviors or pain; financial  stability; strong sense of self worth or esteem; sense of personal control or determination    Risk Plan:  See Recommendations for Safety and Risk Management Plan    Review of Symptoms per patient report:   Depression: Lack of interest or pleasure in doing things, Feeling sad, down, or depressed, Change in energy level, Ruminations, and Frequent crying  Sendy:  No Symptoms  Psychosis: No Symptoms  Anxiety: Excessive worry, Nervousness, Social anxiety, and Ruminations  Panic:  No symptoms  Post Traumatic Stress Disorder:  No Symptoms   Eating Disorder: No Symptoms  ADD / ADHD:  No symptoms  Conduct Disorder: No symptoms  Autism Spectrum Disorder: No symptoms  Obsessive Compulsive Disorder: No Symptoms  Personality Disorders:  None indicated.    Patient reports the following compulsive behaviors and treatment history: None indicated.    Diagnostic Criteria:   Generalized Anxiety Disorder  A. Excessive anxiety and worry about a number of events or activities (such as work or school performance).    - Restlessness or feeling keyed up or on edge.    - Muscle tension.    - Sleep disturbance (difficulty falling or staying asleep, or restless unsatisfying sleep).   E. The anxiety, worry, or physical symptoms cause clinically significant distress or impairment in social, occupational, or other important areas of functioning.  Major Depressive Disorder  A) Recurrent episode(s) - symptoms have been present during the same 2-week period and represent a change from previous functioning 5 or more symptoms (required for diagnosis)   - Depressed mood. Note: In children and adolescents, can be irritable mood.     - Diminished interest or pleasure in all, or almost all, activities.    - Significant weight gaindecrease in appetite.    - Decreased sleep.    - Fatigue or loss of energy.   B) The symptoms cause clinically significant distress or impairment in social, occupational, or other important areas of functioning    Functional  Status:  Patient reports the following functional impairments:  self-care.     Adult  Programmatic care:  Current LOCUS was assigned and patient needs the following level of care based on score 15.    1. Does the patient have a history of vulnerability such as being teased, picked on, or other indications of potential safety issues with other residents?  No    2. Does this patient have a history of being the victim of abuse? No history of abuse reported or documented.    3. Does this patient have a history of victimizing others? No     4. Does the patient have a history of boundary violations?  No.    5. Does the patient have a history of other sexual acting out behaviors (e.g grooming)?   No    6. Does the patient have a history of threats to self or others? Fire setting, running away or other self-injurious behaviors?    No    7. Does the patient s history indicate the need for special precautions or particular staffing patterns in the facility?  No      NOTE: If this screening indicates that the patient is at risk to harm self or others, notify staff at referral location.    Clinical Summary:  1. Psychosocial Factors:   anxiety is interfering with daily tasks the patient is retired living at home with .  Cultural and Contextual Factors:   None indicated  2. Principal DSM5 Diagnoses  (Sustained by DSM5 Criteria Listed Above):     296.32 (F33.1) Major Depressive Disorder, Recurrent Episode, Moderate _ and With anxious distress  300.02 (F41.1) Generalized Anxiety Disorder   3. Other Diagnoses that is relevant to services:   None indicated  4. Provisional Diagnosis:  None indicated.  5. Prognosis: Expect Improvement.  6. Likely consequences of symptoms if not treated:  worsen if not treated.  7. Patient strengths include:  caring, empathetic, has a previous history of therapy, insightful, and intelligent .     Recommendations:     1. Plan for Safety and Risk Management:   Safety and Risk: Recommended that  "patient call 911 or go to the local ED should there be a change in any of these risk factors        Report to child / adult protection services was NA.     2. Patient's identified  issues with anxiety that are inhibiting her  optimal functional level.    3. Initial Treatment will focus on:    Depressed Mood -    Anxiety -   .     4. Resources/Service Plan:    services are not indicated.   Modifications to assist communication are not indicated.   Additional disability accommodations are not indicated.      5. Collaboration:   Collaboration / coordination of treatment will be initiated with the following  support professionals: Current  psychiatric nurse practitioner.  6.  Referrals:   The following referral(s) will be initiated: Outpatient Mental [facility].       A Release of Information has been obtained for the following: psychiatry    Clinical Substantiation/medical necessity for the above recommendations:   Patient is a 69 year old,   individual.  Patient was referred for an assessment by [facility] primary care clinic to be assessed for IOP 55 Plus.   Patient indicated the reason for seeking treatment is to find \"ways to cope with depression and anxiety.\"   Patient indicated that  her anxiety and depression had gotten so bad she did not even leave the house all the way through [place]. Patient has attempted to resolve these concerns in the past through Individual therapy through her [facility] at her primary clinic and a number of medication adjustments by Psychiatric Nurse Practitioner.  Patient reports that she is beginning to feel better and her anxiety is lessening.  Patient would like to see if her symptoms resolved through individual therapy and medication adjustments. Patient will benefit from Individual Therapy to reduce the chronicity and intensity of symptoms which have impacted daily functioning.       7. DEBORAH:    DEBORAH:  Discussed the general effects of drugs and alcohol on health and " well-being. Provider gave patient printed information about the  effects of chemical use on their health and well being. Recommendations:  None indicated.     8. Records:   These were reviewed at time of assessment.   Information in this assessment was obtained from the medical record and  provided by patient who is a good historian.    Patient will have open access to their mental health medical record.    9.   Interactive Complexity: No    10. Safety Plan:   [facility] Safety Plan      Creation Date: 10/25/24 Last Update Date: 12/20/24      Step 1: Warning signs:    Warning Signs    Gabapentin withdrawal issues, increased anxiety, cry, worry, panic attacks    poor sleep and low appetite.      Step 2: Internal coping strategies - Things I can do to take my mind off my problems without contacting another person:    Strategies    deep breathing exercise, meditation, taking walks, hiking, gardening    mowing the lawn, decorations, reading, watching TV, coloring, stretching,      Step 3: People and social settings that provide distraction:    Name Contact Information    Daljit [phone]       Places    outdoors      Step 4: People whom I can ask for help during a crisis:    Name Contact Information    Daljit [phone]    My sisters       Step 5: Professionals or agencies I can contact during a crisis:    Clinician/Agency Name Phone Emergency Contact    [facility] crisis line [phone]       Local Emergency Department Emergency Department Address Emergency Department Phone    [facility]  [phone] 911      Suicide Prevention Lifeline Phone: Call or Text 865  Crisis Text Line: Text HOME to 312399     Step 6: Making the environment safer (plan for lethal means safety):   Joining a peer support group through [employer] [place] to increase positive support system.  [facility] ([employer] [place] [employer] Mental [employer]ness) improves the lives of children and adults with mental  illnesses and their families by providing free classes on mental illnesses and support groups for adults with mental illnesses, parents and family members. For more information:  Phone: 791.231.9898  Toll free: 1-530-UGOL-[place]  Website: www.FreshOffice.orghttp://www.FreshOffice.org/      Optional: What is most important to me and worth living for?:   My  and family are important to me.     [facility] Safety Plan. Rossy Copeland and Damion Sadler. Used with permission of the authors.          Provider Name/ Credentials:  Bre ETIENNE  2025    LOCUS Worksheet     Name: Yessi Claire MRN: [id number]    : 1955      Gender:  female    PMI:  N/a   Provider Name: Bre ETIENNE   Provider NPI:  [id number]    Actual level of Care Provided:  out-pt.   Diagnostic assessment    Service(s) receiving or referred to:  individual Therapy    Reason for Variance:   Patient is  choosing to participate in individual.     Rating completed by: Bre ETIENNE      I. Risk of Harm:   1      Minimal Risk of Harm    II. Functional Status:   3      Moderate Impairment    III. Co-Morbidity:   3      Significant Co-Morbidity    IV - A. Recovery Environment - Level of Stress:   1      Low Stress Environment    IV - B. Recovery Environment - Level of Support:   2      Supportive Environment    V. Treatment and Recovery History:   3      Moderate to Equivocal Response to Treatment and Recovery Management    VI. Engagement and Recovery Project:   2      Positive Engagement and Recovery       15 Composite Score    Level of Care Recommendation:   14 to 16       Low Intensity Community Based Services

## 2025-01-27 NOTE — TELEPHONE ENCOUNTER
Summary of Patient Care Communication Handoff to Patient Navigator Coordinator    PATIENT'S NAME: Yessi Claire  MRN:   [id number]  :   1955    DATE OF SERVICE: 25    Referral Needed: No    Is the patient coming from an inpatient unit? No    What program is this referral for?   Patient does not meet criteria under [facility] for IOP 55 plus at this time. Nor does she want to participate. Would loke to see if her symptoms resolve after current medication changes.

## 2025-01-27 NOTE — TELEPHONE ENCOUNTER
**Patient Navigator Follow Up**    1/27/2025;    What program is this referral for?   Patient does not meet criteria under [facility] for IOP 55 plus at this time. Nor does she want to participate. Would loke to see if her symptoms resolve after current medication changes.         **Per above, no further action needed by Navigator at this time.        Thank you,    Lawanda CARDOZA  Patient Navigator

## 2025-02-13 ENCOUNTER — VIRTUAL VISIT (OUTPATIENT)
Dept: BEHAVIORAL HEALTH | Facility: CLINIC | Age: 70
End: 2025-02-13
Payer: COMMERCIAL

## 2025-02-13 DIAGNOSIS — F41.1 GAD (GENERALIZED ANXIETY DISORDER): ICD-10-CM

## 2025-02-13 DIAGNOSIS — F33.1 MAJOR DEPRESSIVE DISORDER, RECURRENT EPISODE, MODERATE (H): Primary | ICD-10-CM

## 2025-02-13 NOTE — CONFIDENTIAL NOTE
Transition Clinic Progress Note    Patient Name:   Yessi Claire Date: February 13, 2025          Service Type: Individual           Start time 1630                End time 1715     Session Length:  TC Session Length: 45 (38-52 Minutes)    Session #:  2    Attendees: TC Attendees: Client alone    Service Modality:  Service Modality: Video Visit:      Provider verified identity through the following two step process.  Patient provided:  Patient is known previously to provider    Telemedicine Visit: The patient's condition can be safely assessed and treated via synchronous audio and visual telemedicine encounter.      Reason for Telemedicine Visit: Patient has requested telehealth visit    Originating Site (Patient Location): Patient's home    Distant Site (Provider Location): Provider Remote Setting- Home Office    Consent:  The patient/guardian has verbally consented to: the potential risks and benefits of telemedicine (video visit) versus in person care; bill my insurance or make self-payment for services provided; and responsibility for payment of non-covered services.     Patient would like the video invitation sent by:  Send to e-mail at: [email or web address]    Mode of Communication:  Video Conference via [employer]    Distant Location (Provider):  Off-site    As the provider I attest to compliance with applicable laws and regulations related to telemedicine.    Informed Consent and Assessment Methods    This provider and patient discussed HIPAA, and the limits of confidentiality; including mandated reporting, the possibility of collaborative discussions with patient's primary care provider and the multidisciplinary team in the MH clinic during consultation.  Discussed the no show policy, and the benefits and possible unintended consequences of therapy.  Patient indicated understanding Transition Clinic services are short term, solution focused, until a referral can be made and patient can bridge to long  "term therapy.  Patient verbally indicated understanding the informed consent.        DATA  Interactive Complexity: No  Crisis: No        Progress Since Last Session (Related to Symptoms / Goals / Homework):   Symptoms: Improving   Gaining insight, states that she is feeling better with her current medication.  \"I'm  beginning  to feel like my self again.\"  Homework: Partially completed      Episode of Care Goals: Satisfactory progress - ACTION (Actively working towards change); Intervened by reinforcing change plan / affirming steps taken     Current / Ongoing Stressors and Concerns:    Worries that her anxiety will worsen if she were to go off her medication.     Treatment Objective(s) Addressed in This Session:   identify 3 initial signs or symptoms of anxiety  Identify negative self-talk and behaviors: challenge core beliefs, myths, and actions       Intervention:   Solution Focused Brief Therapy:    Cognitive Behavioral Therapy (CBT) - Discussed changing thoughts is the path to changing behaviors and feelings. and Solution-Focused Brief Therapy (SFBT) - Change is perpetual, focus on the problematic excerptions. Reality is subjective and social constructed through conversation.    Assessments completed prior to visit:  The following assessments were completed by patient for this visit:  PHQ9:       4/4/2024     7:26 AM 10/1/2024     1:21 PM 10/1/2024     1:30 PM 10/29/2024    11:38 AM 11/24/2024    12:13 PM 12/30/2024     9:32 AM 1/26/2025    10:18 PM   PHQ-9 SCORE   PHQ-9 Total Score MyChart   5 (Mild depression)  3 (Minimal depression) 15 (Moderately severe depression) 8 (Mild depression)   PHQ-9 Total Score 0 6 5 4 3  15  8        Patient-reported     GAD7:       11/22/2023    10:40 AM 9/29/2024    12:58 PM 10/28/2024     2:12 PM 11/20/2024     1:28 PM 12/11/2024    10:22 PM 12/28/2024     2:23 PM 1/25/2025     4:21 PM   OSCAR-7 SCORE   Total Score 0 (minimal anxiety) 5 (mild anxiety) 7 (mild anxiety) 5 (mild " anxiety) 13 (moderate anxiety) 17 (severe anxiety) 6 (mild anxiety)   Total Score 0 5 7  5  13  17  6        Patient-reported     PROMIS 10-Global Health (all questions and answers displayed):       12/28/2024     2:25 PM 1/25/2025     4:24 PM   PROMIS 10   In general, would you say your health is: Very good Excellent   In general, would you say your quality of life is: Excellent Excellent   In general, how would you rate your physical health? Very good Excellent   In general, how would you rate your mental health, including your mood and your ability to think? Poor Good   In general, how would you rate your satisfaction with your social activities and relationships? Excellent Excellent   In general, please rate how well you carry out your usual social activities and roles Excellent Excellent   To what extent are you able to carry out your everyday physical activities such as walking, climbing stairs, carrying groceries, or moving a chair? Completely Completely   In the past 7 days, how often have you been bothered by emotional problems such as feeling anxious, depressed, or irritable? Often Often   In the past 7 days, how would you rate your fatigue on average? Mild None   In the past 7 days, how would you rate your pain on average, where 0 means no pain, and 10 means worst imaginable pain? 2 1   In general, would you say your health is: 4 5   In general, would you say your quality of life is: 5 5   In general, how would you rate your physical health? 4 5   In general, how would you rate your mental health, including your mood and your ability to think? 1 3   In general, how would you rate your satisfaction with your social activities and relationships? 5 5   In general, please rate how well you carry out your usual social activities and roles. (This includes activities at home, at work and in your community, and responsibilities as a parent, child, spouse, employee, friend, etc.) 5 5   To what extent are you able  to carry out your everyday physical activities such as walking, climbing stairs, carrying groceries, or moving a chair? 5 5   In the past 7 days, how often have you been bothered by emotional problems such as feeling anxious, depressed, or irritable? 4 4   In the past 7 days, how would you rate your fatigue on average? 2 1   In the past 7 days, how would you rate your pain on average, where 0 means no pain, and 10 means worst imaginable pain? 2 1   Global Mental Health Score 13  15    Global Physical Health Score 17  19    PROMIS TOTAL - SUBSCORES 30  34        Patient-reported     PROMIS Parent Proxy Scale V1.0 Global Health 7+2: No questionnaires on file.  [place] Suicide Severity Rating Scale (Lifetime/Recent)      10/25/2024     1:12 PM 10/25/2024     6:52 PM 12/20/2024     2:47 PM 12/20/2024     5:00 PM 12/20/2024     6:00 PM 12/20/2024     8:19 PM 1/27/2025    12:00 PM   [place] Suicide Severity Rating (Lifetime/Recent)   Q1 Wish to be Dead (Lifetime)  No    No No   Q2 Non-Specific Active Suicidal Thoughts (Lifetime)  No    No No   Q1 Wished to be Dead (Past Month) 0-->no 0-->no 0-->no 0-->no 0-->no 0-->no 0-->no   Q2 Suicidal Thoughts (Past Month) 0-->no 0-->no 0-->no 0-->no 0-->no 0-->no 0-->no   Q6 Suicide Behavior (Lifetime) 0-->no 0-->no 0-->no 0-->no 0-->no 0-->no 0-->no   Level of Risk per Screen no risks indicated no risks indicated no risks indicated no risks indicated no risks indicated no risks indicated no risks indicated   Actual Attempt (Lifetime)  N    N    Actual Attempt (Past 3 Months)  N        Has subject engaged in non-suicidal self-injurious behavior? (Lifetime)  N    N    Has subject engaged in non-suicidal self-injurious behavior? (Past 3 Months)  N        Interrupted Attempts (Lifetime)  N    N    Interrupted Attempts (Past 3 Months)  N        Aborted or Self-Interrupted Attempt (Lifetime)  N    N    Aborted or Self-Interrupted Attempt (Past 3 Months)  N        Preparatory Acts or  Behavior (Lifetime)  N    N    Preparatory Acts or Behavior (Past 3 Months)  N        Calculated C-SSRS Risk Score (Lifetime/Recent)  No Risk Indicated    No Risk Indicated          ASSESSMENT: Current Emotional / Mental Status (status of significant symptoms):   Risk status (Self / Other harm or suicidal ideation)   Patient denies current fears or concerns for personal safety.   Patient denies current or recent suicidal ideation or behaviors.   Patient denies current or recent homicidal ideation or behaviors.   Patient denies current or recent self injurious behavior or ideation.   Patient denies other safety concerns.   Patient reports there has been no change in risk factors since their last session.     Patient reports there has been no change in protective factors since their last session.     Recommended that patient call 911 or go to the local ED should there be a change in any of these risk factors     Appearance:   Appropriate    Eye Contact:   Good    Psychomotor Behavior: Normal    Attitude:   Cooperative    Orientation:   All   Speech    Rate / Production: Normal/ Responsive Normal     Volume:  Normal    Mood:    Normal   Affect:    Appropriate    Thought Content:  Clear    Thought Form:  Coherent  Logical    Insight:    Good      Medication Review:   No changes to current psychiatric medication(s)     Medication Compliance:   Yes     Changes in Health Issues:   None reported     Chemical Use Review:   Substance Use: Chemical use reviewed, no active concerns identified      Tobacco Use: No current tobacco use.      Diagnoses:   296.32 (F33.1) Major Depressive Disorder, Recurrent Episode, Moderate _ and With anxious distress  300.02 (F41.1) Generalized Anxiety Disorder      Collateral Reports Completed:    Collateral: [facility] electronic medical records reviewed.    PLAN: (Patient Tasks / Therapist Tasks / Other)  Continue stop thought processing techniques and strategies.   Use analogy of  юлияo   Establish treatment plan   Continue current medication            Regime.   Adhere to daily schedule  in            order to establish structure          and routine. (including yoga           and treadmill daily.)    Is this the patient's last discharge?: No    Procedure Code: Psychotherapy (with patient) - 45 [CPT 31334]    LOWELL Lennon  2/13/25

## 2025-03-06 ENCOUNTER — VIRTUAL VISIT (OUTPATIENT)
Dept: BEHAVIORAL HEALTH | Facility: CLINIC | Age: 70
End: 2025-03-06
Payer: COMMERCIAL

## 2025-03-06 DIAGNOSIS — F41.1 GAD (GENERALIZED ANXIETY DISORDER): ICD-10-CM

## 2025-03-06 DIAGNOSIS — F33.1 MAJOR DEPRESSIVE DISORDER, RECURRENT EPISODE, MODERATE (H): Primary | ICD-10-CM

## 2025-03-06 NOTE — PROGRESS NOTES
Transition Clinic Progress Note    Patient Name:   Yessi Claire Date: March 6, 2025          Service Type: Individual        VISIT TIME START: 1330      VISIT TIME END: 1400      Session Length:  30 min    Session #:  3    Attendees: TC Attendees: Client alone    Service Modality:  Service Modality: Video Visit:      Provider verified identity through the following two step process.  Patient provided:  Patient is known previously to provider    Telemedicine Visit: The patient's condition can be safely assessed and treated via synchronous audio and visual telemedicine encounter.      Reason for Telemedicine Visit: Patient has requested telehealth visit    Originating Site (Patient Location): Patient's home    Distant Site (Provider Location): Provider Remote Setting- Home Office    Consent:  The patient/guardian has verbally consented to: the potential risks and benefits of telemedicine (video visit) versus in person care; bill my insurance or make self-payment for services provided; and responsibility for payment of non-covered services.     Patient would like the video invitation sent by:  Send to e-mail at: [email or web address]    Mode of Communication:  Video Conference via [facility]    Distant Location (Provider):  On-site    As the provider I attest to compliance with applicable laws and regulations related to telemedicine.    Informed Consent and Assessment Methods    This provider and patient discussed HIPAA, and the limits of confidentiality; including mandated reporting, the possibility of collaborative discussions with patient's primary care provider and the multidisciplinary team in the MH clinic during consultation.  Discussed the no show policy, and the benefits and possible unintended consequences of therapy.  Patient indicated understanding Transition Clinic services are short term, solution focused, until a referral can be made and patient can bridge to long term therapy.  Patient verbally  "indicated understanding the informed consent.        DATA  Interactive Complexity: No  Crisis: No        Progress Since Last Session (Related to Symptoms / Goals / Homework):   Symptoms: Improving  \"I feel like my life is back the way it used to be\"   exercising, yoga, not stuffing   Feelings.  Homework: Completed in session    Good follow through      Episode of Care Goals: Satisfactory progress - PREPARATION (Decided to change - considering how); Intervened by negotiating a change plan and determining options / strategies for behavior change, identifying triggers, exploring social supports, and working towards setting a date to begin behavior change     Current / Ongoing Stressors and Concerns:  Patient reports that she is using the tools and techniques  learned in session as well as continuing medication management     Treatment Objective(s) Addressed in This Session:   dentify 3 initial signs or symptoms of anxiety  Identify negative self-talk and behaviors: challenge core beliefs, myths, and actions          Intervention:   Solution Focused Brief Therapy:    Cognitive Behavioral Therapy (CBT) - Discussed changing thoughts is the path to changing behaviors and feelings. and Solution-Focused Brief Therapy (SFBT) - Change is perpetual, focus on the problematic excerptions. Reality is subjective and social constructed through conversation.    Assessments completed prior to visit:  The following assessments were completed by patient for this visit:  PHQ9:       10/1/2024     1:21 PM 10/1/2024     1:30 PM 10/29/2024    11:38 AM 11/24/2024    12:13 PM 12/30/2024     9:32 AM 1/26/2025    10:18 PM 3/6/2025    12:37 AM   PHQ-9 SCORE   PHQ-9 Total Score MyChart  5 (Mild depression)  3 (Minimal depression) 15 (Moderately severe depression) 8 (Mild depression) 0   PHQ-9 Total Score 6 5 4 3  15  8  0        Patient-reported     GAD7:       11/22/2023    10:40 AM 9/29/2024    12:58 PM 10/28/2024     2:12 PM 11/20/2024     " 1:28 PM 12/11/2024    10:22 PM 12/28/2024     2:23 PM 1/25/2025     4:21 PM   OSCAR-7 SCORE   Total Score 0 (minimal anxiety) 5 (mild anxiety) 7 (mild anxiety) 5 (mild anxiety) 13 (moderate anxiety) 17 (severe anxiety) 6 (mild anxiety)   Total Score 0 5 7  5  13  17  6        Patient-reported     PROMIS 10-Global Health (all questions and answers displayed):       12/28/2024     2:25 PM 1/25/2025     4:24 PM   PROMIS 10   In general, would you say your health is: Very good Excellent   In general, would you say your quality of life is: Excellent Excellent   In general, how would you rate your physical health? Very good Excellent   In general, how would you rate your mental health, including your mood and your ability to think? Poor Good   In general, how would you rate your satisfaction with your social activities and relationships? Excellent Excellent   In general, please rate how well you carry out your usual social activities and roles Excellent Excellent   To what extent are you able to carry out your everyday physical activities such as walking, climbing stairs, carrying groceries, or moving a chair? Completely Completely   In the past 7 days, how often have you been bothered by emotional problems such as feeling anxious, depressed, or irritable? Often Often   In the past 7 days, how would you rate your fatigue on average? Mild None   In the past 7 days, how would you rate your pain on average, where 0 means no pain, and 10 means worst imaginable pain? 2 1   In general, would you say your health is: 4 5   In general, would you say your quality of life is: 5 5   In general, how would you rate your physical health? 4 5   In general, how would you rate your mental health, including your mood and your ability to think? 1 3   In general, how would you rate your satisfaction with your social activities and relationships? 5 5   In general, please rate how well you carry out your usual social activities and roles. (This  includes activities at home, at work and in your community, and responsibilities as a parent, child, spouse, employee, friend, etc.) 5 5   To what extent are you able to carry out your everyday physical activities such as walking, climbing stairs, carrying groceries, or moving a chair? 5 5   In the past 7 days, how often have you been bothered by emotional problems such as feeling anxious, depressed, or irritable? 4 4   In the past 7 days, how would you rate your fatigue on average? 2 1   In the past 7 days, how would you rate your pain on average, where 0 means no pain, and 10 means worst imaginable pain? 2 1   Global Mental Health Score 13  15    Global Physical Health Score 17  19    PROMIS TOTAL - SUBSCORES 30  34        Patient-reported     [place] Suicide Severity Rating Scale (Lifetime/Recent)      10/25/2024     1:12 PM 10/25/2024     6:52 PM 12/20/2024     2:47 PM 12/20/2024     5:00 PM 12/20/2024     6:00 PM 12/20/2024     8:19 PM 1/27/2025    12:00 PM   [place] Suicide Severity Rating (Lifetime/Recent)   Q1 Wish to be Dead (Lifetime)  No    No No   Q2 Non-Specific Active Suicidal Thoughts (Lifetime)  No    No No   Q1 Wished to be Dead (Past Month) 0-->no 0-->no 0-->no 0-->no 0-->no 0-->no 0-->no   Q2 Suicidal Thoughts (Past Month) 0-->no 0-->no 0-->no 0-->no 0-->no 0-->no 0-->no   Q6 Suicide Behavior (Lifetime) 0-->no 0-->no 0-->no 0-->no 0-->no 0-->no 0-->no   Level of Risk per Screen no risks indicated no risks indicated no risks indicated no risks indicated no risks indicated no risks indicated no risks indicated   Actual Attempt (Lifetime)  N    N    Actual Attempt (Past 3 Months)  N        Has subject engaged in non-suicidal self-injurious behavior? (Lifetime)  N    N    Has subject engaged in non-suicidal self-injurious behavior? (Past 3 Months)  N        Interrupted Attempts (Lifetime)  N    N    Interrupted Attempts (Past 3 Months)  N        Aborted or Self-Interrupted Attempt (Lifetime)  N    N     Aborted or Self-Interrupted Attempt (Past 3 Months)  N        Preparatory Acts or Behavior (Lifetime)  N    N    Preparatory Acts or Behavior (Past 3 Months)  N        Calculated C-SSRS Risk Score (Lifetime/Recent)  No Risk Indicated    No Risk Indicated          ASSESSMENT: Current Emotional / Mental Status (status of significant symptoms):   Risk status (Self / Other harm or suicidal ideation)   Patient denies current fears or concerns for personal safety.   Patient denies current or recent suicidal ideation or behaviors.   Patient denies current or recent homicidal ideation or behaviors.   Patient denies current or recent self injurious behavior or ideation.   Patient denies other safety concerns.   Patient reports there has been no change in risk factors since their last session.     Patient reports there has been no change in protective factors since their last session.     Recommended that patient call 911 or go to the local ED should there be a change in any of these risk factors     Appearance:   Appropriate    Eye Contact:   Good    Psychomotor Behavior: Normal    Attitude:   Cooperative    Orientation:   All   Speech    Rate / Production: Normal     Volume:  Normal    Mood:    Normal   Affect:    Appropriate    Thought Content:  Clear    Thought Form:  Coherent  Logical    Insight:    Good      Medication Review:   No changes to current psychiatric medication(s)     Medication Compliance:   Yes     Changes in Health Issues:   None reported     Chemical Use Review:   Substance Use: Chemical use reviewed, no active concerns identified      Tobacco Use: No current tobacco use.      Diagnoses:     296.32 (F33.1) Major Depressive Disorder, Recurrent Episode, Moderate _ and With anxious distress  300.02 (F41.1) Generalized Anxiety Disorder              Collateral Reports Completed:    Collateral: [facility] electronic medical records reviewed.    PLAN: (Patient Tasks / Therapist Tasks /  Other)  Continue stop thought processing techniques and strategies.   Use analogy of silo   Establish treatment plan   Continue current medication            Regime.   Adhere to daily schedule  in            order to establish structure          and routine. (including yoga      Is this the patient's last discharge?: No    Procedure Code: 30 min    LOWELL Lennon  3/6/25

## 2025-04-20 ENCOUNTER — HEALTH MAINTENANCE LETTER (OUTPATIENT)
Age: 70
End: 2025-04-20

## 2025-04-29 ENCOUNTER — TELEPHONE (OUTPATIENT)
Dept: BEHAVIORAL HEALTH | Facility: CLINIC | Age: 70
End: 2025-04-29
Payer: COMMERCIAL

## 2025-04-29 NOTE — TELEPHONE ENCOUNTER
As requested called patient to reschedule 6/5 appointment. Left VM asking for call back to reschedule.    Michelle Nguyen  Transition Clinic Coordinator  04/29/25 3:27 PM

## 2025-04-30 ENCOUNTER — TELEPHONE (OUTPATIENT)
Dept: BEHAVIORAL HEALTH | Facility: CLINIC | Age: 70
End: 2025-04-30
Payer: COMMERCIAL

## 2025-04-30 NOTE — TELEPHONE ENCOUNTER
Second attempt to reach pt and reschedule 06/05 appointment. Appointment cancelled and TC phone number provided for call back.    Lary Willard  04/29/2025  [phone]

## 2025-04-30 NOTE — TELEPHONE ENCOUNTER
Writer received a call stated that pt would like to reschedule her 6/5 appt with Bre.  Patient requested 6/11 on 1:30pm as her new appt time. Pt is scheduled with the new time.    Serene Brady  Transition Clinic Coordinator  04/30/25 12:54 PM

## 2025-05-25 DIAGNOSIS — E03.9 ACQUIRED HYPOTHYROIDISM: ICD-10-CM

## 2025-05-27 RX ORDER — LEVOTHYROXINE SODIUM 75 UG/1
75 TABLET ORAL DAILY
Qty: 90 TABLET | Refills: 1 | Status: SHIPPED | OUTPATIENT
Start: 2025-05-27

## 2025-06-11 ENCOUNTER — VIRTUAL VISIT (OUTPATIENT)
Dept: BEHAVIORAL HEALTH | Facility: CLINIC | Age: 70
End: 2025-06-11
Payer: COMMERCIAL

## 2025-06-11 DIAGNOSIS — F33.1 MAJOR DEPRESSIVE DISORDER, RECURRENT EPISODE, MODERATE (H): Primary | ICD-10-CM

## 2025-06-11 DIAGNOSIS — F41.1 GAD (GENERALIZED ANXIETY DISORDER): ICD-10-CM

## 2025-06-11 NOTE — PROGRESS NOTES
Answers submitted by the patient for this visit:  Patient Health Questionnaire (Submitted on 6/11/2025)  If you checked off any problems, how difficult have these problems made it for you to do your work, take care of things at home, or get along with other people?: Not difficult at all  PHQ9 TOTAL SCORE: 0      Transition Clinic Progress Note    Patient Name:   Yessi Claire Date: June 11, 2025          Service Type: Individual        VISIT TIME START: 1330      VISIT TIME END: 1348      Session Length:    18 min    Session #:  4    Attendees: TC Attendees: Client alone    Service Modality:  Service Modality: Video Visit:      Provider verified identity through the following two step process.  Patient provided:  Patient is known previously to provider    Telemedicine Visit: The patient's condition can be safely assessed and treated via synchronous audio and visual telemedicine encounter.      Reason for Telemedicine Visit: Patient has requested telehealth visit    Originating Site (Patient Location): Patient's home    Distant Site (Provider Location): Provider Remote Setting- Home Office    Consent:  The patient/guardian has verbally consented to: the potential risks and benefits of telemedicine (video visit) versus in person care; bill my insurance or make self-payment for services provided; and responsibility for payment of non-covered services.     Patient would like the video invitation sent by:  Send to e-mail at: [email or web address]    Mode of Communication:  Video Conference via [employer]    Distant Location (Provider):  On-site    As the provider I attest to compliance with applicable laws and regulations related to telemedicine.    Informed Consent and Assessment Methods    This provider and patient discussed HIPAA, and the limits of confidentiality; including mandated reporting, the possibility of collaborative discussions with patient's primary care provider and the multidisciplinary team in the   "clinic during consultation.  Discussed the no show policy, and the benefits and possible unintended consequences of therapy.  Patient indicated understanding Transition Clinic services are short term, solution focused, until a referral can be made and patient can bridge to long term therapy.  Patient verbally indicated understanding the informed consent.        DATA  Interactive Complexity: No  Crisis: No        Progress Since Last Session (Related to Symptoms / Goals / Homework):   Symptoms: Improving  patient stated that she had what she considers \"a blip\"  but believes she was able to work through a minor setback.  Homework: Achieved / completed to satisfaction      Episode of Care Goals: Satisfactory progress - ACTION (Actively working towards change); Intervened by reinforcing change plan / affirming steps taken     Current / Ongoing Stressors and Concerns:  Recently found out her  had to have a minor surgery which led to other types of tests.  that in turn increased her anxiety.  Was able to utilize tools and techniques she learned through therapy and verbalize her thoughts and feelings rather than stuffing the men.      Treatment Objective(s) Addressed in This Session:   identify 2 initial signs or symptoms of anxiety       Intervention:   Solution Focused Brief Therapy:    Cognitive Behavioral Therapy (CBT) - Discussed changing thoughts is the path to changing behaviors and feelings. and Solution-Focused Brief Therapy (SFBT) - Change is perpetual, focus on the problematic excerptions. Reality is subjective and social constructed through conversation.    Assessments completed prior to visit:  The following assessments were completed by patient for this visit:  PHQ9:       10/1/2024     1:30 PM 10/29/2024    11:38 AM 11/24/2024    12:13 PM 12/30/2024     9:32 AM 1/26/2025    10:18 PM 3/6/2025    12:37 AM 6/11/2025     1:13 PM   PHQ-9 SCORE   PHQ-9 Total Score MyChart 5 (Mild depression)  3 (Minimal " depression) 15 (Moderately severe depression) 8 (Mild depression) 0 0   PHQ-9 Total Score 5 4 3  15  8  0  0        Patient-reported     GAD7:       11/22/2023    10:40 AM 9/29/2024    12:58 PM 10/28/2024     2:12 PM 11/20/2024     1:28 PM 12/11/2024    10:22 PM 12/28/2024     2:23 PM 1/25/2025     4:21 PM   OSCAR-7 SCORE   Total Score 0 (minimal anxiety) 5 (mild anxiety) 7 (mild anxiety) 5 (mild anxiety) 13 (moderate anxiety) 17 (severe anxiety) 6 (mild anxiety)   Total Score 0 5 7  5  13  17  6        Patient-reported     CAGE-AID:       12/28/2024     2:25 PM   CAGE-AID Total Score   Total Score 0    Total Score MyChart 0 (A total score of 2 or greater is considered clinically significant)       Patient-reported     PROMIS 10-Global Health (all questions and answers displayed):       12/28/2024     2:25 PM 1/25/2025     4:24 PM 6/9/2025    10:34 PM   PROMIS 10   In general, would you say your health is: Very good Excellent Excellent   In general, would you say your quality of life is: Excellent Excellent Excellent   In general, how would you rate your physical health? Very good Excellent Excellent   In general, how would you rate your mental health, including your mood and your ability to think? Poor Good Very good   In general, how would you rate your satisfaction with your social activities and relationships? Excellent Excellent Excellent   In general, please rate how well you carry out your usual social activities and roles Excellent Excellent Excellent   To what extent are you able to carry out your everyday physical activities such as walking, climbing stairs, carrying groceries, or moving a chair? Completely Completely Completely   In the past 7 days, how often have you been bothered by emotional problems such as feeling anxious, depressed, or irritable? Often Often Never   In the past 7 days, how would you rate your fatigue on average? Mild None None   In the past 7 days, how would you rate your pain on  average, where 0 means no pain, and 10 means worst imaginable pain? 2 1 1   In general, would you say your health is: 4 5 5   In general, would you say your quality of life is: 5 5 5   In general, how would you rate your physical health? 4 5 5   In general, how would you rate your mental health, including your mood and your ability to think? 1 3 4   In general, how would you rate your satisfaction with your social activities and relationships? 5 5 5   In general, please rate how well you carry out your usual social activities and roles. (This includes activities at home, at work and in your community, and responsibilities as a parent, child, spouse, employee, friend, etc.) 5 5 5   To what extent are you able to carry out your everyday physical activities such as walking, climbing stairs, carrying groceries, or moving a chair? 5 5 5   In the past 7 days, how often have you been bothered by emotional problems such as feeling anxious, depressed, or irritable? 4 4 1   In the past 7 days, how would you rate your fatigue on average? 2 1 1   In the past 7 days, how would you rate your pain on average, where 0 means no pain, and 10 means worst imaginable pain? 2 1 1   Global Mental Health Score 13  15  19    Global Physical Health Score 17  19  19    PROMIS TOTAL - SUBSCORES 30  34  38        Patient-reported     [place] Suicide Severity Rating Scale (Lifetime/Recent)      10/25/2024     1:12 PM 10/25/2024     6:52 PM 12/20/2024     2:47 PM 12/20/2024     5:00 PM 12/20/2024     6:00 PM 12/20/2024     8:19 PM 1/27/2025    12:00 PM   [place] Suicide Severity Rating (Lifetime/Recent)   Q1 Wish to be Dead (Lifetime)  No    No No   Q2 Non-Specific Active Suicidal Thoughts (Lifetime)  No    No No   Q1 Wished to be Dead (Past Month) 0-->no 0-->no 0-->no 0-->no 0-->no 0-->no 0-->no   Q2 Suicidal Thoughts (Past Month) 0-->no 0-->no 0-->no 0-->no 0-->no 0-->no 0-->no   Q6 Suicide Behavior (Lifetime) 0-->no 0-->no 0-->no 0-->no  0-->no 0-->no 0-->no   Level of Risk per Screen no risks indicated no risks indicated no risks indicated no risks indicated no risks indicated no risks indicated no risks indicated   Actual Attempt (Lifetime)  N    N    Actual Attempt (Past 3 Months)  N        Has subject engaged in non-suicidal self-injurious behavior? (Lifetime)  N    N    Has subject engaged in non-suicidal self-injurious behavior? (Past 3 Months)  N        Interrupted Attempts (Lifetime)  N    N    Interrupted Attempts (Past 3 Months)  N        Aborted or Self-Interrupted Attempt (Lifetime)  N    N    Aborted or Self-Interrupted Attempt (Past 3 Months)  N        Preparatory Acts or Behavior (Lifetime)  N    N    Preparatory Acts or Behavior (Past 3 Months)  N        Calculated C-SSRS Risk Score (Lifetime/Recent)  No Risk Indicated    No Risk Indicated          ASSESSMENT: Current Emotional / Mental Status (status of significant symptoms):   Risk status (Self / Other harm or suicidal ideation)   Patient denies current fears or concerns for personal safety.   Patient denies current or recent suicidal ideation or behaviors.   Patient denies current or recent homicidal ideation or behaviors.   Patient denies current or recent self injurious behavior or ideation.   Patient denies other safety concerns.   Patient reports there has been no change in risk factors since their last session.     Patient reports there has been no change in protective factors since their last session.     Recommended that patient call 911 or go to the local ED should there be a change in any of these risk factors     Appearance:   Appropriate    Eye Contact:   Good    Psychomotor Behavior: Normal    Attitude:   Cooperative    Orientation:   All   Speech    Rate / Production: Normal/ Responsive Normal     Volume:  Normal    Mood:    Normal   Affect:    Appropriate    Thought Content:  Clear    Thought Form:  Coherent  Logical    Insight:    Good      Medication Review:   No  changes to current psychiatric medication(s)     Medication Compliance:   Yes     Changes in Health Issues:   None reported     Chemical Use Review:   Substance Use: Chemical use reviewed, no active concerns identified      Tobacco Use: No current tobacco use.      Diagnoses: 296.32 (F33.1) Major Depressive Disorder, Recurrent Episode, Moderate _ and With anxious distress  300.02 (F41.1) Generalized Anxiety Disorder                  Collateral Reports Completed:    Collateral: [facility] electronic medical records reviewed.    PLAN: (Patient Tasks / Therapist Tasks / Other)  Continue stop thought processing techniques and strategies.   Use analogy of silo   Establish treatment plan   Continue current medication            Regime.   Adhere to daily schedule  in            order to establish structure          and routine. (including yoga      Is this the patient's last discharge?: No    Procedure Code: Psychotherapy (with patient) - 30  [CPT 94443]    LOWELL Lennon  6/11/25

## 2025-07-10 ENCOUNTER — TELEPHONE (OUTPATIENT)
Dept: BEHAVIORAL HEALTH | Facility: CLINIC | Age: 70
End: 2025-07-10
Payer: COMMERCIAL

## 2025-07-10 NOTE — TELEPHONE ENCOUNTER
Pt called TC asking to scheduled return visit with Lesly. Return visit scheduled.     Michelle Nguyen  Transition Clinic Coordinator  07/10/25 11:40 AM

## 2025-07-10 NOTE — TELEPHONE ENCOUNTER
Pt called TC asking to reschedule appt to later time. Rescheduled.     Michelle Nguyen  Transition Clinic Coordinator  07/10/25 2:44 PM

## 2025-07-15 ASSESSMENT — ANXIETY QUESTIONNAIRES
2. NOT BEING ABLE TO STOP OR CONTROL WORRYING: SEVERAL DAYS
4. TROUBLE RELAXING: NOT AT ALL
3. WORRYING TOO MUCH ABOUT DIFFERENT THINGS: NOT AT ALL
GAD7 TOTAL SCORE: 4
GAD7 TOTAL SCORE: 4
6. BECOMING EASILY ANNOYED OR IRRITABLE: NOT AT ALL
GAD7 TOTAL SCORE: 4
5. BEING SO RESTLESS THAT IT IS HARD TO SIT STILL: NOT AT ALL
8. IF YOU CHECKED OFF ANY PROBLEMS, HOW DIFFICULT HAVE THESE MADE IT FOR YOU TO DO YOUR WORK, TAKE CARE OF THINGS AT HOME, OR GET ALONG WITH OTHER PEOPLE?: VERY DIFFICULT
IF YOU CHECKED OFF ANY PROBLEMS ON THIS QUESTIONNAIRE, HOW DIFFICULT HAVE THESE PROBLEMS MADE IT FOR YOU TO DO YOUR WORK, TAKE CARE OF THINGS AT HOME, OR GET ALONG WITH OTHER PEOPLE: VERY DIFFICULT
7. FEELING AFRAID AS IF SOMETHING AWFUL MIGHT HAPPEN: NOT AT ALL
1. FEELING NERVOUS, ANXIOUS, OR ON EDGE: NEARLY EVERY DAY
7. FEELING AFRAID AS IF SOMETHING AWFUL MIGHT HAPPEN: NOT AT ALL

## 2025-07-15 ASSESSMENT — PATIENT HEALTH QUESTIONNAIRE - PHQ9
SUM OF ALL RESPONSES TO PHQ QUESTIONS 1-9: 8
SUM OF ALL RESPONSES TO PHQ QUESTIONS 1-9: 8
10. IF YOU CHECKED OFF ANY PROBLEMS, HOW DIFFICULT HAVE THESE PROBLEMS MADE IT FOR YOU TO DO YOUR WORK, TAKE CARE OF THINGS AT HOME, OR GET ALONG WITH OTHER PEOPLE: VERY DIFFICULT

## 2025-07-16 ENCOUNTER — VIRTUAL VISIT (OUTPATIENT)
Dept: BEHAVIORAL HEALTH | Facility: CLINIC | Age: 70
End: 2025-07-16
Payer: COMMERCIAL

## 2025-07-16 DIAGNOSIS — F33.1 MAJOR DEPRESSIVE DISORDER, RECURRENT EPISODE, MODERATE (H): Primary | ICD-10-CM

## 2025-07-16 DIAGNOSIS — F41.1 GAD (GENERALIZED ANXIETY DISORDER): ICD-10-CM

## 2025-07-16 ASSESSMENT — ANXIETY QUESTIONNAIRES
7. FEELING AFRAID AS IF SOMETHING AWFUL MIGHT HAPPEN: NEARLY EVERY DAY
6. BECOMING EASILY ANNOYED OR IRRITABLE: NOT AT ALL
2. NOT BEING ABLE TO STOP OR CONTROL WORRYING: NEARLY EVERY DAY
5. BEING SO RESTLESS THAT IT IS HARD TO SIT STILL: SEVERAL DAYS
1. FEELING NERVOUS, ANXIOUS, OR ON EDGE: NEARLY EVERY DAY
GAD7 TOTAL SCORE: 14
3. WORRYING TOO MUCH ABOUT DIFFERENT THINGS: SEVERAL DAYS
4. TROUBLE RELAXING: NEARLY EVERY DAY
IF YOU CHECKED OFF ANY PROBLEMS ON THIS QUESTIONNAIRE, HOW DIFFICULT HAVE THESE PROBLEMS MADE IT FOR YOU TO DO YOUR WORK, TAKE CARE OF THINGS AT HOME, OR GET ALONG WITH OTHER PEOPLE: VERY DIFFICULT

## 2025-07-16 NOTE — PROGRESS NOTES
Answers submitted by the patient for this visit:  Patient Health Questionnaire (Submitted on 7/15/2025)  If you checked off any problems, how difficult have these problems made it for you to do your work, take care of things at home, or get along with other people?: Very difficult  PHQ9 TOTAL SCORE: 8  Patient Health Questionnaire (G7) (Submitted on 7/15/2025)  OSCAR 7 TOTAL SCORE: 4  scoring 14 on 7/16/25       Transition Clinic Progress Note    Patient Name:   Yessi Claire Date: July 16, 2025          Service Type: Individual        VISIT TIME START: 1430      VISIT TIME END: 1522      Session Length:  TC Session Length: 45 (38-52 Minutes)    Session #:  5    Attendees: TC Attendees: Client alone    Service Modality:  Service Modality: Video Visit:      Provider verified identity through the following two step process.  Patient provided:  Patient is known previously to provider    Telemedicine Visit: The patient's condition can be safely assessed and treated via synchronous audio and visual telemedicine encounter.      Reason for Telemedicine Visit: Patient has requested telehealth visit    Originating Site (Patient Location): Patient's home    Distant Site (Provider Location): Provider Remote Setting- Home Office    Consent:  The patient/guardian has verbally consented to: the potential risks and benefits of telemedicine (video visit) versus in person care; bill my insurance or make self-payment for services provided; and responsibility for payment of non-covered services.     Patient would like the video invitation sent by:  Send to e-mail at: [email or web address]    Mode of Communication:  Video Conference via [facility]    Distant Location (Provider):  Off-site    As the provider I attest to compliance with applicable laws and regulations related to telemedicine.    Informed Consent and Assessment Methods    This provider and patient discussed HIPAA, and the limits of confidentiality; including mandated  "reporting, the possibility of collaborative discussions with patient's primary care provider and the multidisciplinary team in the MH clinic during consultation.  Discussed the no show policy, and the benefits and possible unintended consequences of therapy.  Patient indicated understanding Transition Clinic services are short term, solution focused, until a referral can be made and patient can bridge to long term therapy.  Patient verbally indicated understanding the informed consent.        DATA  Interactive Complexity: No  Crisis: No        Progress Since Last Session (Related to Symptoms / Goals / Homework):   Symptoms: High anxiety. \"I feel so scared this isn't going to stop.\" Decreased motivation. I feel like I did when I went off of the gabapentin.\"  Worsening  increase crying episodes. Worse in morning.   Homework: Partially completed      Episode of Care Goals: Minimal progress - ACTION (Actively working towards change); Intervened by reinforcing change plan / affirming steps taken     Current / Ongoing Stressors and Concerns:   Slight anxiety a few weeks ago when   was undergoing  medical tests (beginning of June) then was feeling better.   Patient stated, that approximately 2 weeks ago her Effexor  was increased and then recently Risperidone increased.  Symptoms worsened  exhibiting increased crying, depressed mood, anxiety, worry, decreased appetite, negative thoughts.   Patient stating, \"if I have to live like this I do not want to live.\" Patient is not verbalizing any  immediate plans or intentions  of self-harm.     Treatment Objective(s) Addressed in This Session:   identify 2 fears / thoughts that contribute to feeling anxious and identify at least 2 triggers for anxiety, Decrease frequency and intensity of feeling down, depressed, hopeless  Identify negative self-talk and behaviors: challenge core beliefs, myths, and actions       Intervention:   Solution Focused Brief Therapy:    Cognitive " Behavioral Therapy (CBT) - Discussed changing thoughts is the path to changing behaviors and feelings. and Solution-Focused Brief Therapy (SFBT) - Change is perpetual, focus on the problematic excerptions. Reality is subjective and social constructed through conversation.    Discussed [facility] as an option. Family aware of pt's symptoms, supportive and involved. Would transport to [facility] if neccessary.     Assessments completed prior to visit:  The following assessments were completed by patient for this visit:  PHQ9:       10/29/2024    11:38 AM 11/24/2024    12:13 PM 12/30/2024     9:32 AM 1/26/2025    10:18 PM 3/6/2025    12:37 AM 6/11/2025     1:13 PM 7/15/2025     7:53 PM   PHQ-9 SCORE   PHQ-9 Total Score MyChart  3 (Minimal depression) 15 (Moderately severe depression) 8 (Mild depression) 0 0 8 (Mild depression)   PHQ-9 Total Score 4 3  15  8  0  0  8        Patient-reported     GAD7:       9/29/2024    12:58 PM 10/28/2024     2:12 PM 11/20/2024     1:28 PM 12/11/2024    10:22 PM 12/28/2024     2:23 PM 1/25/2025     4:21 PM 7/15/2025     7:58 PM   OSCAR-7 SCORE   Total Score 5 (mild anxiety) 7 (mild anxiety) 5 (mild anxiety) 13 (moderate anxiety) 17 (severe anxiety) 6 (mild anxiety) 4 (minimal anxiety)   Total Score 5 7  5  13  17  6  4        Patient-reported     PROMIS 10-Global Health (all questions and answers displayed):       12/28/2024     2:25 PM 1/25/2025     4:24 PM 6/9/2025    10:34 PM   PROMIS 10   In general, would you say your health is: Very good Excellent Excellent   In general, would you say your quality of life is: Excellent Excellent Excellent   In general, how would you rate your physical health? Very good Excellent Excellent   In general, how would you rate your mental health, including your mood and your ability to think? Poor Good Very good   In general, how would you rate your satisfaction with your social activities and relationships? Excellent Excellent Excellent   In general, please rate  how well you carry out your usual social activities and roles Excellent Excellent Excellent   To what extent are you able to carry out your everyday physical activities such as walking, climbing stairs, carrying groceries, or moving a chair? Completely Completely Completely   In the past 7 days, how often have you been bothered by emotional problems such as feeling anxious, depressed, or irritable? Often Often Never   In the past 7 days, how would you rate your fatigue on average? Mild None None   In the past 7 days, how would you rate your pain on average, where 0 means no pain, and 10 means worst imaginable pain? 2 1 1   In general, would you say your health is: 4 5 5   In general, would you say your quality of life is: 5 5 5   In general, how would you rate your physical health? 4 5 5   In general, how would you rate your mental health, including your mood and your ability to think? 1 3 4   In general, how would you rate your satisfaction with your social activities and relationships? 5 5 5   In general, please rate how well you carry out your usual social activities and roles. (This includes activities at home, at work and in your community, and responsibilities as a parent, child, spouse, employee, friend, etc.) 5 5 5   To what extent are you able to carry out your everyday physical activities such as walking, climbing stairs, carrying groceries, or moving a chair? 5 5 5   In the past 7 days, how often have you been bothered by emotional problems such as feeling anxious, depressed, or irritable? 4 4 1   In the past 7 days, how would you rate your fatigue on average? 2 1 1   In the past 7 days, how would you rate your pain on average, where 0 means no pain, and 10 means worst imaginable pain? 2 1 1   Global Mental Health Score 13  15  19    Global Physical Health Score 17  19  19    PROMIS TOTAL - SUBSCORES 30  34  38        Patient-reported     [place] Suicide Severity Rating Scale (Lifetime/Recent)       10/25/2024     1:12 PM 10/25/2024     6:52 PM 12/20/2024     2:47 PM 12/20/2024     5:00 PM 12/20/2024     6:00 PM 12/20/2024     8:19 PM 1/27/2025    12:00 PM   [place] Suicide Severity Rating (Lifetime/Recent)   Q1 Wish to be Dead (Lifetime)  No    No No   Q2 Non-Specific Active Suicidal Thoughts (Lifetime)  No    No No   Q1 Wished to be Dead (Past Month) 0-->no 0-->no 0-->no 0-->no 0-->no 0-->no 0-->no   Q2 Suicidal Thoughts (Past Month) 0-->no 0-->no 0-->no 0-->no 0-->no 0-->no 0-->no   Q6 Suicide Behavior (Lifetime) 0-->no 0-->no 0-->no 0-->no 0-->no 0-->no 0-->no   Level of Risk per Screen no risks indicated  no risks indicated  no risks indicated  no risks indicated  no risks indicated  no risks indicated  no risks indicated    Actual Attempt (Lifetime)  N    N    Actual Attempt (Past 3 Months)  N        Has subject engaged in non-suicidal self-injurious behavior? (Lifetime)  N    N    Has subject engaged in non-suicidal self-injurious behavior? (Past 3 Months)  N        Interrupted Attempts (Lifetime)  N    N    Interrupted Attempts (Past 3 Months)  N        Aborted or Self-Interrupted Attempt (Lifetime)  N    N    Aborted or Self-Interrupted Attempt (Past 3 Months)  N        Preparatory Acts or Behavior (Lifetime)  N    N    Preparatory Acts or Behavior (Past 3 Months)  N        Calculated C-SSRS Risk Score (Lifetime/Recent)  No Risk Indicated    No Risk Indicated        Data saved with a previous flowsheet row definition         ASSESSMENT: Current Emotional / Mental Status (status of significant symptoms):   Risk status (Self / Other harm or suicidal ideation)   Patient denies current fears or concerns for personal safety.   Patient reports the following current or recent suicidal ideation or behaviors:  passive thoughts no intentions.   Patient denies current or recent homicidal ideation or behaviors.   Patient denies current or recent self injurious behavior or ideation.   Patient denies other safety  concerns.   Patient reports there has been no change in risk factors since their last session.     Patient reports there has been no change in protective factors since their last session.     safety plan reviewed family supportive and involved     Appearance:   Appropriate    Eye Contact:   Good    Psychomotor Behavior: Normal  Restless    Attitude:   Cooperative    Orientation:   All   Speech    Rate / Production: Emotional Normal     Volume:  Normal    Mood:    Anxious  Sad    Affect:    Tearful Worrisome    Thought Content:  Clear    Thought Form:  Coherent  Logical    Insight:    Good      Medication Review:   Changes to psychiatric medications, see updated Medication List in EPIC.   Has nurse practitioner through [facility] prescribing current medications   Medication Compliance:   Yes     Changes in Health Issues:   None reported     Chemical Use Review:   Substance Use: Chemical use reviewed, no active concerns identified      Tobacco Use: No current tobacco use.      Diagnoses:     296.32 (F33.1) Major Depressive Disorder, Recurrent Episode, Moderate _ and With anxious distress  300.02 (F41.1) Generalized Anxiety Disorder                   Collateral Reports Completed:    Collateral: [facility] electronic medical records reviewed.    PLAN: (Patient Tasks / Therapist Tasks / Other)  Continue stop thought processing techniques and strategies.   Use analogy of silo   Establish treatment plan   Continue current medication            Regime.   Adhere to daily schedule  in            order to establish structure          and routine. (including yoga    6.    [facility] Unit was discussed as  and option if mood cannot be stabilized at home.  7.   Follow safety plan  Is this the patient's last discharge?: No    Procedure Code: Psychotherapy (with patient) - 45 [CPT 91532]    LOWELL Lennon Safety Plan       Creation Date: 10/25/24 Last Update Date: 12/20/24      Step 1: Warning  signs:     Warning Signs     Gabapentin withdrawal issues, increased anxiety, cry, worry, panic attacks     poor sleep and low appetite.      Step 2: Internal coping strategies - Things I can do to take my mind off my problems without contacting another person:     Strategies     deep breathing exercise, meditation, taking walks, hiking, gardening     mowing the lawn, decorations, reading, watching TV, coloring, stretching,      Step 3: People and social settings that provide distraction:     Name Contact Information     Daljit Corona [phone]        Places     outdoors      Step 4: People whom I can ask for help during a crisis:     Name Contact Information     Daljit Corona [phone]     My sisters        Step 5: Professionals or agencies I can contact during a crisis:     Clinician/Agency Name Phone Emergency Contact     [facility] crisis line [phone]          [facility] Emergency Department Emergency Department Address Emergency Department Phone     [facility]   [phone] 911      Suicide Prevention Lifeline Phone: Call or Text 784  Crisis Text Line: Text HOME to 527612     Step 6: Making the environment safer (plan for lethal means safety):   Joining a peer support group through [place], [place] to increase positive support system.  [facility] ([employer] [place] [employer]) improves the lives of children and adults with mental illnesses and their families by providing free classes on mental illnesses and support groups for adults with mental illnesses, parents and family members. For more information:  Phone: 931.584.3198  Toll free: 5-358-VMGA-HELPS  Website: [email or web address]      Optional: What is most important to me and worth living for?:   My  and family are important to me.     [place] Safety Plan. Rossy Copeland and Damion Sadler. Used with permission of the authors.            Provider Name/ Credentials:  Bre  [facility]                January 27, 2025

## 2025-08-12 DIAGNOSIS — E78.5 HYPERLIPIDEMIA LDL GOAL <130: ICD-10-CM

## 2025-08-13 RX ORDER — ROSUVASTATIN CALCIUM 10 MG/1
10 TABLET, COATED ORAL DAILY
Qty: 90 TABLET | Refills: 0 | Status: SHIPPED | OUTPATIENT
Start: 2025-08-13